# Patient Record
Sex: MALE | Race: WHITE | NOT HISPANIC OR LATINO | Employment: UNEMPLOYED | URBAN - NONMETROPOLITAN AREA
[De-identification: names, ages, dates, MRNs, and addresses within clinical notes are randomized per-mention and may not be internally consistent; named-entity substitution may affect disease eponyms.]

---

## 2017-03-20 ENCOUNTER — HOSPITAL ENCOUNTER (EMERGENCY)
Facility: HOSPITAL | Age: 32
Discharge: STILL ADMITTED OR ADMINISTRATIVE | End: 2017-03-21
Attending: EMERGENCY MEDICINE | Admitting: EMERGENCY MEDICINE

## 2017-03-20 VITALS
HEIGHT: 71 IN | WEIGHT: 148 LBS | TEMPERATURE: 98.1 F | DIASTOLIC BLOOD PRESSURE: 81 MMHG | HEART RATE: 100 BPM | RESPIRATION RATE: 18 BRPM | OXYGEN SATURATION: 99 % | BODY MASS INDEX: 20.72 KG/M2 | SYSTOLIC BLOOD PRESSURE: 126 MMHG

## 2017-03-20 DIAGNOSIS — F29 PSYCHOSIS, UNSPECIFIED PSYCHOSIS TYPE (HCC): Primary | ICD-10-CM

## 2017-03-20 DIAGNOSIS — R45.851 SUICIDAL IDEATIONS: ICD-10-CM

## 2017-03-20 RX ORDER — ALPRAZOLAM 0.25 MG/1
0.25 TABLET ORAL 2 TIMES DAILY PRN
Status: ON HOLD | COMMUNITY
End: 2017-03-21

## 2017-03-21 ENCOUNTER — HOSPITAL ENCOUNTER (INPATIENT)
Facility: HOSPITAL | Age: 32
LOS: 2 days | Discharge: HOME | End: 2017-03-23
Attending: PSYCHIATRY & NEUROLOGY | Admitting: PSYCHIATRY & NEUROLOGY

## 2017-03-21 PROBLEM — R45.851 DEPRESSION WITH SUICIDAL IDEATION: Status: ACTIVE | Noted: 2017-03-21

## 2017-03-21 PROBLEM — F25.0 SCHIZOAFFECTIVE DISORDER, BIPOLAR TYPE (HCC): Status: ACTIVE | Noted: 2017-03-21

## 2017-03-21 PROBLEM — F32.A DEPRESSION WITH SUICIDAL IDEATION: Status: ACTIVE | Noted: 2017-03-21

## 2017-03-21 LAB
ALBUMIN SERPL-MCNC: 4.2 G/DL (ref 3.5–5)
ALBUMIN/GLOB SERPL: 1.4 G/DL (ref 1.5–2.5)
ALP SERPL-CCNC: 70 U/L (ref 40–129)
ALT SERPL W P-5'-P-CCNC: 22 U/L (ref 10–44)
AMPHET+METHAMPHET UR QL: NEGATIVE
ANION GAP SERPL CALCULATED.3IONS-SCNC: 6.4 MMOL/L (ref 3.6–11.2)
AST SERPL-CCNC: 28 U/L (ref 10–34)
BARBITURATES UR QL SCN: NEGATIVE
BASOPHILS # BLD AUTO: 0.02 10*3/MM3 (ref 0–0.3)
BASOPHILS NFR BLD AUTO: 0.2 % (ref 0–2)
BENZODIAZ UR QL SCN: NEGATIVE
BILIRUB SERPL-MCNC: 0.4 MG/DL (ref 0.2–1.8)
BILIRUB UR QL STRIP: NEGATIVE
BUN BLD-MCNC: 11 MG/DL (ref 7–21)
BUN/CREAT SERPL: 14.5 (ref 7–25)
BUPRENORPHINE+NOR UR QL SCN: NEGATIVE
CALCIUM SPEC-SCNC: 9.1 MG/DL (ref 7.7–10)
CANNABINOIDS SERPL QL: POSITIVE
CHLORIDE SERPL-SCNC: 105 MMOL/L (ref 99–112)
CLARITY UR: CLEAR
CO2 SERPL-SCNC: 27.6 MMOL/L (ref 24.3–31.9)
COCAINE UR QL: NEGATIVE
COLOR UR: YELLOW
CREAT BLD-MCNC: 0.76 MG/DL (ref 0.43–1.29)
DEPRECATED RDW RBC AUTO: 41.9 FL (ref 37–54)
EOSINOPHIL # BLD AUTO: 0.06 10*3/MM3 (ref 0–0.7)
EOSINOPHIL NFR BLD AUTO: 0.6 % (ref 0–5)
ERYTHROCYTE [DISTWIDTH] IN BLOOD BY AUTOMATED COUNT: 12.8 % (ref 11.5–14.5)
ETHANOL BLD-MCNC: <10 MG/DL
ETHANOL UR QL: <0.01 %
GFR SERPL CREATININE-BSD FRML MDRD: 120 ML/MIN/1.73
GLOBULIN UR ELPH-MCNC: 3 GM/DL
GLUCOSE BLD-MCNC: 158 MG/DL (ref 70–110)
GLUCOSE UR STRIP-MCNC: NEGATIVE MG/DL
HCT VFR BLD AUTO: 44.8 % (ref 42–52)
HGB BLD-MCNC: 15 G/DL (ref 14–18)
HGB UR QL STRIP.AUTO: NEGATIVE
IMM GRANULOCYTES # BLD: 0.02 10*3/MM3 (ref 0–0.03)
IMM GRANULOCYTES NFR BLD: 0.2 % (ref 0–0.5)
KETONES UR QL STRIP: NEGATIVE
LEUKOCYTE ESTERASE UR QL STRIP.AUTO: NEGATIVE
LYMPHOCYTES # BLD AUTO: 2.02 10*3/MM3 (ref 1–3)
LYMPHOCYTES NFR BLD AUTO: 18.6 % (ref 21–51)
MCH RBC QN AUTO: 30.8 PG (ref 27–33)
MCHC RBC AUTO-ENTMCNC: 33.5 G/DL (ref 33–37)
MCV RBC AUTO: 92 FL (ref 80–94)
METHADONE UR QL SCN: NEGATIVE
MONOCYTES # BLD AUTO: 0.56 10*3/MM3 (ref 0.1–0.9)
MONOCYTES NFR BLD AUTO: 5.2 % (ref 0–10)
NEUTROPHILS # BLD AUTO: 8.19 10*3/MM3 (ref 1.4–6.5)
NEUTROPHILS NFR BLD AUTO: 75.2 % (ref 30–70)
NITRITE UR QL STRIP: NEGATIVE
OPIATES UR QL: NEGATIVE
OSMOLALITY SERPL CALC.SUM OF ELEC: 280.2 MOSM/KG (ref 273–305)
OXYCODONE UR QL SCN: NEGATIVE
PCP UR QL SCN: NEGATIVE
PH UR STRIP.AUTO: 6.5 [PH] (ref 5–8)
PLATELET # BLD AUTO: 223 10*3/MM3 (ref 130–400)
PMV BLD AUTO: 11.2 FL (ref 6–10)
POTASSIUM BLD-SCNC: 3.6 MMOL/L (ref 3.5–5.3)
PROPOXYPH UR QL: NEGATIVE
PROT SERPL-MCNC: 7.2 G/DL (ref 6–8)
PROT UR QL STRIP: NEGATIVE
RBC # BLD AUTO: 4.87 10*6/MM3 (ref 4.7–6.1)
SODIUM BLD-SCNC: 139 MMOL/L (ref 135–153)
SP GR UR STRIP: 1.01 (ref 1–1.03)
UROBILINOGEN UR QL STRIP: NORMAL
WBC NRBC COR # BLD: 10.87 10*3/MM3 (ref 4.5–12.5)

## 2017-03-21 PROCEDURE — 99223 1ST HOSP IP/OBS HIGH 75: CPT

## 2017-03-21 PROCEDURE — 25010000002 ZIPRASIDONE MESYLATE PER 10 MG: Performed by: PSYCHIATRY & NEUROLOGY

## 2017-03-21 RX ORDER — BENZONATATE 100 MG/1
100 CAPSULE ORAL 3 TIMES DAILY PRN
Status: DISCONTINUED | OUTPATIENT
Start: 2017-03-21 | End: 2017-03-23 | Stop reason: HOSPADM

## 2017-03-21 RX ORDER — ACETAMINOPHEN 325 MG/1
650 TABLET ORAL EVERY 4 HOURS PRN
Status: DISCONTINUED | OUTPATIENT
Start: 2017-03-21 | End: 2017-03-23 | Stop reason: HOSPADM

## 2017-03-21 RX ORDER — LORAZEPAM 2 MG/ML
2 INJECTION INTRAMUSCULAR EVERY 6 HOURS PRN
Status: DISCONTINUED | OUTPATIENT
Start: 2017-03-21 | End: 2017-03-23 | Stop reason: HOSPADM

## 2017-03-21 RX ORDER — ALUMINA, MAGNESIA, AND SIMETHICONE 2400; 2400; 240 MG/30ML; MG/30ML; MG/30ML
15 SUSPENSION ORAL EVERY 6 HOURS PRN
Status: DISCONTINUED | OUTPATIENT
Start: 2017-03-21 | End: 2017-03-23 | Stop reason: HOSPADM

## 2017-03-21 RX ORDER — ONDANSETRON 4 MG/1
4 TABLET, FILM COATED ORAL EVERY 6 HOURS PRN
Status: DISCONTINUED | OUTPATIENT
Start: 2017-03-21 | End: 2017-03-23 | Stop reason: HOSPADM

## 2017-03-21 RX ORDER — BENZTROPINE MESYLATE 1 MG/1
1 TABLET ORAL DAILY PRN
Status: DISCONTINUED | OUTPATIENT
Start: 2017-03-21 | End: 2017-03-23 | Stop reason: HOSPADM

## 2017-03-21 RX ORDER — BENZTROPINE MESYLATE 1 MG/ML
0.5 INJECTION INTRAMUSCULAR; INTRAVENOUS DAILY PRN
Status: DISCONTINUED | OUTPATIENT
Start: 2017-03-21 | End: 2017-03-23 | Stop reason: HOSPADM

## 2017-03-21 RX ORDER — WATER 1000 ML/1000ML
INJECTION, SOLUTION INTRAVENOUS
Status: COMPLETED
Start: 2017-03-21 | End: 2017-03-21

## 2017-03-21 RX ORDER — NICOTINE 21 MG/24HR
1 PATCH, TRANSDERMAL 24 HOURS TRANSDERMAL EVERY 24 HOURS
Status: DISCONTINUED | OUTPATIENT
Start: 2017-03-21 | End: 2017-03-23 | Stop reason: HOSPADM

## 2017-03-21 RX ORDER — PALIPERIDONE 6 MG/1
6 TABLET, EXTENDED RELEASE ORAL DAILY
Status: DISCONTINUED | OUTPATIENT
Start: 2017-03-21 | End: 2017-03-23 | Stop reason: HOSPADM

## 2017-03-21 RX ORDER — ECHINACEA PURPUREA EXTRACT 125 MG
2 TABLET ORAL AS NEEDED
Status: DISCONTINUED | OUTPATIENT
Start: 2017-03-21 | End: 2017-03-23 | Stop reason: HOSPADM

## 2017-03-21 RX ORDER — ZIPRASIDONE MESYLATE 20 MG/ML
10 INJECTION, POWDER, LYOPHILIZED, FOR SOLUTION INTRAMUSCULAR EVERY 6 HOURS PRN
Status: DISCONTINUED | OUTPATIENT
Start: 2017-03-21 | End: 2017-03-23 | Stop reason: HOSPADM

## 2017-03-21 RX ORDER — OLANZAPINE 5 MG/1
5 TABLET, ORALLY DISINTEGRATING ORAL EVERY 6 HOURS PRN
Status: DISCONTINUED | OUTPATIENT
Start: 2017-03-21 | End: 2017-03-23 | Stop reason: HOSPADM

## 2017-03-21 RX ORDER — TRAZODONE HYDROCHLORIDE 50 MG/1
50 TABLET ORAL NIGHTLY PRN
Status: DISCONTINUED | OUTPATIENT
Start: 2017-03-21 | End: 2017-03-23 | Stop reason: HOSPADM

## 2017-03-21 RX ORDER — HYDROXYZINE 50 MG/1
50 TABLET, FILM COATED ORAL EVERY 6 HOURS PRN
Status: DISCONTINUED | OUTPATIENT
Start: 2017-03-21 | End: 2017-03-23 | Stop reason: HOSPADM

## 2017-03-21 RX ADMIN — HYDROXYZINE HYDROCHLORIDE 50 MG: 50 TABLET ORAL at 15:22

## 2017-03-21 RX ADMIN — ZIPRASIDONE MESYLATE 10 MG: 20 INJECTION, POWDER, LYOPHILIZED, FOR SOLUTION INTRAMUSCULAR at 03:14

## 2017-03-21 RX ADMIN — PALIPERIDONE 6 MG: 6 TABLET, EXTENDED RELEASE ORAL at 13:25

## 2017-03-21 RX ADMIN — WATER 10 ML: 1 INJECTION INTRAMUSCULAR; INTRAVENOUS; SUBCUTANEOUS at 03:11

## 2017-03-21 NOTE — PLAN OF CARE
Problem: BH Patient Care Overview (Adult)  Goal: Plan of Care Review  Outcome: Ongoing (interventions implemented as appropriate)    03/21/17 1445   Coping/Psychosocial Response Interventions   Plan Of Care Reviewed With patient   Coping/Psychosocial   Patient Agreement with Plan of Care agrees   Patient Care Overview   Progress improving   Outcome Evaluation   Outcome Summary/Follow up Plan Patient calm at present, denies any thoughts to harm self, reports anxiety at 7 and depression at 10/10. Patient reports good sleep of 8 hours last night with no complaints. Patient does reports auditory and visual hallucinations and is currently a/o x2. Patient reports medications seem to be helping. Will continue to monitor.       Goal: Interdisciplinary Rounds/Family Conference  Outcome: Ongoing (interventions implemented as appropriate)  Goal: Individualization and Mutuality  Outcome: Ongoing (interventions implemented as appropriate)  Goal: Discharge Needs Assessment  Outcome: Ongoing (interventions implemented as appropriate)    Problem:  Overarching Goals  Goal: Adheres to Safety Considerations for Self and Others  Outcome: Ongoing (interventions implemented as appropriate)  Goal: Optimized Coping Skills in Response to Life Stressors  Outcome: Ongoing (interventions implemented as appropriate)  Goal: Develops/Participates in Therapeutic Dania to Support Successful Transition  Outcome: Ongoing (interventions implemented as appropriate)

## 2017-03-21 NOTE — NURSING NOTE
Patient to intake per ED staff.  Pockets emptied and through search complete.  Patient searched by intake nurse and witnessed (Kaylee, ) per ED Policy 100.  Belongings logged on Personal Belongings Inventory Sheet. Patient placed in hospital attire.  Room swept for any potential safety hazards, room cleared, and patient placed in treatment room.  Patient ready for evaluation.

## 2017-03-21 NOTE — ED NOTES
Walked pt back to intake area at this time and report given to intake RN. Security present     Meryl Su RN  03/20/17 2792
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
## 2017-03-21 NOTE — NURSING NOTE
Keila, Registration notified of bed assignment (21 B) with new admission ready to be completed at this time.  Verbalizes understanding.

## 2017-03-21 NOTE — NURSING NOTE
PATEL Traylor notified of new orders (Geodon 10 mg IM every 6 hours x 1 day for agitation and Private Room) per Dr. Lopez.  Verbalizes understanding with no questions at this time.

## 2017-03-21 NOTE — NURSING NOTE
Kymberly RN notified at this time of intake assessment, laboratory results, bed assignment (21 B), and new order to admit, routine orders, and SP3 precautions per Dr. Lopez.  Verbalizes understanding with no questions at this time.
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
## 2017-03-21 NOTE — NURSING NOTE
Dr. Lopez notified of intake assessment, laboratory results, and verbalizes understanding with new orders noted at this time (admit, routine orders, SP3 Precautions).  RBTO x2.

## 2017-03-21 NOTE — NURSING NOTE
PATEL Briceño Lead notified of new order per Dr. Lopez to admit, routine orders, SP3 Precautions, and insurance (Medicare A & B).  Verbalizes understanding and bed assignment (21 B).

## 2017-03-21 NOTE — NURSING NOTE
Dr. Lopez notified of inappropriate patient behavior, agitation, and verbalizes understanding with new order (Geodon 10 mg IM every 6 hours PRN x 1 day for agitation and Private Room) noted at this time. RBTO x2.

## 2017-03-21 NOTE — NURSING NOTE
Krysta Pharmacy Tech (ext. [phone]) notified of new order to admit per Dr. Lopez and bed assignment (21 B).  Verbalizes understanding.

## 2017-03-22 PROCEDURE — 99232 SBSQ HOSP IP/OBS MODERATE 35: CPT

## 2017-03-22 RX ADMIN — PALIPERIDONE 6 MG: 6 TABLET, EXTENDED RELEASE ORAL at 08:22

## 2017-03-22 RX ADMIN — OLANZAPINE 5 MG: 5 TABLET, ORALLY DISINTEGRATING ORAL at 08:22

## 2017-03-22 RX ADMIN — NICOTINE 1 PATCH: 21 PATCH TRANSDERMAL at 08:22

## 2017-03-22 RX ADMIN — TRAZODONE HYDROCHLORIDE 50 MG: 50 TABLET ORAL at 20:40

## 2017-03-22 NOTE — PLAN OF CARE
Problem: BH Patient Care Overview (Adult)  Goal: Plan of Care Review  Outcome: Ongoing (interventions implemented as appropriate)  NO PROBLEMS THIS SHIFT. INVEGA 6MG Q DAY, PHARMACY CONSULT.    03/22/17 1520   Coping/Psychosocial Response Interventions   Plan Of Care Reviewed With patient   Coping/Psychosocial   Patient Agreement with Plan of Care agrees   Patient Care Overview   Progress no change       Goal: Interdisciplinary Rounds/Family Conference  Outcome: Ongoing (interventions implemented as appropriate)  Goal: Individualization and Mutuality  Outcome: Ongoing (interventions implemented as appropriate)  Goal: Discharge Needs Assessment  Outcome: Ongoing (interventions implemented as appropriate)    Problem: BH Overarching Goals  Goal: Adheres to Safety Considerations for Self and Others  Outcome: Ongoing (interventions implemented as appropriate)  Goal: Optimized Coping Skills in Response to Life Stressors  Outcome: Ongoing (interventions implemented as appropriate)  Goal: Develops/Participates in Therapeutic [place] to Support Successful Transition  Outcome: Ongoing (interventions implemented as appropriate)

## 2017-03-22 NOTE — PROGRESS NOTES
D: The therapist was informed the patient had an $8.00 copay on his Invega injection, which was to be given tomorrow. The therapist spoke with the patient at his bedside, assessing his needs and discussing his ability to meet the copayment. The patient reported he did not have any money. He consented for the therapist to contact his father, Aric, and to discuss the possibility of his meeting the copayment on his behalf. However, the patient felt almost certain his father would not be able to meet the copayment.     A: The patient seemed calm and cooperative at this time. He seemed oriented, and his thought processes seemed organized.     The therapist attempted to contact the patient's father, Aric, at telephone number [phone].  He was not reachable at this time, and a message was left requesting callback explaining the purpose of the phone call.    P: The patient will possibly be discharged tomorrow. The therapist will assist the patient with meeting his copayment through the [facility] program, if his father is not able to assist. His father will also provide transportation tomorrow.

## 2017-03-22 NOTE — PLAN OF CARE
"Problem:  Patient Care Overview (Adult)  Goal: Plan of Care Review  Outcome: Ongoing (interventions implemented as appropriate)    03/22/17 0455   Coping/Psychosocial Response Interventions   Plan Of Care Reviewed With patient   Coping/Psychosocial   Patient Agreement with Plan of Care agrees   Patient Care Overview   Progress no change   Outcome Evaluation   Outcome Summary/Follow up Plan PT RATED ANXIETY & DEPRESSION BOTH 10, DENIED SI/HI, REPORTED A/V HALLUCINATIONS \"LOT OF STUFF\". NO OUT BURST THIS SHIFT, IN ROOM MOST OF SHIFT.         Problem:  Overarching Goals  Goal: Adheres to Safety Considerations for Self and Others  Outcome: Ongoing (interventions implemented as appropriate)  Goal: Optimized Coping Skills in Response to Life Stressors  Outcome: Ongoing (interventions implemented as appropriate)  Goal: Develops/Participates in Therapeutic [place] to Support Successful Transition  Outcome: Ongoing (interventions implemented as appropriate)      "

## 2017-03-23 VITALS
DIASTOLIC BLOOD PRESSURE: 84 MMHG | BODY MASS INDEX: 21.7 KG/M2 | TEMPERATURE: 97.8 F | SYSTOLIC BLOOD PRESSURE: 124 MMHG | OXYGEN SATURATION: 98 % | WEIGHT: 155 LBS | RESPIRATION RATE: 18 BRPM | HEIGHT: 71 IN | HEART RATE: 110 BPM

## 2017-03-23 PROCEDURE — 99238 HOSP IP/OBS DSCHRG MGMT 30/<: CPT

## 2017-03-23 RX ORDER — TRAZODONE HYDROCHLORIDE 50 MG/1
50 TABLET ORAL NIGHTLY PRN
Qty: 30 TABLET | Refills: 0 | Status: SHIPPED | OUTPATIENT
Start: 2017-03-23 | End: 2017-04-28 | Stop reason: SDUPTHER

## 2017-03-23 RX ADMIN — PALIPERIDONE 6 MG: 6 TABLET, EXTENDED RELEASE ORAL at 08:14

## 2017-03-23 RX ADMIN — NICOTINE 1 PATCH: 21 PATCH TRANSDERMAL at 08:14

## 2017-03-23 NOTE — PLAN OF CARE
Problem: BH Patient Care Overview (Adult)  Goal: Plan of Care Review  Outcome: Ongoing (interventions implemented as appropriate)    03/22/17 2019   Coping/Psychosocial Response Interventions   Plan Of Care Reviewed With patient   Coping/Psychosocial   Patient Agreement with Plan of Care agrees   Patient Care Overview   Progress no change   Outcome Evaluation   Outcome Summary/Follow up Plan Pt calm and cooperative. Pt denies any anxiety, depression, SI, HI, and hallucinations.          Problem:  Overarching Goals  Goal: Adheres to Safety Considerations for Self and Others  Outcome: Ongoing (interventions implemented as appropriate)  Goal: Optimized Coping Skills in Response to Life Stressors  Outcome: Ongoing (interventions implemented as appropriate)  Goal: Develops/Participates in Therapeutic [place] to Support Successful Transition  Outcome: Ongoing (interventions implemented as appropriate)
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
## 2017-03-23 NOTE — PROGRESS NOTES
Contacted the patient's father, Aric, who was reachable at [phone]. Informed Aric of the likelihood the patient would be discharged today. Informed Aric of the Invega injection ordered today and the $8 copayment. Aric agreed to pay the $8 for the Invega injection today. He agreed to provide transportation. He was also agreeable to the Invega injection scheduled a month from today. He was aware there may be another copayment at that time.

## 2017-03-23 NOTE — PLAN OF CARE
Problem:  Patient Care Overview (Adult)  Goal: Plan of Care Review  Outcome: Outcome(s) achieved Date Met:  03/23/17 03/23/17 1105   Coping/Psychosocial Response Interventions   Plan Of Care Reviewed With patient   Coping/Psychosocial   Patient Agreement with Plan of Care agrees   Patient Care Overview   Progress improving   Outcome Evaluation   Outcome Summary/Follow up Plan Ready for discharge.       Goal: Interdisciplinary Rounds/Family Conference  Outcome: Outcome(s) achieved Date Met:  03/23/17  Goal: Individualization and Mutuality  Outcome: Outcome(s) achieved Date Met:  03/23/17  Goal: Discharge Needs Assessment  Outcome: Outcome(s) achieved Date Met:  03/23/17    Problem:  Overarching Goals  Goal: Adheres to Safety Considerations for Self and Others  Outcome: Outcome(s) achieved Date Met:  03/23/17  Goal: Optimized Coping Skills in Response to Life Stressors  Outcome: Outcome(s) achieved Date Met:  03/23/17  Goal: Develops/Participates in Therapeutic [place] to Support Successful Transition  Outcome: Outcome(s) achieved Date Met:  03/23/17    Problem: Alteration in Thoughts and Perception  Goal: Treatment Goal: Gain control of psychotic behaviors/thinking, reduce/eliminate presenting symptoms and demonstrate improved reality functioning upon discharge  Outcome: Outcome(s) achieved Date Met:  03/23/17  Goal: Verbalize thoughts and feelings associated with:  Outcome: Outcome(s) achieved Date Met:  03/23/17    Problem: Anxiety (Adult)  Goal: Identify Related Risk Factors and Signs and Symptoms  Outcome: Outcome(s) achieved Date Met:  03/23/17    Problem: Depression  Goal: Treatment Goal: Demonstrate behavioral control of depressive symptoms, verbalize feelings of improved mood/affect, and adopt new coping skills prior to discharge  Outcome: Outcome(s) achieved Date Met:  03/23/17  Goal: Verbalize thoughts and feelings associated with:  Outcome: Outcome(s) achieved Date Met:  03/23/17    Problem: Risk  for Self Injury/Neglect  Goal: Verbalize thoughts and feelings associated with:  Outcome: Outcome(s) achieved Date Met:  03/23/17

## 2017-03-23 NOTE — PROGRESS NOTES
Therapist met with patient in day area to discuss disposition planning and progress with treatment.  Patient denied concerns.  He reported feeling less depressed.  Patient denied thoughts to harm self or others.  He denied hallucinations.  Patient appeared oriented x4, displayed linear thought process.  He appeared calm and cooperative.  Patient appeared stable to discharge today.  He was agreeable to continue Invega injections in the [facility].  Patient agreeable and has aftercare scheduled with [facility] on March 30th, 2017 at 9:15 AM.  Patient educated and agreed to seek nearest ER if his symptoms become acute again.

## 2017-03-23 NOTE — SIGNIFICANT NOTE
03/23/17 1033   [place] Suicide Severity Rating Scale (Discharge)   1. Wish to be Dead No   2. Suicidal Thoughts No   6. Suicide Behavior Question No

## 2017-05-05 ENCOUNTER — HOSPITAL ENCOUNTER (INPATIENT)
Facility: HOSPITAL | Age: 32
LOS: 4 days | Discharge: HOME | End: 2017-05-09
Attending: PSYCHIATRY & NEUROLOGY | Admitting: PSYCHIATRY & NEUROLOGY

## 2017-05-05 ENCOUNTER — HOSPITAL ENCOUNTER (EMERGENCY)
Facility: HOSPITAL | Age: 32
Discharge: STILL ADMITTED OR ADMINISTRATIVE | End: 2017-05-05
Attending: EMERGENCY MEDICINE | Admitting: EMERGENCY MEDICINE

## 2017-05-05 VITALS
BODY MASS INDEX: 22.73 KG/M2 | HEART RATE: 107 BPM | HEIGHT: 68 IN | WEIGHT: 150 LBS | TEMPERATURE: 98.4 F | DIASTOLIC BLOOD PRESSURE: 77 MMHG | OXYGEN SATURATION: 96 % | RESPIRATION RATE: 18 BRPM | SYSTOLIC BLOOD PRESSURE: 126 MMHG

## 2017-05-05 DIAGNOSIS — F25.0 SCHIZOAFFECTIVE DISORDER, BIPOLAR TYPE (HCC): Primary | ICD-10-CM

## 2017-05-05 PROBLEM — F32.9 MDD (MAJOR DEPRESSIVE DISORDER): Status: ACTIVE | Noted: 2017-05-05

## 2017-05-05 LAB
6-ACETYL MORPHINE: NEGATIVE
ALBUMIN SERPL-MCNC: 4.6 G/DL (ref 3.5–5)
ALBUMIN/GLOB SERPL: 1.6 G/DL (ref 1.5–2.5)
ALP SERPL-CCNC: 66 U/L (ref 40–129)
ALT SERPL W P-5'-P-CCNC: 12 U/L (ref 10–44)
AMPHET+METHAMPHET UR QL: POSITIVE
ANION GAP SERPL CALCULATED.3IONS-SCNC: 4.2 MMOL/L (ref 3.6–11.2)
AST SERPL-CCNC: 19 U/L (ref 10–34)
BARBITURATES UR QL SCN: NEGATIVE
BASOPHILS # BLD AUTO: 0.02 10*3/MM3 (ref 0–0.3)
BASOPHILS NFR BLD AUTO: 0.2 % (ref 0–2)
BENZODIAZ UR QL SCN: NEGATIVE
BILIRUB SERPL-MCNC: 0.6 MG/DL (ref 0.2–1.8)
BILIRUB UR QL STRIP: NEGATIVE
BUN BLD-MCNC: 9 MG/DL (ref 7–21)
BUN/CREAT SERPL: 12.2 (ref 7–25)
BUPRENORPHINE SERPL-MCNC: NEGATIVE NG/ML
CALCIUM SPEC-SCNC: 9.6 MG/DL (ref 7.7–10)
CANNABINOIDS SERPL QL: POSITIVE
CHLORIDE SERPL-SCNC: 106 MMOL/L (ref 99–112)
CLARITY UR: CLEAR
CO2 SERPL-SCNC: 31.8 MMOL/L (ref 24.3–31.9)
COCAINE UR QL: NEGATIVE
COLOR UR: ABNORMAL
CREAT BLD-MCNC: 0.74 MG/DL (ref 0.43–1.29)
DEPRECATED RDW RBC AUTO: 42.3 FL (ref 37–54)
EOSINOPHIL # BLD AUTO: 0.08 10*3/MM3 (ref 0–0.7)
EOSINOPHIL NFR BLD AUTO: 0.8 % (ref 0–5)
ERYTHROCYTE [DISTWIDTH] IN BLOOD BY AUTOMATED COUNT: 12.9 % (ref 11.5–14.5)
ETHANOL BLD-MCNC: <10 MG/DL
ETHANOL UR QL: <0.01 %
GFR SERPL CREATININE-BSD FRML MDRD: 124 ML/MIN/1.73
GLOBULIN UR ELPH-MCNC: 2.8 GM/DL
GLUCOSE BLD-MCNC: 84 MG/DL (ref 70–110)
GLUCOSE UR STRIP-MCNC: NEGATIVE MG/DL
HCT VFR BLD AUTO: 44.9 % (ref 42–52)
HGB BLD-MCNC: 15.2 G/DL (ref 14–18)
HGB UR QL STRIP.AUTO: NEGATIVE
IMM GRANULOCYTES # BLD: 0.03 10*3/MM3 (ref 0–0.03)
IMM GRANULOCYTES NFR BLD: 0.3 % (ref 0–0.5)
KETONES UR QL STRIP: ABNORMAL
LEUKOCYTE ESTERASE UR QL STRIP.AUTO: NEGATIVE
LYMPHOCYTES # BLD AUTO: 1.62 10*3/MM3 (ref 1–3)
LYMPHOCYTES NFR BLD AUTO: 16.9 % (ref 21–51)
MCH RBC QN AUTO: 30.6 PG (ref 27–33)
MCHC RBC AUTO-ENTMCNC: 33.9 G/DL (ref 33–37)
MCV RBC AUTO: 90.3 FL (ref 80–94)
MDMA UR QL SCN: NEGATIVE
METHADONE UR QL SCN: NEGATIVE
MONOCYTES # BLD AUTO: 0.71 10*3/MM3 (ref 0.1–0.9)
MONOCYTES NFR BLD AUTO: 7.4 % (ref 0–10)
NEUTROPHILS # BLD AUTO: 7.1 10*3/MM3 (ref 1.4–6.5)
NEUTROPHILS NFR BLD AUTO: 74.4 % (ref 30–70)
NITRITE UR QL STRIP: NEGATIVE
OPIATES UR QL: NEGATIVE
OSMOLALITY SERPL CALC.SUM OF ELEC: 281 MOSM/KG (ref 273–305)
OXYCODONE UR QL SCN: NEGATIVE
PCP UR QL SCN: NEGATIVE
PH UR STRIP.AUTO: 6 [PH] (ref 5–8)
PLATELET # BLD AUTO: 190 10*3/MM3 (ref 130–400)
PMV BLD AUTO: 11.5 FL (ref 6–10)
POTASSIUM BLD-SCNC: 3.9 MMOL/L (ref 3.5–5.3)
PROT SERPL-MCNC: 7.4 G/DL (ref 6–8)
PROT UR QL STRIP: NEGATIVE
RBC # BLD AUTO: 4.97 10*6/MM3 (ref 4.7–6.1)
SODIUM BLD-SCNC: 142 MMOL/L (ref 135–153)
SP GR UR STRIP: 1.02 (ref 1–1.03)
UROBILINOGEN UR QL STRIP: ABNORMAL
WBC NRBC COR # BLD: 9.56 10*3/MM3 (ref 4.5–12.5)

## 2017-05-05 RX ORDER — UREA 10 %
2 LOTION (ML) TOPICAL DAILY
Status: DISCONTINUED | OUTPATIENT
Start: 2017-05-05 | End: 2017-05-09 | Stop reason: HOSPADM

## 2017-05-05 RX ORDER — ALUMINA, MAGNESIA, AND SIMETHICONE 2400; 2400; 240 MG/30ML; MG/30ML; MG/30ML
15 SUSPENSION ORAL EVERY 6 HOURS PRN
Status: DISCONTINUED | OUTPATIENT
Start: 2017-05-05 | End: 2017-05-09 | Stop reason: HOSPADM

## 2017-05-05 RX ORDER — ONDANSETRON 4 MG/1
4 TABLET, FILM COATED ORAL EVERY 6 HOURS PRN
Status: DISCONTINUED | OUTPATIENT
Start: 2017-05-05 | End: 2017-05-09 | Stop reason: HOSPADM

## 2017-05-05 RX ORDER — LORAZEPAM 2 MG/ML
2 INJECTION INTRAMUSCULAR EVERY 6 HOURS PRN
Status: DISCONTINUED | OUTPATIENT
Start: 2017-05-05 | End: 2017-05-09 | Stop reason: HOSPADM

## 2017-05-05 RX ORDER — FAMOTIDINE 20 MG/1
20 TABLET, FILM COATED ORAL 2 TIMES DAILY PRN
Status: DISCONTINUED | OUTPATIENT
Start: 2017-05-05 | End: 2017-05-09 | Stop reason: HOSPADM

## 2017-05-05 RX ORDER — BENZTROPINE MESYLATE 1 MG/ML
0.5 INJECTION INTRAMUSCULAR; INTRAVENOUS DAILY PRN
Status: DISCONTINUED | OUTPATIENT
Start: 2017-05-05 | End: 2017-05-09 | Stop reason: HOSPADM

## 2017-05-05 RX ORDER — MULTIVITAMIN
1 TABLET ORAL DAILY
Status: DISCONTINUED | OUTPATIENT
Start: 2017-05-05 | End: 2017-05-09 | Stop reason: HOSPADM

## 2017-05-05 RX ORDER — ECHINACEA PURPUREA EXTRACT 125 MG
2 TABLET ORAL AS NEEDED
Status: DISCONTINUED | OUTPATIENT
Start: 2017-05-05 | End: 2017-05-09 | Stop reason: HOSPADM

## 2017-05-05 RX ORDER — NICOTINE 21 MG/24HR
1 PATCH, TRANSDERMAL 24 HOURS TRANSDERMAL DAILY
Status: DISCONTINUED | OUTPATIENT
Start: 2017-05-05 | End: 2017-05-09 | Stop reason: HOSPADM

## 2017-05-05 RX ORDER — BENZTROPINE MESYLATE 1 MG/1
1 TABLET ORAL DAILY PRN
Status: DISCONTINUED | OUTPATIENT
Start: 2017-05-05 | End: 2017-05-09 | Stop reason: HOSPADM

## 2017-05-05 RX ORDER — HYDROXYZINE 50 MG/1
50 TABLET, FILM COATED ORAL EVERY 4 HOURS PRN
Status: DISCONTINUED | OUTPATIENT
Start: 2017-05-05 | End: 2017-05-09 | Stop reason: HOSPADM

## 2017-05-05 RX ORDER — DIPHENHYDRAMINE HYDROCHLORIDE 50 MG/ML
50 INJECTION INTRAMUSCULAR; INTRAVENOUS EVERY 6 HOURS PRN
Status: DISCONTINUED | OUTPATIENT
Start: 2017-05-05 | End: 2017-05-09 | Stop reason: HOSPADM

## 2017-05-05 RX ORDER — HALOPERIDOL 5 MG/ML
5 INJECTION INTRAMUSCULAR EVERY 6 HOURS PRN
Status: DISCONTINUED | OUTPATIENT
Start: 2017-05-05 | End: 2017-05-09 | Stop reason: HOSPADM

## 2017-05-05 RX ORDER — ACETAMINOPHEN 325 MG/1
650 TABLET ORAL EVERY 4 HOURS PRN
Status: DISCONTINUED | OUTPATIENT
Start: 2017-05-05 | End: 2017-05-09 | Stop reason: HOSPADM

## 2017-05-05 RX ORDER — TRAZODONE HYDROCHLORIDE 50 MG/1
50 TABLET ORAL NIGHTLY PRN
Status: DISCONTINUED | OUTPATIENT
Start: 2017-05-05 | End: 2017-05-09 | Stop reason: HOSPADM

## 2017-05-05 RX ORDER — LOPERAMIDE HYDROCHLORIDE 2 MG/1
2 CAPSULE ORAL 4 TIMES DAILY PRN
Status: DISCONTINUED | OUTPATIENT
Start: 2017-05-05 | End: 2017-05-09 | Stop reason: HOSPADM

## 2017-05-05 RX ORDER — BENZONATATE 100 MG/1
100 CAPSULE ORAL 3 TIMES DAILY PRN
Status: DISCONTINUED | OUTPATIENT
Start: 2017-05-05 | End: 2017-05-09 | Stop reason: HOSPADM

## 2017-05-05 RX ORDER — THIAMINE HCL 50 MG
100 TABLET ORAL DAILY
Status: DISCONTINUED | OUTPATIENT
Start: 2017-05-05 | End: 2017-05-09 | Stop reason: HOSPADM

## 2017-05-05 RX ADMIN — Medication 1 TABLET: at 17:05

## 2017-05-05 RX ADMIN — THIAMINE HCL (VITAMIN B1) 50 MG TABLET 100 MG: at 17:05

## 2017-05-05 RX ADMIN — HYDROXYZINE HYDROCHLORIDE 50 MG: 50 TABLET ORAL at 17:05

## 2017-05-05 RX ADMIN — NICOTINE 1 PATCH: 21 PATCH TRANSDERMAL at 17:04

## 2017-05-05 RX ADMIN — Medication 2 TABLET: at 17:05

## 2017-05-06 PROCEDURE — 99221 1ST HOSP IP/OBS SF/LOW 40: CPT | Performed by: PSYCHIATRY & NEUROLOGY

## 2017-05-06 PROCEDURE — 25010000002 DIPHENHYDRAMINE PER 50 MG: Performed by: PSYCHIATRY & NEUROLOGY

## 2017-05-06 PROCEDURE — 25010000002 LORAZEPAM PER 2 MG: Performed by: PSYCHIATRY & NEUROLOGY

## 2017-05-06 PROCEDURE — 25010000002 HALOPERIDOL LACTATE PER 5 MG: Performed by: PSYCHIATRY & NEUROLOGY

## 2017-05-06 RX ADMIN — THIAMINE HCL (VITAMIN B1) 50 MG TABLET 100 MG: at 08:27

## 2017-05-06 RX ADMIN — Medication 1 TABLET: at 08:27

## 2017-05-06 RX ADMIN — DIPHENHYDRAMINE HYDROCHLORIDE 50 MG: 50 INJECTION INTRAMUSCULAR; INTRAVENOUS at 12:43

## 2017-05-06 RX ADMIN — NICOTINE 1 PATCH: 21 PATCH TRANSDERMAL at 08:28

## 2017-05-06 RX ADMIN — LORAZEPAM 2 MG: 2 INJECTION INTRAMUSCULAR; INTRAVENOUS at 12:43

## 2017-05-06 RX ADMIN — Medication 2 TABLET: at 08:27

## 2017-05-06 RX ADMIN — HALOPERIDOL LACTATE 5 MG: 5 INJECTION, SOLUTION INTRAMUSCULAR at 12:43

## 2017-05-07 PROCEDURE — 25010000002 HALOPERIDOL LACTATE PER 5 MG: Performed by: PSYCHIATRY & NEUROLOGY

## 2017-05-07 PROCEDURE — 99231 SBSQ HOSP IP/OBS SF/LOW 25: CPT | Performed by: PSYCHIATRY & NEUROLOGY

## 2017-05-07 PROCEDURE — 25010000002 DIPHENHYDRAMINE PER 50 MG: Performed by: PSYCHIATRY & NEUROLOGY

## 2017-05-07 PROCEDURE — 25010000002 LORAZEPAM PER 2 MG: Performed by: PSYCHIATRY & NEUROLOGY

## 2017-05-07 RX ADMIN — HALOPERIDOL LACTATE 5 MG: 5 INJECTION, SOLUTION INTRAMUSCULAR at 13:07

## 2017-05-07 RX ADMIN — Medication 2 TABLET: at 08:33

## 2017-05-07 RX ADMIN — LORAZEPAM 2 MG: 2 INJECTION INTRAMUSCULAR; INTRAVENOUS at 13:06

## 2017-05-07 RX ADMIN — NICOTINE 1 PATCH: 21 PATCH TRANSDERMAL at 08:34

## 2017-05-07 RX ADMIN — THIAMINE HCL (VITAMIN B1) 50 MG TABLET 100 MG: at 08:34

## 2017-05-07 RX ADMIN — DIPHENHYDRAMINE HYDROCHLORIDE 50 MG: 50 INJECTION INTRAMUSCULAR; INTRAVENOUS at 13:06

## 2017-05-07 RX ADMIN — Medication 1 TABLET: at 08:33

## 2017-05-08 PROBLEM — F15.10 METHAMPHETAMINE ABUSE (HCC): Status: ACTIVE | Noted: 2017-05-08

## 2017-05-08 PROBLEM — F32.9 MDD (MAJOR DEPRESSIVE DISORDER): Status: RESOLVED | Noted: 2017-05-05 | Resolved: 2017-05-08

## 2017-05-08 PROCEDURE — 25010000002 LORAZEPAM PER 2 MG: Performed by: PSYCHIATRY & NEUROLOGY

## 2017-05-08 PROCEDURE — 25010000002 HALOPERIDOL LACTATE PER 5 MG: Performed by: PSYCHIATRY & NEUROLOGY

## 2017-05-08 PROCEDURE — 99232 SBSQ HOSP IP/OBS MODERATE 35: CPT

## 2017-05-08 PROCEDURE — 25010000002 DIPHENHYDRAMINE PER 50 MG: Performed by: PSYCHIATRY & NEUROLOGY

## 2017-05-08 RX ORDER — OLANZAPINE 5 MG/1
5 TABLET, ORALLY DISINTEGRATING ORAL EVERY 6 HOURS PRN
Status: DISCONTINUED | OUTPATIENT
Start: 2017-05-08 | End: 2017-05-09 | Stop reason: HOSPADM

## 2017-05-08 RX ORDER — CITALOPRAM 20 MG/1
20 TABLET ORAL DAILY
Status: DISCONTINUED | OUTPATIENT
Start: 2017-05-08 | End: 2017-05-09 | Stop reason: HOSPADM

## 2017-05-08 RX ADMIN — DIPHENHYDRAMINE HYDROCHLORIDE 50 MG: 50 INJECTION INTRAMUSCULAR; INTRAVENOUS at 23:36

## 2017-05-08 RX ADMIN — Medication 2 TABLET: at 08:32

## 2017-05-08 RX ADMIN — CITALOPRAM HYDROBROMIDE 20 MG: 20 TABLET ORAL at 15:02

## 2017-05-08 RX ADMIN — LORAZEPAM 2 MG: 2 INJECTION INTRAMUSCULAR; INTRAVENOUS at 23:37

## 2017-05-08 RX ADMIN — NICOTINE 1 PATCH: 21 PATCH TRANSDERMAL at 08:32

## 2017-05-08 RX ADMIN — THIAMINE HCL (VITAMIN B1) 50 MG TABLET 100 MG: at 08:32

## 2017-05-08 RX ADMIN — Medication 1 TABLET: at 08:32

## 2017-05-08 RX ADMIN — HALOPERIDOL LACTATE 5 MG: 5 INJECTION, SOLUTION INTRAMUSCULAR at 23:36

## 2017-05-09 VITALS
WEIGHT: 153.8 LBS | BODY MASS INDEX: 23.31 KG/M2 | OXYGEN SATURATION: 97 % | SYSTOLIC BLOOD PRESSURE: 131 MMHG | HEIGHT: 68 IN | DIASTOLIC BLOOD PRESSURE: 87 MMHG | TEMPERATURE: 97 F | RESPIRATION RATE: 18 BRPM | HEART RATE: 100 BPM

## 2017-05-09 PROCEDURE — 99238 HOSP IP/OBS DSCHRG MGMT 30/<: CPT

## 2017-05-09 RX ORDER — CITALOPRAM 20 MG/1
20 TABLET ORAL DAILY
Qty: 30 TABLET | Refills: 0 | Status: ON HOLD | OUTPATIENT
Start: 2017-05-09 | End: 2017-07-07

## 2017-05-09 RX ORDER — HYDROXYZINE 50 MG/1
50 TABLET, FILM COATED ORAL EVERY 4 HOURS PRN
Qty: 60 TABLET | Refills: 0 | Status: ON HOLD | OUTPATIENT
Start: 2017-05-09 | End: 2017-07-07

## 2017-05-09 RX ORDER — TRAZODONE HYDROCHLORIDE 50 MG/1
50 TABLET ORAL NIGHTLY PRN
Qty: 30 TABLET | Refills: 0 | Status: ON HOLD | OUTPATIENT
Start: 2017-05-09 | End: 2017-07-07

## 2017-05-09 RX ADMIN — THIAMINE HCL (VITAMIN B1) 50 MG TABLET 100 MG: at 08:21

## 2017-05-09 RX ADMIN — NICOTINE 1 PATCH: 21 PATCH TRANSDERMAL at 08:25

## 2017-05-09 RX ADMIN — Medication 2 TABLET: at 08:21

## 2017-05-09 RX ADMIN — CITALOPRAM HYDROBROMIDE 20 MG: 20 TABLET ORAL at 08:21

## 2017-05-09 RX ADMIN — Medication 1 TABLET: at 08:21

## 2017-07-06 ENCOUNTER — HOSPITAL ENCOUNTER (INPATIENT)
Facility: HOSPITAL | Age: 32
LOS: 5 days | Discharge: HOME | End: 2017-07-11
Attending: PSYCHIATRY & NEUROLOGY | Admitting: PSYCHIATRY & NEUROLOGY

## 2017-07-06 ENCOUNTER — HOSPITAL ENCOUNTER (EMERGENCY)
Facility: HOSPITAL | Age: 32
Discharge: HOME | End: 2017-07-06
Attending: EMERGENCY MEDICINE | Admitting: EMERGENCY MEDICINE

## 2017-07-06 VITALS
WEIGHT: 150 LBS | TEMPERATURE: 97.6 F | DIASTOLIC BLOOD PRESSURE: 76 MMHG | BODY MASS INDEX: 22.73 KG/M2 | OXYGEN SATURATION: 100 % | RESPIRATION RATE: 18 BRPM | SYSTOLIC BLOOD PRESSURE: 114 MMHG | HEIGHT: 68 IN | HEART RATE: 90 BPM

## 2017-07-06 DIAGNOSIS — F20.9 ACUTE EXACERBATION OF CHRONIC SCHIZOPHRENIA (HCC): Primary | ICD-10-CM

## 2017-07-06 DIAGNOSIS — F19.10 SUBSTANCE ABUSE (HCC): ICD-10-CM

## 2017-07-06 DIAGNOSIS — R45.851 SUICIDAL IDEATIONS: ICD-10-CM

## 2017-07-06 LAB
6-ACETYL MORPHINE: NEGATIVE
ALBUMIN SERPL-MCNC: 4.5 G/DL (ref 3.5–5)
ALBUMIN/GLOB SERPL: 1.5 G/DL (ref 1.5–2.5)
ALP SERPL-CCNC: 65 U/L (ref 40–129)
ALT SERPL W P-5'-P-CCNC: 23 U/L (ref 10–44)
AMPHET+METHAMPHET UR QL: POSITIVE
ANION GAP SERPL CALCULATED.3IONS-SCNC: 5.2 MMOL/L (ref 3.6–11.2)
AST SERPL-CCNC: 32 U/L (ref 10–34)
BARBITURATES UR QL SCN: NEGATIVE
BASOPHILS # BLD AUTO: 0.02 10*3/MM3 (ref 0–0.3)
BASOPHILS NFR BLD AUTO: 0.3 % (ref 0–2)
BENZODIAZ UR QL SCN: NEGATIVE
BILIRUB SERPL-MCNC: 0.3 MG/DL (ref 0.2–1.8)
BILIRUB UR QL STRIP: ABNORMAL
BUN BLD-MCNC: 8 MG/DL (ref 7–21)
BUN/CREAT SERPL: 10.3 (ref 7–25)
BUPRENORPHINE SERPL-MCNC: NEGATIVE NG/ML
CALCIUM SPEC-SCNC: 9.8 MG/DL (ref 7.7–10)
CANNABINOIDS SERPL QL: POSITIVE
CHLORIDE SERPL-SCNC: 110 MMOL/L (ref 99–112)
CLARITY UR: CLEAR
CO2 SERPL-SCNC: 27.8 MMOL/L (ref 24.3–31.9)
COCAINE UR QL: NEGATIVE
COLOR UR: ABNORMAL
CREAT BLD-MCNC: 0.78 MG/DL (ref 0.43–1.29)
DEPRECATED RDW RBC AUTO: 40.8 FL (ref 37–54)
EOSINOPHIL # BLD AUTO: 0.06 10*3/MM3 (ref 0–0.7)
EOSINOPHIL NFR BLD AUTO: 0.8 % (ref 0–5)
ERYTHROCYTE [DISTWIDTH] IN BLOOD BY AUTOMATED COUNT: 12.9 % (ref 11.5–14.5)
ETHANOL BLD-MCNC: <10 MG/DL
ETHANOL UR QL: <0.01 %
GFR SERPL CREATININE-BSD FRML MDRD: 117 ML/MIN/1.73
GLOBULIN UR ELPH-MCNC: 3 GM/DL
GLUCOSE BLD-MCNC: 100 MG/DL (ref 70–110)
GLUCOSE UR STRIP-MCNC: NEGATIVE MG/DL
HCT VFR BLD AUTO: 41.8 % (ref 42–52)
HGB BLD-MCNC: 14.3 G/DL (ref 14–18)
HGB UR QL STRIP.AUTO: NEGATIVE
IMM GRANULOCYTES # BLD: 0 10*3/MM3 (ref 0–0.03)
IMM GRANULOCYTES NFR BLD: 0 % (ref 0–0.5)
KETONES UR QL STRIP: ABNORMAL
LEUKOCYTE ESTERASE UR QL STRIP.AUTO: NEGATIVE
LYMPHOCYTES # BLD AUTO: 2.95 10*3/MM3 (ref 1–3)
LYMPHOCYTES NFR BLD AUTO: 39.7 % (ref 21–51)
MCH RBC QN AUTO: 30.1 PG (ref 27–33)
MCHC RBC AUTO-ENTMCNC: 34.2 G/DL (ref 33–37)
MCV RBC AUTO: 88 FL (ref 80–94)
MDMA UR QL SCN: POSITIVE
METHADONE UR QL SCN: NEGATIVE
MONOCYTES # BLD AUTO: 0.71 10*3/MM3 (ref 0.1–0.9)
MONOCYTES NFR BLD AUTO: 9.6 % (ref 0–10)
NEUTROPHILS # BLD AUTO: 3.69 10*3/MM3 (ref 1.4–6.5)
NEUTROPHILS NFR BLD AUTO: 49.6 % (ref 30–70)
NITRITE UR QL STRIP: NEGATIVE
OPIATES UR QL: NEGATIVE
OSMOLALITY SERPL CALC.SUM OF ELEC: 283.4 MOSM/KG (ref 273–305)
OXYCODONE UR QL SCN: NEGATIVE
PCP UR QL SCN: NEGATIVE
PH UR STRIP.AUTO: 5.5 [PH] (ref 5–8)
PLATELET # BLD AUTO: 181 10*3/MM3 (ref 130–400)
PMV BLD AUTO: 11.4 FL (ref 6–10)
POTASSIUM BLD-SCNC: 3.5 MMOL/L (ref 3.5–5.3)
PROT SERPL-MCNC: 7.5 G/DL (ref 6–8)
PROT UR QL STRIP: NEGATIVE
RBC # BLD AUTO: 4.75 10*6/MM3 (ref 4.7–6.1)
SODIUM BLD-SCNC: 143 MMOL/L (ref 135–153)
SP GR UR STRIP: >1.03 (ref 1–1.03)
UROBILINOGEN UR QL STRIP: ABNORMAL
WBC NRBC COR # BLD: 7.43 10*3/MM3 (ref 4.5–12.5)

## 2017-07-06 PROCEDURE — HZ2ZZZZ DETOXIFICATION SERVICES FOR SUBSTANCE ABUSE TREATMENT: ICD-10-PCS | Performed by: PSYCHIATRY & NEUROLOGY

## 2017-07-06 RX ORDER — WATER 1000 ML/1000ML
INJECTION, SOLUTION INTRAVENOUS
Status: DISCONTINUED
Start: 2017-07-06 | End: 2017-07-06 | Stop reason: HOSPADM

## 2017-07-06 RX ORDER — ZIPRASIDONE MESYLATE 20 MG/ML
20 INJECTION, POWDER, LYOPHILIZED, FOR SOLUTION INTRAMUSCULAR ONCE
Status: COMPLETED | OUTPATIENT
Start: 2017-07-06 | End: 2017-07-06

## 2017-07-06 RX ORDER — ZIPRASIDONE MESYLATE 20 MG/ML
INJECTION, POWDER, LYOPHILIZED, FOR SOLUTION INTRAMUSCULAR
Status: COMPLETED
Start: 2017-07-06 | End: 2017-07-06

## 2017-07-06 RX ADMIN — ZIPRASIDONE MESYLATE 20 MG: 20 INJECTION, POWDER, LYOPHILIZED, FOR SOLUTION INTRAMUSCULAR at 18:53
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
## 2017-07-06 NOTE — DISCHARGE INSTRUCTIONS

## 2017-07-07 PROBLEM — F32.A DEPRESSION WITH SUICIDAL IDEATION: Status: ACTIVE | Noted: 2017-07-07

## 2017-07-07 PROBLEM — R45.851 DEPRESSION WITH SUICIDAL IDEATION: Status: ACTIVE | Noted: 2017-07-07

## 2017-07-07 PROBLEM — F15.151: Status: ACTIVE | Noted: 2017-07-07

## 2017-07-07 PROCEDURE — 99223 1ST HOSP IP/OBS HIGH 75: CPT | Performed by: PSYCHIATRY & NEUROLOGY

## 2017-07-07 RX ORDER — ECHINACEA PURPUREA EXTRACT 125 MG
2 TABLET ORAL AS NEEDED
Status: DISCONTINUED | OUTPATIENT
Start: 2017-07-07 | End: 2017-07-11 | Stop reason: HOSPADM

## 2017-07-07 RX ORDER — TRAZODONE HYDROCHLORIDE 50 MG/1
50 TABLET ORAL NIGHTLY PRN
Status: DISCONTINUED | OUTPATIENT
Start: 2017-07-07 | End: 2017-07-11 | Stop reason: HOSPADM

## 2017-07-07 RX ORDER — NICOTINE 21 MG/24HR
1 PATCH, TRANSDERMAL 24 HOURS TRANSDERMAL EVERY 24 HOURS
Status: DISCONTINUED | OUTPATIENT
Start: 2017-07-07 | End: 2017-07-11 | Stop reason: HOSPADM

## 2017-07-07 RX ORDER — IBUPROFEN 400 MG/1
600 TABLET ORAL EVERY 6 HOURS PRN
Status: DISCONTINUED | OUTPATIENT
Start: 2017-07-07 | End: 2017-07-11 | Stop reason: HOSPADM

## 2017-07-07 RX ORDER — OLANZAPINE 5 MG/1
5 TABLET, ORALLY DISINTEGRATING ORAL 2 TIMES DAILY
Status: DISCONTINUED | OUTPATIENT
Start: 2017-07-07 | End: 2017-07-11 | Stop reason: HOSPADM

## 2017-07-07 RX ORDER — HYDROXYZINE 50 MG/1
50 TABLET, FILM COATED ORAL EVERY 6 HOURS PRN
Status: DISCONTINUED | OUTPATIENT
Start: 2017-07-07 | End: 2017-07-11 | Stop reason: HOSPADM

## 2017-07-07 RX ORDER — BENZONATATE 100 MG/1
100 CAPSULE ORAL 3 TIMES DAILY PRN
Status: DISCONTINUED | OUTPATIENT
Start: 2017-07-07 | End: 2017-07-11 | Stop reason: HOSPADM

## 2017-07-07 RX ORDER — ALUMINA, MAGNESIA, AND SIMETHICONE 2400; 2400; 240 MG/30ML; MG/30ML; MG/30ML
15 SUSPENSION ORAL EVERY 6 HOURS PRN
Status: DISCONTINUED | OUTPATIENT
Start: 2017-07-07 | End: 2017-07-11 | Stop reason: HOSPADM

## 2017-07-07 RX ORDER — OLANZAPINE 5 MG/1
5 TABLET, ORALLY DISINTEGRATING ORAL EVERY 6 HOURS PRN
Status: DISCONTINUED | OUTPATIENT
Start: 2017-07-07 | End: 2017-07-11 | Stop reason: HOSPADM

## 2017-07-07 RX ORDER — LOPERAMIDE HYDROCHLORIDE 2 MG/1
2 CAPSULE ORAL 4 TIMES DAILY PRN
Status: DISCONTINUED | OUTPATIENT
Start: 2017-07-07 | End: 2017-07-11 | Stop reason: HOSPADM

## 2017-07-07 RX ORDER — ONDANSETRON 4 MG/1
4 TABLET, FILM COATED ORAL EVERY 6 HOURS PRN
Status: DISCONTINUED | OUTPATIENT
Start: 2017-07-07 | End: 2017-07-11 | Stop reason: HOSPADM

## 2017-07-07 RX ORDER — FAMOTIDINE 20 MG/1
20 TABLET, FILM COATED ORAL 2 TIMES DAILY PRN
Status: DISCONTINUED | OUTPATIENT
Start: 2017-07-07 | End: 2017-07-11 | Stop reason: HOSPADM

## 2017-07-07 RX ADMIN — NICOTINE 1 PATCH: 21 PATCH TRANSDERMAL at 09:28

## 2017-07-07 RX ADMIN — OLANZAPINE 5 MG: 5 TABLET, ORALLY DISINTEGRATING ORAL at 17:50

## 2017-07-07 NOTE — PLAN OF CARE
Problem:  Patient Care Overview (Adult)  Goal: Plan of Care Review  Outcome: Ongoing (interventions implemented as appropriate)    07/07/17 3495   Coping/Psychosocial Response Interventions   Plan Of Care Reviewed With patient   Coping/Psychosocial   Patient Agreement with Plan of Care agrees   Patient Care Overview   Progress no change   Outcome Evaluation   Outcome Summary/Follow up Plan Pt has isolated in room. Pt denies anxiety, depression, SI/HI/ELISHA. Pt has slept most of day. Continue to monitor.
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
## 2017-07-07 NOTE — PLAN OF CARE
Problem: BH Patient Care Overview (Adult)  Goal: Plan of Care Review  Outcome: Ongoing (interventions implemented as appropriate)    07/07/17 0110   Coping/Psychosocial Response Interventions   Plan Of Care Reviewed With patient   Coping/Psychosocial   Patient Agreement with Plan of Care agrees   Patient Care Overview   Progress no change   Outcome Evaluation   Outcome Summary/Follow up Plan New Admit

## 2017-07-07 NOTE — DISCHARGE INSTR - APPOINTMENTS
[place] [id number]  [facility] [address]  [phone]    July 17th, @ 1:45pm

## 2017-07-08 PROCEDURE — 99231 SBSQ HOSP IP/OBS SF/LOW 25: CPT | Performed by: PSYCHIATRY & NEUROLOGY

## 2017-07-08 RX ADMIN — OLANZAPINE 5 MG: 5 TABLET, ORALLY DISINTEGRATING ORAL at 09:10

## 2017-07-08 RX ADMIN — NICOTINE 1 PATCH: 21 PATCH TRANSDERMAL at 09:10

## 2017-07-08 RX ADMIN — OLANZAPINE 5 MG: 5 TABLET, ORALLY DISINTEGRATING ORAL at 17:02

## 2017-07-08 NOTE — PLAN OF CARE
Problem: BH Patient Care Overview (Adult)  Goal: Plan of Care Review  Outcome: Ongoing (interventions implemented as appropriate)    07/07/17 1725 07/08/17 0206   Coping/Psychosocial Response Interventions   Plan Of Care Reviewed With --  patient   Coping/Psychosocial   Patient Agreement with Plan of Care --  agrees   Patient Care Overview   Progress --  no change   Outcome Evaluation   Outcome Summary/Follow up Plan Pt has isolated in room. Pt denies anxiety, depression, SI/HI/ELISHA. Pt has slept most of day. Continue to monitor. --

## 2017-07-08 NOTE — PLAN OF CARE
Problem: BH Patient Care Overview (Adult)  Goal: Plan of Care Review  Outcome: Ongoing (interventions implemented as appropriate)    07/08/17 7419   Coping/Psychosocial Response Interventions   Plan Of Care Reviewed With patient   Coping/Psychosocial   Patient Agreement with Plan of Care agrees   Patient Care Overview   Progress no change   Outcome Evaluation   Outcome Summary/Follow up Plan Pt continues to isolate in room. Pt denies SI/HI/ELISHA, anxiety or depression.

## 2017-07-09 RX ADMIN — NICOTINE 1 PATCH: 21 PATCH TRANSDERMAL at 08:36

## 2017-07-09 RX ADMIN — IBUPROFEN 600 MG: 400 TABLET ORAL at 15:19

## 2017-07-09 RX ADMIN — OLANZAPINE 5 MG: 5 TABLET, ORALLY DISINTEGRATING ORAL at 16:45

## 2017-07-09 RX ADMIN — OLANZAPINE 5 MG: 5 TABLET, ORALLY DISINTEGRATING ORAL at 08:36

## 2017-07-09 NOTE — PLAN OF CARE
Problem:  Patient Care Overview (Adult)  Goal: Plan of Care Review  Outcome: Ongoing (interventions implemented as appropriate)    07/09/17 9075   Coping/Psychosocial Response Interventions   Plan Of Care Reviewed With patient   Coping/Psychosocial   Patient Agreement with Plan of Care agrees   Patient Care Overview   Progress no change   Outcome Evaluation   Outcome Summary/Follow up Plan Pt has been out of his room this afternoon. Pt denies anxiety, depression, SI/HI/ELISHA. Continue to monitor.

## 2017-07-09 NOTE — PROGRESS NOTES
"    PROGRESS NOTE    Behavioral Health Treatment Plan and Problem List: I have reviewed and approved the Behavioral Health Treatment Plan and Problem list.    ID:Cruz Mims is a 32 y.o. male    Admission Date: 2017  Hospital Day: 2 days    CC: Agitation, aggressive behavior, and substance abuse    Subjective: He stated that he doesn't have any issues. He reports that his medications have been helping. He reported he's upset about his mom who is . He denied any physical symptoms. Denied SI/HI/AVH today. Per staff remains isolative in his room.     Interval Review of Systems:   CONSTITUTIONAL:  No fever, chills, weakness or fatigue.  CARDIOVASCULAR:  No chest pain. No palpitations or edema.  RESPIRATORY:  No shortness of breath, cough or sputum.  GASTROINTESTINAL:  No nausea, vomiting or diarrhea. No abdominal pain or blood.   NEUROLOGICAL:  No headache, dizziness, ataxia, numbness or tingling in the extremities.   MUSCULOSKELETAL:  No muscle, back pain, joint pain or stiffness.  PSYCHIATRIC:  See above    Temp:  [97.5 °F (36.4 °C)-97.8 °F (36.6 °C)] 97.8 °F (36.6 °C)  Heart Rate:  [58-78] 58  Resp:  [16-18] 16  BP: ()/(59-73) 104/65    MENTAL STATUS EXAM:  Appearance:fair hygeine.   Cooperation:Cooperative  Orientation: Ox4  Gait and station stable.   Psychomotor: No psychomotor agitation/retardation, No EPS, No motor tics  Speech-mumbled, normal rate and volume  Mood \"good\"   Affect-congruent/appropriate.  Thought Content-goal directed, no delusional material present  Thought process-linear, organized.  Suicidality: No SI  Homicidality: No HI  Perception: No AH/VH  Insight-fair   Judgement-fair    Lab Results (last 24 hours)     ** No results found for the last 24 hours. **          Allergies: Amoxicillin and Penicillins      Current Facility-Administered Medications:   •  aluminum-magnesium hydroxide-simethicone (MAALOX MAX) 400-400-40 MG/5ML suspension 15 mL, 15 mL, Oral, Q6H PRN, Vadim STAUFFER" MD [place]  •  benzonatate (TESSALON) capsule 100 mg, 100 mg, Oral, TID PRN, Vadim Lozada MD  •  famotidine (PEPCID) tablet 20 mg, 20 mg, Oral, BID PRN, Vadim Lozada MD  •  hydrOXYzine (ATARAX) tablet 50 mg, 50 mg, Oral, Q6H PRN, Vadim Lozada MD  •  ibuprofen (ADVIL,MOTRIN) tablet 600 mg, 600 mg, Oral, Q6H PRN, Vadim Lozada MD  •  loperamide (IMODIUM) capsule 2 mg, 2 mg, Oral, 4x Daily PRN, Vadim Lozada MD  •  magnesium hydroxide (MILK OF MAGNESIA) suspension 2400 mg/10mL 10 mL, 10 mL, Oral, Daily PRN, Vadim Lozada MD  •  nicotine (NICODERM CQ) 21 MG/24HR patch 1 patch, 1 patch, Transdermal, Q24H, Vadim Lozada MD, 1 patch at 07/08/17 0910  •  OLANZapine zydis (zyPREXA) disintegrating tablet 5 mg, 5 mg, Oral, BID, Carlton Lozada MD, 5 mg at 07/08/17 1702  •  OLANZapine zydis (zyPREXA) disintegrating tablet 5 mg, 5 mg, Oral, Q6H PRN, Carlton Lozada MD  •  ondansetron (ZOFRAN) tablet 4 mg, 4 mg, Oral, Q6H PRN, Vadim Lozada MD  •  sodium chloride (OCEAN) nasal spray 2 spray, 2 spray, Each Nare, PRN, Vadim Lozada MD  •  traZODone (DESYREL) tablet 50 mg, 50 mg, Oral, Nightly PRN, Vadim Lozada MD  •  aluminum-magnesium hydroxide-simethicone  •  benzonatate  •  famotidine  •  hydrOXYzine  •  ibuprofen  •  loperamide  •  magnesium hydroxide  •  OLANZapine zydis  •  ondansetron  •  sodium chloride  •  traZODone    Safety Precautions: SP LEVEL III    Assessment/Diagnosis: Active Problems:    Shared psychotic disorder    Methamphetamine abuse    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    Attestation:   Physician Attestation: I attest that the above note accurately reflects work and decisions made by me.      Clinician: Guerita Pruett MD  10:21 PM 07/08/17

## 2017-07-09 NOTE — PLAN OF CARE
Problem: BH Patient Care Overview (Adult)  Goal: Plan of Care Review  Outcome: Ongoing (interventions implemented as appropriate)    07/09/17 0211   Coping/Psychosocial Response Interventions   Plan Of Care Reviewed With patient   Coping/Psychosocial   Patient Agreement with Plan of Care agrees   Patient Care Overview   Progress no change   Outcome Evaluation   Outcome Summary/Follow up Plan continues to isolate in his room, rates anxiety and depression a 2, denies si/hi/sandra.

## 2017-07-10 PROBLEM — R41.83 MENTAL RETARDATION, BORDERLINE (I.Q. 70-85): Status: ACTIVE | Noted: 2017-07-10

## 2017-07-10 PROCEDURE — 99232 SBSQ HOSP IP/OBS MODERATE 35: CPT | Performed by: PSYCHIATRY & NEUROLOGY

## 2017-07-10 RX ADMIN — OLANZAPINE 5 MG: 5 TABLET, ORALLY DISINTEGRATING ORAL at 16:55

## 2017-07-10 RX ADMIN — OLANZAPINE 5 MG: 5 TABLET, ORALLY DISINTEGRATING ORAL at 08:45

## 2017-07-10 RX ADMIN — NICOTINE 1 PATCH: 21 PATCH TRANSDERMAL at 08:45

## 2017-07-10 NOTE — PLAN OF CARE
Problem:  Patient Care Overview (Adult)  Goal: Individualization and Mutuality  Outcome: Ongoing (interventions implemented as appropriate)    07/07/17 1114   Behavioral Health Screens   Patient Personal Strengths motivated for recovery;motivated for treatment;family/social support   Patient Vulnerabilities Ineffective coping skills; suffers from schizophrenia; limited insight and judgment   Individualization   Patient Specific Goals Patient will identify 1-2 healthy coping skills prior to discharge   DATA: Met with patient who was resting in his bed.  Patient reported that he had stopped taking the medication he needed to take and had started using drugs.  Patient reported that he understands that he needs to take his medication and that he does not need to engage in substance abuse.  He reported that he is hopeful to return home tomorrow.  Patient reported that he is feeling better today.  Attempted contact with patients father and was informed his father is not home but would be home later today.  Will attempt again to contact patients father regarding possible discharge tomorrow.    Received a message from nursing staff that patients father called back.  Attempted to call patients father back at 15:04 and left a message for a return call.     ASSESSMENT:  Patient denies suicidal ideation and denies homicidal ideation.  Patient reports he is ready to discharge home.  Will continue efforts to review discharge with patients father.     PLAN:  Patient will continue stabilization and is planned for discharge tomorrow.  Patient will [facility] up with [facility].  Therapist will continue efforts to contact patients father.

## 2017-07-10 NOTE — PROGRESS NOTES
"      PROGRESS NOTE  Clinician: SHORTY Lozada MD  Admission Date: 7/6/2017  7:55 AM 07/10/17    Behavioral Health Treatment Plan and Problem List: I have reviewed and approved the Behavioral Health Treatment Plan and Problem list.    Allergies  Allergies   Allergen Reactions   • Amoxicillin    • Penicillins        Hospital Day: 4 days      Assessment completed within view of staff    History    CC:\"I'm doing great, ready to go home today\"    Followed for: depression/ psychosis/ Methamphetamine Abuse/ borderline low intellect.     Interval HPI: Patient seen and evaluated by me.  Chart reviewed.  \"Medicine is helping, don't want to hit the walls or hurt myself no more\", \"Don't have an anger problem now, I was doing a lot of drugs, crank and joints\".   Not motivated for focused CD treatment.   No paranoia, no hallucination, \"not depressed\".   Will contact the patient's family today and coordinate discharging the patient in the morning.   His future plans: \"get my 's permit, get a girl friend\"   On RAIT IQ probably in the 70 to 80 range.   Will need follow up with [facility].      Interval Review of Systems:   General ROS: negative for - fever or malaise  Endocrine ROS: negative for - palpitations  Respiratory ROS: no cough, shortness of breath, or wheezing  Cardiovascular ROS: no chest pain or dyspnea on exertion  Gastrointestinal ROS: no abdominal pain,no black or bloody stools    /69 (BP Location: Left arm, Patient Position: Lying)  Pulse 58  Temp 99.6 °F (37.6 °C) (Tympanic)   Resp 16  Ht 68\" (172.7 cm)  Wt 139 lb (63 kg)  SpO2 98%  BMI 21.13 kg/m2    Mental Status Exam    Mood/Affect: appropriate  Thought Processes:  associations intact  Thought Content:  Normal  Hallucination(s): none  Hopelessness: no  Optimistic:minimally  Suicidal Thoughts:  none  Suicidal Plan/Intent: none  Homicidal Thoughts:  absent      Medical Decision Making:   All recent completed LAB results reviewed and " discussed with the patient.        Radiology:     Imaging Results (last 24 hours)     ** No results found for the last 24 hours. **            EKG:     ECG/EMG Results (most recent)     None           Medications:     nicotine 1 patch Transdermal Q24H   OLANZapine zydis 5 mg Oral BID       •  aluminum-magnesium hydroxide-simethicone  •  benzonatate  •  famotidine  •  hydrOXYzine  •  ibuprofen  •  loperamide  •  magnesium hydroxide  •  OLANZapine zydis  •  ondansetron  •  sodium chloride  •  traZODone   All medications reviewed.    Special Precautions: Continue current level of Special Precautions.    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    Attestation:   Physician Attestation: I attest that the above note accurately reflects work and decisions made by me.

## 2017-07-10 NOTE — PLAN OF CARE
Problem: BH Patient Care Overview (Adult)  Goal: Plan of Care Review  Outcome: Ongoing (interventions implemented as appropriate)    07/10/17 0205   Coping/Psychosocial Response Interventions   Plan Of Care Reviewed With patient   Coping/Psychosocial   Patient Agreement with Plan of Care agrees   Patient Care Overview   Progress no change   Outcome Evaluation   Outcome Summary/Follow up Plan Rates anxiety and depression a 0, deneis ai/hi/sandra.

## 2017-07-10 NOTE — PLAN OF CARE
Problem:  Patient Care Overview (Adult)  Goal: Plan of Care Review  Outcome: Ongoing (interventions implemented as appropriate)    07/10/17 0596   Coping/Psychosocial Response Interventions   Plan Of Care Reviewed With patient   Coping/Psychosocial   Patient Agreement with Plan of Care agrees   Patient Care Overview   Progress improving   Outcome Evaluation   Outcome Summary/Follow up Plan CLIENT DENIES ANXIETY AND DEPRESSION DURING ASSESSMENT. CLIENT IS CALM AND COOPERATIVE DURING SHIFT BUT STAYS IN BED OFTEN. CLIENT IS HOPEFUL ABOUT GOING HOME TOMORROW.         Problem:  Overarching Goals  Goal: Adheres to Safety Considerations for Self and Others  Outcome: Ongoing (interventions implemented as appropriate)  Goal: Optimized Coping Skills in Response to Life Stressors  Outcome: Ongoing (interventions implemented as appropriate)  Goal: Develops/Participates in Therapeutic [place] to Support Successful Transition  Outcome: Ongoing (interventions implemented as appropriate)

## 2017-07-11 VITALS
BODY MASS INDEX: 21.07 KG/M2 | HEIGHT: 68 IN | RESPIRATION RATE: 18 BRPM | TEMPERATURE: 97.9 F | OXYGEN SATURATION: 95 % | SYSTOLIC BLOOD PRESSURE: 90 MMHG | WEIGHT: 139 LBS | HEART RATE: 69 BPM | DIASTOLIC BLOOD PRESSURE: 57 MMHG

## 2017-07-11 PROCEDURE — 99238 HOSP IP/OBS DSCHRG MGMT 30/<: CPT | Performed by: PSYCHIATRY & NEUROLOGY

## 2017-07-11 RX ORDER — OLANZAPINE 5 MG/1
5 TABLET ORAL NIGHTLY
Qty: 30 TABLET | Refills: 0 | Status: ON HOLD | OUTPATIENT
Start: 2017-07-11 | End: 2018-02-22

## 2017-07-11 RX ADMIN — NICOTINE 1 PATCH: 21 PATCH TRANSDERMAL at 08:01

## 2017-07-11 RX ADMIN — OLANZAPINE 5 MG: 5 TABLET, ORALLY DISINTEGRATING ORAL at 08:01

## 2017-07-11 NOTE — PLAN OF CARE
Problem: BH Patient Care Overview (Adult)  Goal: Plan of Care Review    07/10/17 1742 07/10/17 2000   Coping/Psychosocial Response Interventions   Plan Of Care Reviewed With --  patient   Coping/Psychosocial   Patient Agreement with Plan of Care --  agrees   Patient Care Overview   Progress improving --    Outcome Evaluation   Outcome Summary/Follow up Plan CLIENT DENIES ANXIETY AND DEPRESSION DURING ASSESSMENT. CLIENT IS CALM AND COOPERATIVE DURING SHIFT BUT STAYS IN BED OFTEN. CLIENT IS HOPEFUL ABOUT GOING HOME TOMORROW. --

## 2017-07-11 NOTE — PROGRESS NOTES
Spoke with patients mother briefly and she reported that patients father was out.  She was agreeable to patient returning home and reported that she will pray he stays away from substances.    Patient father then returned a call and stated that patient had been on an [facility] injection but his car had broken down so he as unable to get patient to the pharmacy.  Patient father reported that he can transport him now, encouraged him to contact the pharmacy to determine the status of the prescription and he was agreeable.  Informed him that patient would need to attend his follow up appointment with [facility] in order to continue his medication and patients father reported that he has been unable to get patient to go to follow up appointments.

## 2017-07-11 NOTE — DISCHARGE SUMMARY
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 70-85)        Presenting Problem/History of Present Illness  Depression with suicidal ideation [F32.9, R45.851]     Hospital Course  Patient is a 32 y.o. male presented with bizarre aggressive behavior with suicidal intent.  Patient placed on special precautions and signed a master level therapist to assist in a collaborative treatment effort.  Patient seen on a daily basis for [facility].  Patient was started on Zyprexa 5 mg twice a day and his psychosis, confusion, depression improved steadily throughout his stay.  Date of discharge patient was clear of any psychosis, thoughts of harming self or others and stated a plan that included \"taking my medicine, listening to my parents, take one day at a time, no drugs, and follow-up with Comp care\".  Therapist attempted on several occasions to contact patient's father unsuccessfully, will continue to do so until the patient actually leaves the hospital.  Patient should be safe for outpatient treatment format.  See below for medication.    Procedures Performed none         Consults:   Consults     No orders found from 6/7/2017 to 7/7/2017.          Pertinent Test Results:   Lab Results (last 7 days)     ** No results found for the last 168 hours. **            Condition on Discharge:  improved    Vital Signs  Temp:  [96 °F (35.6 °C)-98.2 °F (36.8 °C)] 97.9 °F (36.6 °C)  Heart Rate:  [69-95] 69  Resp:  [18] 18  BP: ()/(57-79) 90/57      Discharge Disposition  Home or Self Care    Discharge Medications   Cruz Mims   Home Medication Instructions ALENA:[id number]    Printed on:07/11/17 0958   Medication Information                      OLANZapine (ZYPREXA) 5 MG tablet  Take 1 tablet by " mouth Every Night.                 Discharge Diet:  regular    Activity at Discharge:  no restrictions    Follow-up Appointments: [facility] clinic.      Test Results Pending at Discharge none       Carlton Lozada MD  07/11/17  9:58 AM

## 2018-02-22 ENCOUNTER — HOSPITAL ENCOUNTER (INPATIENT)
Facility: HOSPITAL | Age: 33
LOS: 4 days | Discharge: HOME | End: 2018-02-26
Attending: PSYCHIATRY & NEUROLOGY | Admitting: PSYCHIATRY & NEUROLOGY

## 2018-02-22 ENCOUNTER — HOSPITAL ENCOUNTER (EMERGENCY)
Facility: HOSPITAL | Age: 33
Discharge: PSYCHIATRIC OR SUBSTANCE USE FACILITY | End: 2018-02-22
Attending: EMERGENCY MEDICINE | Admitting: EMERGENCY MEDICINE

## 2018-02-22 VITALS
DIASTOLIC BLOOD PRESSURE: 82 MMHG | HEIGHT: 68 IN | TEMPERATURE: 98.4 F | RESPIRATION RATE: 18 BRPM | HEART RATE: 94 BPM | SYSTOLIC BLOOD PRESSURE: 132 MMHG | WEIGHT: 150 LBS | BODY MASS INDEX: 22.73 KG/M2 | OXYGEN SATURATION: 100 %

## 2018-02-22 DIAGNOSIS — R45.851 DEPRESSION WITH SUICIDAL IDEATION: Primary | ICD-10-CM

## 2018-02-22 DIAGNOSIS — F32.A DEPRESSION WITH SUICIDAL IDEATION: Primary | ICD-10-CM

## 2018-02-22 LAB
6-ACETYL MORPHINE: NEGATIVE
ALBUMIN SERPL-MCNC: 4.3 G/DL (ref 3.5–5)
ALBUMIN/GLOB SERPL: 1.6 G/DL (ref 1.5–2.5)
ALP SERPL-CCNC: 64 U/L (ref 40–129)
ALT SERPL W P-5'-P-CCNC: 19 U/L (ref 10–44)
AMPHET+METHAMPHET UR QL: NEGATIVE
ANION GAP SERPL CALCULATED.3IONS-SCNC: 6.1 MMOL/L (ref 3.6–11.2)
AST SERPL-CCNC: 23 U/L (ref 10–34)
BARBITURATES UR QL SCN: NEGATIVE
BASOPHILS # BLD AUTO: 0.02 10*3/MM3 (ref 0–0.3)
BASOPHILS NFR BLD AUTO: 0.2 % (ref 0–2)
BENZODIAZ UR QL SCN: NEGATIVE
BILIRUB SERPL-MCNC: 0.3 MG/DL (ref 0.2–1.8)
BILIRUB UR QL STRIP: NEGATIVE
BUN BLD-MCNC: 14 MG/DL (ref 7–21)
BUN/CREAT SERPL: 17.3 (ref 7–25)
BUPRENORPHINE SERPL-MCNC: NEGATIVE NG/ML
CALCIUM SPEC-SCNC: 9.3 MG/DL (ref 7.7–10)
CANNABINOIDS SERPL QL: POSITIVE
CHLORIDE SERPL-SCNC: 108 MMOL/L (ref 99–112)
CLARITY UR: ABNORMAL
CO2 SERPL-SCNC: 25.9 MMOL/L (ref 24.3–31.9)
COCAINE UR QL: NEGATIVE
COLOR UR: YELLOW
CREAT BLD-MCNC: 0.81 MG/DL (ref 0.43–1.29)
DEPRECATED RDW RBC AUTO: 44.9 FL (ref 37–54)
EOSINOPHIL # BLD AUTO: 0.18 10*3/MM3 (ref 0–0.7)
EOSINOPHIL NFR BLD AUTO: 2 % (ref 0–5)
ERYTHROCYTE [DISTWIDTH] IN BLOOD BY AUTOMATED COUNT: 13.7 % (ref 11.5–14.5)
ETHANOL BLD-MCNC: <10 MG/DL
ETHANOL UR QL: <0.01 %
GFR SERPL CREATININE-BSD FRML MDRD: 111 ML/MIN/1.73
GLOBULIN UR ELPH-MCNC: 2.7 GM/DL
GLUCOSE BLD-MCNC: 114 MG/DL (ref 70–110)
GLUCOSE UR STRIP-MCNC: NEGATIVE MG/DL
HCT VFR BLD AUTO: 42.7 % (ref 42–52)
HGB BLD-MCNC: 14.1 G/DL (ref 14–18)
HGB UR QL STRIP.AUTO: NEGATIVE
IMM GRANULOCYTES # BLD: 0.02 10*3/MM3 (ref 0–0.03)
IMM GRANULOCYTES NFR BLD: 0.2 % (ref 0–0.5)
KETONES UR QL STRIP: NEGATIVE
LEUKOCYTE ESTERASE UR QL STRIP.AUTO: NEGATIVE
LYMPHOCYTES # BLD AUTO: 2.6 10*3/MM3 (ref 1–3)
LYMPHOCYTES NFR BLD AUTO: 28.3 % (ref 21–51)
MCH RBC QN AUTO: 30.7 PG (ref 27–33)
MCHC RBC AUTO-ENTMCNC: 33 G/DL (ref 33–37)
MCV RBC AUTO: 93 FL (ref 80–94)
METHADONE UR QL SCN: NEGATIVE
MONOCYTES # BLD AUTO: 0.69 10*3/MM3 (ref 0.1–0.9)
MONOCYTES NFR BLD AUTO: 7.5 % (ref 0–10)
NEUTROPHILS # BLD AUTO: 5.69 10*3/MM3 (ref 1.4–6.5)
NEUTROPHILS NFR BLD AUTO: 61.8 % (ref 30–70)
NITRITE UR QL STRIP: NEGATIVE
OPIATES UR QL: NEGATIVE
OSMOLALITY SERPL CALC.SUM OF ELEC: 280.7 MOSM/KG (ref 273–305)
OXYCODONE UR QL SCN: NEGATIVE
PCP UR QL SCN: NEGATIVE
PH UR STRIP.AUTO: 7 [PH] (ref 5–8)
PLATELET # BLD AUTO: 205 10*3/MM3 (ref 130–400)
PMV BLD AUTO: 11.7 FL (ref 6–10)
POTASSIUM BLD-SCNC: 4.1 MMOL/L (ref 3.5–5.3)
PROT SERPL-MCNC: 7 G/DL (ref 6–8)
PROT UR QL STRIP: NEGATIVE
RBC # BLD AUTO: 4.59 10*6/MM3 (ref 4.7–6.1)
SODIUM BLD-SCNC: 140 MMOL/L (ref 135–153)
SP GR UR STRIP: 1.02 (ref 1–1.03)
UROBILINOGEN UR QL STRIP: ABNORMAL
WBC NRBC COR # BLD: 9.2 10*3/MM3 (ref 4.5–12.5)

## 2018-02-22 PROCEDURE — 80307 DRUG TEST PRSMV CHEM ANLYZR: CPT | Performed by: EMERGENCY MEDICINE

## 2018-02-22 PROCEDURE — 81003 URINALYSIS AUTO W/O SCOPE: CPT | Performed by: EMERGENCY MEDICINE

## 2018-02-22 PROCEDURE — 80053 COMPREHEN METABOLIC PANEL: CPT | Performed by: EMERGENCY MEDICINE

## 2018-02-22 PROCEDURE — 85025 COMPLETE CBC W/AUTO DIFF WBC: CPT | Performed by: EMERGENCY MEDICINE

## 2018-02-22 PROCEDURE — 99285 EMERGENCY DEPT VISIT HI MDM: CPT

## 2018-02-22 RX ORDER — TRAZODONE HYDROCHLORIDE 100 MG/1
100 TABLET ORAL NIGHTLY
COMMUNITY
End: 2018-02-26 | Stop reason: HOSPADM

## 2018-02-22 RX ORDER — TRAZODONE HYDROCHLORIDE 50 MG/1
100 TABLET ORAL NIGHTLY
Status: CANCELLED | OUTPATIENT
Start: 2018-02-23

## 2018-02-23 PROBLEM — R45.851 DEPRESSION WITH SUICIDAL IDEATION: Status: ACTIVE | Noted: 2018-02-23

## 2018-02-23 PROBLEM — F15.14: Status: ACTIVE | Noted: 2017-07-07

## 2018-02-23 PROBLEM — F32.A DEPRESSION WITH SUICIDAL IDEATION: Status: ACTIVE | Noted: 2018-02-23

## 2018-02-23 PROCEDURE — 99223 1ST HOSP IP/OBS HIGH 75: CPT | Performed by: PSYCHIATRY & NEUROLOGY

## 2018-02-23 PROCEDURE — 93010 ELECTROCARDIOGRAM REPORT: CPT | Performed by: INTERNAL MEDICINE

## 2018-02-23 PROCEDURE — 93005 ELECTROCARDIOGRAM TRACING: CPT | Performed by: PSYCHIATRY & NEUROLOGY

## 2018-02-23 RX ORDER — ALUMINA, MAGNESIA, AND SIMETHICONE 2400; 2400; 240 MG/30ML; MG/30ML; MG/30ML
15 SUSPENSION ORAL EVERY 6 HOURS PRN
Status: DISCONTINUED | OUTPATIENT
Start: 2018-02-23 | End: 2018-02-26 | Stop reason: HOSPADM

## 2018-02-23 RX ORDER — LOPERAMIDE HYDROCHLORIDE 2 MG/1
2 CAPSULE ORAL 4 TIMES DAILY PRN
Status: DISCONTINUED | OUTPATIENT
Start: 2018-02-23 | End: 2018-02-26 | Stop reason: HOSPADM

## 2018-02-23 RX ORDER — HYDROXYZINE 50 MG/1
50 TABLET, FILM COATED ORAL EVERY 6 HOURS PRN
Status: DISCONTINUED | OUTPATIENT
Start: 2018-02-23 | End: 2018-02-26 | Stop reason: HOSPADM

## 2018-02-23 RX ORDER — ONDANSETRON 4 MG/1
4 TABLET, FILM COATED ORAL EVERY 6 HOURS PRN
Status: DISCONTINUED | OUTPATIENT
Start: 2018-02-23 | End: 2018-02-26 | Stop reason: HOSPADM

## 2018-02-23 RX ORDER — TRAZODONE HYDROCHLORIDE 50 MG/1
50 TABLET ORAL NIGHTLY PRN
Status: DISCONTINUED | OUTPATIENT
Start: 2018-02-23 | End: 2018-02-26 | Stop reason: HOSPADM

## 2018-02-23 RX ORDER — IBUPROFEN 600 MG/1
600 TABLET ORAL EVERY 6 HOURS PRN
Status: DISCONTINUED | OUTPATIENT
Start: 2018-02-23 | End: 2018-02-26 | Stop reason: HOSPADM

## 2018-02-23 RX ORDER — FAMOTIDINE 20 MG/1
20 TABLET, FILM COATED ORAL 2 TIMES DAILY PRN
Status: DISCONTINUED | OUTPATIENT
Start: 2018-02-23 | End: 2018-02-26 | Stop reason: HOSPADM

## 2018-02-23 RX ORDER — BENZONATATE 100 MG/1
100 CAPSULE ORAL 3 TIMES DAILY PRN
Status: DISCONTINUED | OUTPATIENT
Start: 2018-02-23 | End: 2018-02-26 | Stop reason: HOSPADM

## 2018-02-23 RX ORDER — OLANZAPINE 5 MG/1
5 TABLET ORAL 2 TIMES DAILY
Status: DISCONTINUED | OUTPATIENT
Start: 2018-02-23 | End: 2018-02-26 | Stop reason: HOSPADM

## 2018-02-23 RX ORDER — ECHINACEA PURPUREA EXTRACT 125 MG
2 TABLET ORAL AS NEEDED
Status: DISCONTINUED | OUTPATIENT
Start: 2018-02-23 | End: 2018-02-26 | Stop reason: HOSPADM

## 2018-02-23 RX ADMIN — HYDROXYZINE HYDROCHLORIDE 50 MG: 50 TABLET ORAL at 08:23

## 2018-02-23 RX ADMIN — OLANZAPINE 5 MG: 5 TABLET, FILM COATED ORAL at 12:31

## 2018-02-23 RX ADMIN — TRAZODONE HYDROCHLORIDE 50 MG: 50 TABLET ORAL at 21:33

## 2018-02-23 RX ADMIN — OLANZAPINE 5 MG: 5 TABLET, FILM COATED ORAL at 21:33

## 2018-02-23 RX ADMIN — HYDROXYZINE HYDROCHLORIDE 50 MG: 50 TABLET ORAL at 21:33

## 2018-02-24 LAB
ALBUMIN SERPL-MCNC: 3.9 G/DL (ref 3.5–5)
ALBUMIN/GLOB SERPL: 1.6 G/DL (ref 1.5–2.5)
ALP SERPL-CCNC: 54 U/L (ref 40–129)
ALT SERPL W P-5'-P-CCNC: 16 U/L (ref 10–44)
ANION GAP SERPL CALCULATED.3IONS-SCNC: 4.1 MMOL/L (ref 3.6–11.2)
AST SERPL-CCNC: 16 U/L (ref 10–34)
BILIRUB CONJ SERPL-MCNC: 0.1 MG/DL (ref 0–0.2)
BILIRUB SERPL-MCNC: 0.2 MG/DL (ref 0.2–1.8)
BUN BLD-MCNC: 19 MG/DL (ref 7–21)
BUN/CREAT SERPL: 29.7 (ref 7–25)
CALCIUM SPEC-SCNC: 8.9 MG/DL (ref 7.7–10)
CHLORIDE SERPL-SCNC: 111 MMOL/L (ref 99–112)
CHOLEST SERPL-MCNC: 113 MG/DL (ref 0–200)
CO2 SERPL-SCNC: 24.9 MMOL/L (ref 24.3–31.9)
CREAT BLD-MCNC: 0.64 MG/DL (ref 0.43–1.29)
GFR SERPL CREATININE-BSD FRML MDRD: 146 ML/MIN/1.73
GLOBULIN UR ELPH-MCNC: 2.5 GM/DL
GLUCOSE BLD-MCNC: 99 MG/DL (ref 70–110)
HBA1C MFR BLD: 5.4 % (ref 4.5–5.7)
HDLC SERPL-MCNC: 45 MG/DL (ref 60–100)
LDLC SERPL CALC-MCNC: 51 MG/DL (ref 0–100)
LDLC/HDLC SERPL: 1.13 {RATIO}
OSMOLALITY SERPL CALC.SUM OF ELEC: 281.7 MOSM/KG (ref 273–305)
POTASSIUM BLD-SCNC: 4.4 MMOL/L (ref 3.5–5.3)
PROT SERPL-MCNC: 6.4 G/DL (ref 6–8)
SODIUM BLD-SCNC: 140 MMOL/L (ref 135–153)
TRIGL SERPL-MCNC: 85 MG/DL (ref 0–150)
VLDLC SERPL-MCNC: 17 MG/DL

## 2018-02-24 PROCEDURE — 80061 LIPID PANEL: CPT | Performed by: PSYCHIATRY & NEUROLOGY

## 2018-02-24 PROCEDURE — 99232 SBSQ HOSP IP/OBS MODERATE 35: CPT | Performed by: PSYCHIATRY & NEUROLOGY

## 2018-02-24 PROCEDURE — 83036 HEMOGLOBIN GLYCOSYLATED A1C: CPT | Performed by: PSYCHIATRY & NEUROLOGY

## 2018-02-24 PROCEDURE — 82248 BILIRUBIN DIRECT: CPT | Performed by: PSYCHIATRY & NEUROLOGY

## 2018-02-24 PROCEDURE — 86702 HIV-2 ANTIBODY: CPT | Performed by: PSYCHIATRY & NEUROLOGY

## 2018-02-24 PROCEDURE — 80053 COMPREHEN METABOLIC PANEL: CPT | Performed by: PSYCHIATRY & NEUROLOGY

## 2018-02-24 RX ADMIN — OLANZAPINE 5 MG: 5 TABLET, FILM COATED ORAL at 21:26

## 2018-02-24 RX ADMIN — OLANZAPINE 5 MG: 5 TABLET, FILM COATED ORAL at 08:30

## 2018-02-25 PROCEDURE — 99232 SBSQ HOSP IP/OBS MODERATE 35: CPT | Performed by: PSYCHIATRY & NEUROLOGY

## 2018-02-25 RX ADMIN — HYDROXYZINE HYDROCHLORIDE 50 MG: 50 TABLET ORAL at 14:00

## 2018-02-25 RX ADMIN — OLANZAPINE 5 MG: 5 TABLET, FILM COATED ORAL at 08:06

## 2018-02-25 RX ADMIN — IBUPROFEN 600 MG: 600 TABLET ORAL at 14:00

## 2018-02-25 RX ADMIN — OLANZAPINE 5 MG: 5 TABLET, FILM COATED ORAL at 20:30

## 2018-02-26 VITALS
TEMPERATURE: 97.6 F | SYSTOLIC BLOOD PRESSURE: 129 MMHG | HEART RATE: 107 BPM | DIASTOLIC BLOOD PRESSURE: 77 MMHG | HEIGHT: 68 IN | RESPIRATION RATE: 18 BRPM | BODY MASS INDEX: 22.81 KG/M2 | OXYGEN SATURATION: 97 %

## 2018-02-26 PROBLEM — R45.851 DEPRESSION WITH SUICIDAL IDEATION: Status: RESOLVED | Noted: 2018-02-23 | Resolved: 2018-02-26

## 2018-02-26 PROBLEM — F32.A DEPRESSION WITH SUICIDAL IDEATION: Status: RESOLVED | Noted: 2018-02-23 | Resolved: 2018-02-26

## 2018-02-26 LAB — HIV 2 AB SERPL QL IA: NEGATIVE

## 2018-02-26 PROCEDURE — 99239 HOSP IP/OBS DSCHRG MGMT >30: CPT | Performed by: PSYCHIATRY & NEUROLOGY

## 2018-02-26 RX ORDER — OLANZAPINE 5 MG/1
5 TABLET ORAL 2 TIMES DAILY
Qty: 30 TABLET | Refills: 0 | Status: ON HOLD | OUTPATIENT
Start: 2018-02-26 | End: 2018-07-10

## 2018-02-26 RX ADMIN — OLANZAPINE 5 MG: 5 TABLET, FILM COATED ORAL at 08:01

## 2018-07-06 ENCOUNTER — HOSPITAL ENCOUNTER (EMERGENCY)
Facility: HOSPITAL | Age: 33
Discharge: PSYCHIATRIC OR SUBSTANCE USE FACILITY | End: 2018-07-07
Attending: EMERGENCY MEDICINE | Admitting: EMERGENCY MEDICINE

## 2018-07-06 DIAGNOSIS — F25.9 SCHIZOAFFECTIVE DISORDER, UNSPECIFIED TYPE (HCC): Primary | ICD-10-CM

## 2018-07-06 PROCEDURE — 99284 EMERGENCY DEPT VISIT MOD MDM: CPT

## 2018-07-07 ENCOUNTER — HOSPITAL ENCOUNTER (INPATIENT)
Facility: HOSPITAL | Age: 33
LOS: 3 days | Discharge: HOME | End: 2018-07-10
Attending: PSYCHIATRY & NEUROLOGY | Admitting: PSYCHIATRY & NEUROLOGY

## 2018-07-07 VITALS
TEMPERATURE: 98.5 F | BODY MASS INDEX: 22.73 KG/M2 | OXYGEN SATURATION: 97 % | HEIGHT: 68 IN | DIASTOLIC BLOOD PRESSURE: 81 MMHG | RESPIRATION RATE: 18 BRPM | HEART RATE: 82 BPM | SYSTOLIC BLOOD PRESSURE: 123 MMHG | WEIGHT: 150 LBS

## 2018-07-07 PROBLEM — F29 PSYCHOSIS (HCC): Status: ACTIVE | Noted: 2018-07-07

## 2018-07-07 LAB
6-ACETYL MORPHINE: NEGATIVE
ALBUMIN SERPL-MCNC: 4.1 G/DL (ref 3.5–5)
ALBUMIN/GLOB SERPL: 1.5 G/DL (ref 1.5–2.5)
ALP SERPL-CCNC: 69 U/L (ref 40–129)
ALT SERPL W P-5'-P-CCNC: 57 U/L (ref 10–44)
AMPHET+METHAMPHET UR QL: NEGATIVE
ANION GAP SERPL CALCULATED.3IONS-SCNC: 5.6 MMOL/L (ref 3.6–11.2)
AST SERPL-CCNC: 36 U/L (ref 10–34)
BARBITURATES UR QL SCN: NEGATIVE
BASOPHILS # BLD AUTO: 0.03 10*3/MM3 (ref 0–0.3)
BASOPHILS NFR BLD AUTO: 0.4 % (ref 0–2)
BENZODIAZ UR QL SCN: NEGATIVE
BILIRUB SERPL-MCNC: 0.2 MG/DL (ref 0.2–1.8)
BILIRUB UR QL STRIP: NEGATIVE
BUN BLD-MCNC: 16 MG/DL (ref 7–21)
BUN/CREAT SERPL: 20.5 (ref 7–25)
BUPRENORPHINE SERPL-MCNC: NEGATIVE NG/ML
CALCIUM SPEC-SCNC: 8.8 MG/DL (ref 7.7–10)
CANNABINOIDS SERPL QL: POSITIVE
CHLORIDE SERPL-SCNC: 108 MMOL/L (ref 99–112)
CLARITY UR: CLEAR
CO2 SERPL-SCNC: 25.4 MMOL/L (ref 24.3–31.9)
COCAINE UR QL: NEGATIVE
COLOR UR: YELLOW
CREAT BLD-MCNC: 0.78 MG/DL (ref 0.43–1.29)
DEPRECATED RDW RBC AUTO: 43.5 FL (ref 37–54)
EOSINOPHIL # BLD AUTO: 0.12 10*3/MM3 (ref 0–0.7)
EOSINOPHIL NFR BLD AUTO: 1.5 % (ref 0–5)
ERYTHROCYTE [DISTWIDTH] IN BLOOD BY AUTOMATED COUNT: 13.1 % (ref 11.5–14.5)
ETHANOL BLD-MCNC: <10 MG/DL
ETHANOL UR QL: <0.01 %
GFR SERPL CREATININE-BSD FRML MDRD: 116 ML/MIN/1.73
GLOBULIN UR ELPH-MCNC: 2.8 GM/DL
GLUCOSE BLD-MCNC: 128 MG/DL (ref 70–110)
GLUCOSE UR STRIP-MCNC: NEGATIVE MG/DL
HCT VFR BLD AUTO: 41.3 % (ref 42–52)
HGB BLD-MCNC: 14 G/DL (ref 14–18)
HGB UR QL STRIP.AUTO: NEGATIVE
IMM GRANULOCYTES # BLD: 0.01 10*3/MM3 (ref 0–0.03)
IMM GRANULOCYTES NFR BLD: 0.1 % (ref 0–0.5)
KETONES UR QL STRIP: ABNORMAL
LEUKOCYTE ESTERASE UR QL STRIP.AUTO: NEGATIVE
LYMPHOCYTES # BLD AUTO: 2.45 10*3/MM3 (ref 1–3)
LYMPHOCYTES NFR BLD AUTO: 31.3 % (ref 21–51)
MCH RBC QN AUTO: 31.2 PG (ref 27–33)
MCHC RBC AUTO-ENTMCNC: 33.9 G/DL (ref 33–37)
MCV RBC AUTO: 92 FL (ref 80–94)
METHADONE UR QL SCN: NEGATIVE
MONOCYTES # BLD AUTO: 0.32 10*3/MM3 (ref 0.1–0.9)
MONOCYTES NFR BLD AUTO: 4.1 % (ref 0–10)
NEUTROPHILS # BLD AUTO: 4.91 10*3/MM3 (ref 1.4–6.5)
NEUTROPHILS NFR BLD AUTO: 62.6 % (ref 30–70)
NITRITE UR QL STRIP: NEGATIVE
OPIATES UR QL: NEGATIVE
OSMOLALITY SERPL CALC.SUM OF ELEC: 280.4 MOSM/KG (ref 273–305)
OXYCODONE UR QL SCN: NEGATIVE
PCP UR QL SCN: NEGATIVE
PH UR STRIP.AUTO: 5.5 [PH] (ref 5–8)
PLATELET # BLD AUTO: 240 10*3/MM3 (ref 130–400)
PMV BLD AUTO: 11.1 FL (ref 6–10)
POTASSIUM BLD-SCNC: 4 MMOL/L (ref 3.5–5.3)
PROT SERPL-MCNC: 6.9 G/DL (ref 6–8)
PROT UR QL STRIP: NEGATIVE
RBC # BLD AUTO: 4.49 10*6/MM3 (ref 4.7–6.1)
SODIUM BLD-SCNC: 139 MMOL/L (ref 135–153)
SP GR UR STRIP: >1.03 (ref 1–1.03)
UROBILINOGEN UR QL STRIP: ABNORMAL
WBC NRBC COR # BLD: 7.84 10*3/MM3 (ref 4.5–12.5)

## 2018-07-07 PROCEDURE — 80307 DRUG TEST PRSMV CHEM ANLYZR: CPT | Performed by: NURSE PRACTITIONER

## 2018-07-07 PROCEDURE — 85025 COMPLETE CBC W/AUTO DIFF WBC: CPT | Performed by: NURSE PRACTITIONER

## 2018-07-07 PROCEDURE — 99223 1ST HOSP IP/OBS HIGH 75: CPT | Performed by: PSYCHIATRY & NEUROLOGY

## 2018-07-07 PROCEDURE — 80053 COMPREHEN METABOLIC PANEL: CPT | Performed by: NURSE PRACTITIONER

## 2018-07-07 PROCEDURE — 81003 URINALYSIS AUTO W/O SCOPE: CPT | Performed by: NURSE PRACTITIONER

## 2018-07-07 PROCEDURE — 25010000002 ZIPRASIDONE MESYLATE PER 10 MG

## 2018-07-07 PROCEDURE — 93005 ELECTROCARDIOGRAM TRACING: CPT | Performed by: PSYCHIATRY & NEUROLOGY

## 2018-07-07 RX ORDER — ECHINACEA PURPUREA EXTRACT 125 MG
2 TABLET ORAL AS NEEDED
Status: DISCONTINUED | OUTPATIENT
Start: 2018-07-07 | End: 2018-07-10 | Stop reason: HOSPADM

## 2018-07-07 RX ORDER — CITALOPRAM 20 MG/1
10 TABLET ORAL DAILY
Status: DISCONTINUED | OUTPATIENT
Start: 2018-07-07 | End: 2018-07-09

## 2018-07-07 RX ORDER — ALUMINA, MAGNESIA, AND SIMETHICONE 2400; 2400; 240 MG/30ML; MG/30ML; MG/30ML
15 SUSPENSION ORAL EVERY 6 HOURS PRN
Status: DISCONTINUED | OUTPATIENT
Start: 2018-07-07 | End: 2018-07-10 | Stop reason: HOSPADM

## 2018-07-07 RX ORDER — ZIPRASIDONE MESYLATE 20 MG/ML
10 INJECTION, POWDER, LYOPHILIZED, FOR SOLUTION INTRAMUSCULAR ONCE
Status: DISCONTINUED | OUTPATIENT
Start: 2018-07-07 | End: 2018-07-07 | Stop reason: HOSPADM

## 2018-07-07 RX ORDER — IBUPROFEN 600 MG/1
600 TABLET ORAL EVERY 6 HOURS PRN
Status: DISCONTINUED | OUTPATIENT
Start: 2018-07-07 | End: 2018-07-10 | Stop reason: HOSPADM

## 2018-07-07 RX ORDER — LOPERAMIDE HYDROCHLORIDE 2 MG/1
2 CAPSULE ORAL 4 TIMES DAILY PRN
Status: DISCONTINUED | OUTPATIENT
Start: 2018-07-07 | End: 2018-07-10 | Stop reason: HOSPADM

## 2018-07-07 RX ORDER — TRAZODONE HYDROCHLORIDE 50 MG/1
50 TABLET ORAL NIGHTLY PRN
Status: DISCONTINUED | OUTPATIENT
Start: 2018-07-07 | End: 2018-07-07

## 2018-07-07 RX ORDER — OLANZAPINE 5 MG/1
5 TABLET ORAL 2 TIMES DAILY
Status: CANCELLED | OUTPATIENT
Start: 2018-07-07

## 2018-07-07 RX ORDER — ONDANSETRON 4 MG/1
4 TABLET, FILM COATED ORAL EVERY 6 HOURS PRN
Status: DISCONTINUED | OUTPATIENT
Start: 2018-07-07 | End: 2018-07-10 | Stop reason: HOSPADM

## 2018-07-07 RX ORDER — OLANZAPINE 5 MG/1
5 TABLET ORAL 2 TIMES DAILY
Status: DISCONTINUED | OUTPATIENT
Start: 2018-07-07 | End: 2018-07-07

## 2018-07-07 RX ORDER — WATER 1000 ML/1000ML
INJECTION, SOLUTION INTRAVENOUS
Status: COMPLETED
Start: 2018-07-07 | End: 2018-07-07

## 2018-07-07 RX ORDER — HYDROXYZINE 50 MG/1
50 TABLET, FILM COATED ORAL EVERY 6 HOURS PRN
Status: DISCONTINUED | OUTPATIENT
Start: 2018-07-07 | End: 2018-07-10 | Stop reason: HOSPADM

## 2018-07-07 RX ORDER — BENZONATATE 100 MG/1
100 CAPSULE ORAL 3 TIMES DAILY PRN
Status: DISCONTINUED | OUTPATIENT
Start: 2018-07-07 | End: 2018-07-10 | Stop reason: HOSPADM

## 2018-07-07 RX ORDER — OLANZAPINE 5 MG/1
5 TABLET ORAL 2 TIMES DAILY
Status: DISCONTINUED | OUTPATIENT
Start: 2018-07-07 | End: 2018-07-10 | Stop reason: HOSPADM

## 2018-07-07 RX ORDER — ZIPRASIDONE MESYLATE 20 MG/ML
INJECTION, POWDER, LYOPHILIZED, FOR SOLUTION INTRAMUSCULAR
Status: COMPLETED
Start: 2018-07-07 | End: 2018-07-07

## 2018-07-07 RX ORDER — BENZTROPINE MESYLATE 1 MG/ML
0.5 INJECTION INTRAMUSCULAR; INTRAVENOUS DAILY PRN
Status: DISCONTINUED | OUTPATIENT
Start: 2018-07-07 | End: 2018-07-10 | Stop reason: HOSPADM

## 2018-07-07 RX ORDER — NICOTINE 21 MG/24HR
1 PATCH, TRANSDERMAL 24 HOURS TRANSDERMAL EVERY 24 HOURS
Status: DISCONTINUED | OUTPATIENT
Start: 2018-07-07 | End: 2018-07-10 | Stop reason: HOSPADM

## 2018-07-07 RX ORDER — FAMOTIDINE 20 MG/1
20 TABLET, FILM COATED ORAL 2 TIMES DAILY PRN
Status: DISCONTINUED | OUTPATIENT
Start: 2018-07-07 | End: 2018-07-10 | Stop reason: HOSPADM

## 2018-07-07 RX ORDER — BENZTROPINE MESYLATE 1 MG/1
1 TABLET ORAL DAILY PRN
Status: DISCONTINUED | OUTPATIENT
Start: 2018-07-07 | End: 2018-07-10 | Stop reason: HOSPADM

## 2018-07-07 RX ORDER — TRAZODONE HYDROCHLORIDE 50 MG/1
50 TABLET ORAL NIGHTLY
Status: DISCONTINUED | OUTPATIENT
Start: 2018-07-07 | End: 2018-07-10 | Stop reason: HOSPADM

## 2018-07-07 RX ADMIN — CITALOPRAM HYDROBROMIDE 10 MG: 20 TABLET ORAL at 15:39

## 2018-07-07 RX ADMIN — IBUPROFEN 600 MG: 600 TABLET ORAL at 09:12

## 2018-07-07 RX ADMIN — OLANZAPINE 5 MG: 5 TABLET, FILM COATED ORAL at 20:55

## 2018-07-07 RX ADMIN — TRAZODONE HYDROCHLORIDE 50 MG: 50 TABLET ORAL at 20:55

## 2018-07-07 RX ADMIN — WATER 0.6 ML: 1 INJECTION INTRAMUSCULAR; INTRAVENOUS; SUBCUTANEOUS at 01:19

## 2018-07-07 RX ADMIN — OLANZAPINE 5 MG: 5 TABLET, FILM COATED ORAL at 11:17

## 2018-07-07 RX ADMIN — ZIPRASIDONE MESYLATE 10 MG: 20 INJECTION, POWDER, LYOPHILIZED, FOR SOLUTION INTRAMUSCULAR at 01:18

## 2018-07-07 RX ADMIN — HYDROXYZINE HYDROCHLORIDE 50 MG: 50 TABLET ORAL at 09:12

## 2018-07-08 PROCEDURE — 99232 SBSQ HOSP IP/OBS MODERATE 35: CPT | Performed by: PSYCHIATRY & NEUROLOGY

## 2018-07-08 RX ADMIN — TRAZODONE HYDROCHLORIDE 50 MG: 50 TABLET ORAL at 20:45

## 2018-07-08 RX ADMIN — OLANZAPINE 5 MG: 5 TABLET, FILM COATED ORAL at 20:45

## 2018-07-08 RX ADMIN — CITALOPRAM HYDROBROMIDE 10 MG: 20 TABLET ORAL at 09:41

## 2018-07-08 RX ADMIN — OLANZAPINE 5 MG: 5 TABLET, FILM COATED ORAL at 09:41

## 2018-07-09 PROBLEM — F12.10 TETRAHYDROCANNABINOL (THC) USE DISORDER, MILD, ABUSE: Status: ACTIVE | Noted: 2018-07-09

## 2018-07-09 PROCEDURE — 99232 SBSQ HOSP IP/OBS MODERATE 35: CPT | Performed by: PSYCHIATRY & NEUROLOGY

## 2018-07-09 RX ADMIN — OLANZAPINE 5 MG: 5 TABLET, FILM COATED ORAL at 20:49

## 2018-07-09 RX ADMIN — TRAZODONE HYDROCHLORIDE 50 MG: 50 TABLET ORAL at 20:49

## 2018-07-09 RX ADMIN — OLANZAPINE 5 MG: 5 TABLET, FILM COATED ORAL at 09:43

## 2018-07-09 RX ADMIN — CITALOPRAM HYDROBROMIDE 10 MG: 20 TABLET ORAL at 09:43

## 2018-07-10 VITALS
RESPIRATION RATE: 18 BRPM | SYSTOLIC BLOOD PRESSURE: 116 MMHG | OXYGEN SATURATION: 97 % | WEIGHT: 155.8 LBS | DIASTOLIC BLOOD PRESSURE: 78 MMHG | HEIGHT: 68 IN | BODY MASS INDEX: 23.61 KG/M2 | TEMPERATURE: 97.5 F | HEART RATE: 114 BPM

## 2018-07-10 PROCEDURE — 99239 HOSP IP/OBS DSCHRG MGMT >30: CPT | Performed by: PSYCHIATRY & NEUROLOGY

## 2018-07-10 RX ORDER — OLANZAPINE 5 MG/1
5 TABLET ORAL 2 TIMES DAILY
Qty: 30 TABLET | Refills: 0 | Status: SHIPPED | OUTPATIENT
Start: 2018-07-10 | End: 2018-07-10

## 2018-07-10 RX ORDER — OLANZAPINE 5 MG/1
5 TABLET ORAL NIGHTLY
Qty: 30 TABLET | Refills: 0 | Status: ON HOLD | OUTPATIENT
Start: 2018-07-10 | End: 2019-08-24

## 2018-07-10 RX ADMIN — NICOTINE 1 PATCH: 21 PATCH TRANSDERMAL at 08:14

## 2018-07-10 RX ADMIN — OLANZAPINE 5 MG: 5 TABLET, FILM COATED ORAL at 08:14

## 2019-08-24 ENCOUNTER — HOSPITAL ENCOUNTER (EMERGENCY)
Facility: HOSPITAL | Age: 34
Discharge: PSYCHIATRIC OR SUBSTANCE USE FACILITY | End: 2019-08-24
Attending: EMERGENCY MEDICINE | Admitting: EMERGENCY MEDICINE

## 2019-08-24 ENCOUNTER — HOSPITAL ENCOUNTER (INPATIENT)
Facility: HOSPITAL | Age: 34
LOS: 4 days | Discharge: HOME | End: 2019-08-28
Attending: PSYCHIATRY & NEUROLOGY | Admitting: PSYCHIATRY & NEUROLOGY

## 2019-08-24 VITALS
TEMPERATURE: 97.6 F | BODY MASS INDEX: 22.73 KG/M2 | HEART RATE: 88 BPM | SYSTOLIC BLOOD PRESSURE: 118 MMHG | OXYGEN SATURATION: 94 % | HEIGHT: 68 IN | WEIGHT: 150 LBS | RESPIRATION RATE: 18 BRPM | DIASTOLIC BLOOD PRESSURE: 76 MMHG

## 2019-08-24 DIAGNOSIS — F20.0 PARANOID SCHIZOPHRENIA (HCC): Primary | ICD-10-CM

## 2019-08-24 PROBLEM — F29 PSYCHOSIS (HCC): Status: ACTIVE | Noted: 2019-08-24

## 2019-08-24 LAB
6-ACETYL MORPHINE: NEGATIVE
ALBUMIN SERPL-MCNC: 4.34 G/DL (ref 3.5–5.2)
ALBUMIN/GLOB SERPL: 1.4 G/DL
ALP SERPL-CCNC: 76 U/L (ref 39–117)
ALT SERPL W P-5'-P-CCNC: 62 U/L (ref 1–41)
AMPHET+METHAMPHET UR QL: NEGATIVE
ANION GAP SERPL CALCULATED.3IONS-SCNC: 10.3 MMOL/L (ref 5–15)
AST SERPL-CCNC: 44 U/L (ref 1–40)
BARBITURATES UR QL SCN: NEGATIVE
BASOPHILS # BLD AUTO: 0.03 10*3/MM3 (ref 0–0.2)
BASOPHILS NFR BLD AUTO: 0.3 % (ref 0–1.5)
BENZODIAZ UR QL SCN: NEGATIVE
BILIRUB SERPL-MCNC: 0.2 MG/DL (ref 0.2–1.2)
BILIRUB UR QL STRIP: NEGATIVE
BUN BLD-MCNC: 14 MG/DL (ref 6–20)
BUN/CREAT SERPL: 16.1 (ref 7–25)
BUPRENORPHINE SERPL-MCNC: NEGATIVE NG/ML
CALCIUM SPEC-SCNC: 8.9 MG/DL (ref 8.6–10.5)
CANNABINOIDS SERPL QL: POSITIVE
CHLORIDE SERPL-SCNC: 106 MMOL/L (ref 98–107)
CLARITY UR: CLEAR
CO2 SERPL-SCNC: 23.7 MMOL/L (ref 22–29)
COCAINE UR QL: NEGATIVE
COLOR UR: YELLOW
CREAT BLD-MCNC: 0.87 MG/DL (ref 0.76–1.27)
DEPRECATED RDW RBC AUTO: 45.6 FL (ref 37–54)
EOSINOPHIL # BLD AUTO: 0.13 10*3/MM3 (ref 0–0.4)
EOSINOPHIL NFR BLD AUTO: 1.2 % (ref 0.3–6.2)
ERYTHROCYTE [DISTWIDTH] IN BLOOD BY AUTOMATED COUNT: 13.5 % (ref 12.3–15.4)
ETHANOL BLD-MCNC: <10 MG/DL (ref 0–10)
ETHANOL UR QL: <0.01 %
GFR SERPL CREATININE-BSD FRML MDRD: 102 ML/MIN/1.73
GLOBULIN UR ELPH-MCNC: 3.1 GM/DL
GLUCOSE BLD-MCNC: 96 MG/DL (ref 65–99)
GLUCOSE UR STRIP-MCNC: NEGATIVE MG/DL
HCT VFR BLD AUTO: 45.8 % (ref 37.5–51)
HGB BLD-MCNC: 14.9 G/DL (ref 13–17.7)
HGB UR QL STRIP.AUTO: NEGATIVE
HIV1+2 AB SER QL: NORMAL
IMM GRANULOCYTES # BLD AUTO: 0.01 10*3/MM3 (ref 0–0.05)
IMM GRANULOCYTES NFR BLD AUTO: 0.1 % (ref 0–0.5)
KETONES UR QL STRIP: NEGATIVE
LEUKOCYTE ESTERASE UR QL STRIP.AUTO: NEGATIVE
LYMPHOCYTES # BLD AUTO: 2.69 10*3/MM3 (ref 0.7–3.1)
LYMPHOCYTES NFR BLD AUTO: 25.1 % (ref 19.6–45.3)
MAGNESIUM SERPL-MCNC: 2.1 MG/DL (ref 1.6–2.6)
MCH RBC QN AUTO: 31 PG (ref 26.6–33)
MCHC RBC AUTO-ENTMCNC: 32.5 G/DL (ref 31.5–35.7)
MCV RBC AUTO: 95.2 FL (ref 79–97)
METHADONE UR QL SCN: NEGATIVE
MONOCYTES # BLD AUTO: 0.77 10*3/MM3 (ref 0.1–0.9)
MONOCYTES NFR BLD AUTO: 7.2 % (ref 5–12)
NEUTROPHILS # BLD AUTO: 7.09 10*3/MM3 (ref 1.7–7)
NEUTROPHILS NFR BLD AUTO: 66.1 % (ref 42.7–76)
NITRITE UR QL STRIP: NEGATIVE
OPIATES UR QL: NEGATIVE
OXYCODONE UR QL SCN: NEGATIVE
PCP UR QL SCN: NEGATIVE
PH UR STRIP.AUTO: <=5 [PH] (ref 5–8)
PLATELET # BLD AUTO: 232 10*3/MM3 (ref 140–450)
PMV BLD AUTO: 11.5 FL (ref 6–12)
POTASSIUM BLD-SCNC: 4.1 MMOL/L (ref 3.5–5.2)
PROT SERPL-MCNC: 7.4 G/DL (ref 6–8.5)
PROT UR QL STRIP: NEGATIVE
RBC # BLD AUTO: 4.81 10*6/MM3 (ref 4.14–5.8)
SODIUM BLD-SCNC: 140 MMOL/L (ref 136–145)
SP GR UR STRIP: 1.02 (ref 1–1.03)
UROBILINOGEN UR QL STRIP: NORMAL
WBC NRBC COR # BLD: 10.72 10*3/MM3 (ref 3.4–10.8)

## 2019-08-24 PROCEDURE — 93005 ELECTROCARDIOGRAM TRACING: CPT | Performed by: PSYCHIATRY & NEUROLOGY

## 2019-08-24 PROCEDURE — 80307 DRUG TEST PRSMV CHEM ANLYZR: CPT | Performed by: EMERGENCY MEDICINE

## 2019-08-24 PROCEDURE — 85025 COMPLETE CBC W/AUTO DIFF WBC: CPT | Performed by: EMERGENCY MEDICINE

## 2019-08-24 PROCEDURE — 80053 COMPREHEN METABOLIC PANEL: CPT | Performed by: EMERGENCY MEDICINE

## 2019-08-24 PROCEDURE — 83735 ASSAY OF MAGNESIUM: CPT | Performed by: EMERGENCY MEDICINE

## 2019-08-24 PROCEDURE — G0432 EIA HIV-1/HIV-2 SCREEN: HCPCS | Performed by: PSYCHIATRY & NEUROLOGY

## 2019-08-24 PROCEDURE — 99285 EMERGENCY DEPT VISIT HI MDM: CPT

## 2019-08-24 PROCEDURE — 93010 ELECTROCARDIOGRAM REPORT: CPT | Performed by: INTERNAL MEDICINE

## 2019-08-24 PROCEDURE — 81003 URINALYSIS AUTO W/O SCOPE: CPT | Performed by: EMERGENCY MEDICINE

## 2019-08-24 RX ORDER — TRAZODONE HYDROCHLORIDE 50 MG/1
50 TABLET ORAL NIGHTLY PRN
Status: DISCONTINUED | OUTPATIENT
Start: 2019-08-24 | End: 2019-08-28 | Stop reason: HOSPADM

## 2019-08-24 RX ORDER — IBUPROFEN 400 MG/1
400 TABLET ORAL EVERY 6 HOURS PRN
Status: DISCONTINUED | OUTPATIENT
Start: 2019-08-24 | End: 2019-08-28 | Stop reason: HOSPADM

## 2019-08-24 RX ORDER — FAMOTIDINE 20 MG/1
20 TABLET, FILM COATED ORAL 2 TIMES DAILY PRN
Status: DISCONTINUED | OUTPATIENT
Start: 2019-08-24 | End: 2019-08-28 | Stop reason: HOSPADM

## 2019-08-24 RX ORDER — HYDROXYZINE 50 MG/1
50 TABLET, FILM COATED ORAL EVERY 6 HOURS PRN
Status: DISCONTINUED | OUTPATIENT
Start: 2019-08-24 | End: 2019-08-28 | Stop reason: HOSPADM

## 2019-08-24 RX ORDER — ONDANSETRON 4 MG/1
4 TABLET, FILM COATED ORAL EVERY 6 HOURS PRN
Status: DISCONTINUED | OUTPATIENT
Start: 2019-08-24 | End: 2019-08-28 | Stop reason: HOSPADM

## 2019-08-24 RX ORDER — LOPERAMIDE HYDROCHLORIDE 2 MG/1
2 CAPSULE ORAL
Status: DISCONTINUED | OUTPATIENT
Start: 2019-08-24 | End: 2019-08-28 | Stop reason: HOSPADM

## 2019-08-24 RX ORDER — ECHINACEA PURPUREA EXTRACT 125 MG
2 TABLET ORAL AS NEEDED
Status: DISCONTINUED | OUTPATIENT
Start: 2019-08-24 | End: 2019-08-28 | Stop reason: HOSPADM

## 2019-08-24 RX ORDER — BENZTROPINE MESYLATE 1 MG/ML
1 INJECTION INTRAMUSCULAR; INTRAVENOUS ONCE AS NEEDED
Status: DISCONTINUED | OUTPATIENT
Start: 2019-08-24 | End: 2019-08-28 | Stop reason: HOSPADM

## 2019-08-24 RX ORDER — ALUMINA, MAGNESIA, AND SIMETHICONE 2400; 2400; 240 MG/30ML; MG/30ML; MG/30ML
15 SUSPENSION ORAL EVERY 6 HOURS PRN
Status: DISCONTINUED | OUTPATIENT
Start: 2019-08-24 | End: 2019-08-28 | Stop reason: HOSPADM

## 2019-08-24 RX ORDER — BENZTROPINE MESYLATE 1 MG/1
2 TABLET ORAL ONCE AS NEEDED
Status: DISCONTINUED | OUTPATIENT
Start: 2019-08-24 | End: 2019-08-28 | Stop reason: HOSPADM

## 2019-08-24 RX ORDER — BENZONATATE 100 MG/1
100 CAPSULE ORAL 3 TIMES DAILY PRN
Status: DISCONTINUED | OUTPATIENT
Start: 2019-08-24 | End: 2019-08-28 | Stop reason: HOSPADM

## 2019-08-25 PROCEDURE — 99223 1ST HOSP IP/OBS HIGH 75: CPT | Performed by: PSYCHIATRY & NEUROLOGY

## 2019-08-25 RX ORDER — OLANZAPINE 5 MG/1
5 TABLET ORAL NIGHTLY
Status: DISCONTINUED | OUTPATIENT
Start: 2019-08-25 | End: 2019-08-27

## 2019-08-25 RX ORDER — CLONIDINE HYDROCHLORIDE 0.1 MG/1
0.1 TABLET ORAL ONCE
Status: COMPLETED | OUTPATIENT
Start: 2019-08-25 | End: 2019-08-25

## 2019-08-25 RX ADMIN — OLANZAPINE 5 MG: 5 TABLET, FILM COATED ORAL at 20:06

## 2019-08-25 RX ADMIN — TRAZODONE HYDROCHLORIDE 50 MG: 50 TABLET ORAL at 20:08

## 2019-08-25 RX ADMIN — CLONIDINE HYDROCHLORIDE 0.1 MG: 0.1 TABLET ORAL at 08:58

## 2019-08-26 PROCEDURE — 99232 SBSQ HOSP IP/OBS MODERATE 35: CPT | Performed by: PSYCHIATRY & NEUROLOGY

## 2019-08-26 RX ORDER — OLANZAPINE 5 MG/1
5 TABLET ORAL 3 TIMES DAILY PRN
Status: CANCELLED | OUTPATIENT
Start: 2019-08-26

## 2019-08-26 RX ORDER — ARIPIPRAZOLE 10 MG/1
5 TABLET ORAL DAILY
Status: DISCONTINUED | OUTPATIENT
Start: 2019-08-26 | End: 2019-08-28 | Stop reason: HOSPADM

## 2019-08-26 RX ORDER — CLONIDINE HYDROCHLORIDE 0.1 MG/1
0.1 TABLET ORAL ONCE
Status: COMPLETED | OUTPATIENT
Start: 2019-08-26 | End: 2019-08-26

## 2019-08-26 RX ADMIN — ARIPIPRAZOLE 5 MG: 10 TABLET ORAL at 10:15

## 2019-08-26 RX ADMIN — OLANZAPINE 5 MG: 5 TABLET, FILM COATED ORAL at 20:03

## 2019-08-26 RX ADMIN — CLONIDINE HYDROCHLORIDE 0.1 MG: 0.1 TABLET ORAL at 13:55

## 2019-08-26 RX ADMIN — TRAZODONE HYDROCHLORIDE 50 MG: 50 TABLET ORAL at 20:03

## 2019-08-27 LAB
HAV IGM SERPL QL IA: ABNORMAL
HBV CORE IGM SERPL QL IA: ABNORMAL
HBV SURFACE AG SERPL QL IA: ABNORMAL
HCV AB SER DONR QL: REACTIVE
T4 FREE SERPL-MCNC: 0.76 NG/DL (ref 0.93–1.7)
TSH SERPL DL<=0.05 MIU/L-ACNC: 0.75 MIU/ML (ref 0.27–4.2)

## 2019-08-27 PROCEDURE — 84439 ASSAY OF FREE THYROXINE: CPT | Performed by: PSYCHIATRY & NEUROLOGY

## 2019-08-27 PROCEDURE — 80074 ACUTE HEPATITIS PANEL: CPT | Performed by: PSYCHIATRY & NEUROLOGY

## 2019-08-27 PROCEDURE — 99232 SBSQ HOSP IP/OBS MODERATE 35: CPT | Performed by: PSYCHIATRY & NEUROLOGY

## 2019-08-27 PROCEDURE — 93010 ELECTROCARDIOGRAM REPORT: CPT | Performed by: INTERNAL MEDICINE

## 2019-08-27 PROCEDURE — 93005 ELECTROCARDIOGRAM TRACING: CPT | Performed by: PSYCHIATRY & NEUROLOGY

## 2019-08-27 PROCEDURE — 84443 ASSAY THYROID STIM HORMONE: CPT | Performed by: PSYCHIATRY & NEUROLOGY

## 2019-08-27 RX ORDER — ATENOLOL 25 MG/1
25 TABLET ORAL EVERY 12 HOURS SCHEDULED
Status: DISCONTINUED | OUTPATIENT
Start: 2019-08-27 | End: 2019-08-28 | Stop reason: HOSPADM

## 2019-08-27 RX ORDER — OLANZAPINE 5 MG/1
5 TABLET, ORALLY DISINTEGRATING ORAL 3 TIMES DAILY PRN
Status: DISCONTINUED | OUTPATIENT
Start: 2019-08-27 | End: 2019-08-28 | Stop reason: HOSPADM

## 2019-08-27 RX ADMIN — ATENOLOL 25 MG: 25 TABLET ORAL at 09:52

## 2019-08-27 RX ADMIN — ATENOLOL 25 MG: 25 TABLET ORAL at 21:02

## 2019-08-27 RX ADMIN — TRAZODONE HYDROCHLORIDE 50 MG: 50 TABLET ORAL at 21:02

## 2019-08-27 RX ADMIN — ARIPIPRAZOLE 5 MG: 10 TABLET ORAL at 08:18

## 2019-08-28 VITALS
RESPIRATION RATE: 18 BRPM | SYSTOLIC BLOOD PRESSURE: 125 MMHG | TEMPERATURE: 97.7 F | HEART RATE: 82 BPM | BODY MASS INDEX: 27.58 KG/M2 | WEIGHT: 182 LBS | DIASTOLIC BLOOD PRESSURE: 81 MMHG | OXYGEN SATURATION: 97 % | HEIGHT: 68 IN

## 2019-08-28 PROBLEM — F15.159: Status: ACTIVE | Noted: 2019-08-28

## 2019-08-28 PROCEDURE — 99239 HOSP IP/OBS DSCHRG MGMT >30: CPT | Performed by: PSYCHIATRY & NEUROLOGY

## 2019-08-28 RX ORDER — ARIPIPRAZOLE 5 MG/1
5 TABLET ORAL DAILY
Qty: 30 TABLET | Refills: 0 | Status: SHIPPED | OUTPATIENT
Start: 2019-08-28 | End: 2019-09-05 | Stop reason: HOSPADM

## 2019-08-28 RX ORDER — ATENOLOL 25 MG/1
25 TABLET ORAL EVERY 12 HOURS SCHEDULED
Qty: 60 TABLET | Refills: 0 | Status: ON HOLD | OUTPATIENT
Start: 2019-08-28 | End: 2020-02-10

## 2019-08-28 RX ADMIN — ATENOLOL 25 MG: 25 TABLET ORAL at 08:14

## 2019-08-28 RX ADMIN — ARIPIPRAZOLE 5 MG: 10 TABLET ORAL at 08:14

## 2019-09-02 ENCOUNTER — HOSPITAL ENCOUNTER (EMERGENCY)
Facility: HOSPITAL | Age: 34
Discharge: PSYCHIATRIC OR SUBSTANCE USE FACILITY | End: 2019-09-02
Attending: FAMILY MEDICINE | Admitting: FAMILY MEDICINE

## 2019-09-02 ENCOUNTER — HOSPITAL ENCOUNTER (INPATIENT)
Facility: HOSPITAL | Age: 34
LOS: 3 days | Discharge: HOME | End: 2019-09-05
Attending: PSYCHIATRY & NEUROLOGY | Admitting: PSYCHIATRY & NEUROLOGY

## 2019-09-02 VITALS
OXYGEN SATURATION: 95 % | TEMPERATURE: 97 F | BODY MASS INDEX: 22.73 KG/M2 | DIASTOLIC BLOOD PRESSURE: 72 MMHG | HEART RATE: 90 BPM | WEIGHT: 150 LBS | RESPIRATION RATE: 18 BRPM | SYSTOLIC BLOOD PRESSURE: 119 MMHG | HEIGHT: 68 IN

## 2019-09-02 DIAGNOSIS — F29 PSYCHOSIS, UNSPECIFIED PSYCHOSIS TYPE (HCC): Primary | ICD-10-CM

## 2019-09-02 PROBLEM — F32.9 MDD (MAJOR DEPRESSIVE DISORDER): Status: ACTIVE | Noted: 2019-09-02

## 2019-09-02 LAB
6-ACETYL MORPHINE: NEGATIVE
ALBUMIN SERPL-MCNC: 4.36 G/DL (ref 3.5–5.2)
ALBUMIN/GLOB SERPL: 1.4 G/DL
ALP SERPL-CCNC: 79 U/L (ref 39–117)
ALT SERPL W P-5'-P-CCNC: 71 U/L (ref 1–41)
AMPHET+METHAMPHET UR QL: POSITIVE
ANION GAP SERPL CALCULATED.3IONS-SCNC: 10.6 MMOL/L (ref 5–15)
AST SERPL-CCNC: 36 U/L (ref 1–40)
BARBITURATES UR QL SCN: NEGATIVE
BASOPHILS # BLD AUTO: 0.02 10*3/MM3 (ref 0–0.2)
BASOPHILS NFR BLD AUTO: 0.2 % (ref 0–1.5)
BENZODIAZ UR QL SCN: NEGATIVE
BILIRUB SERPL-MCNC: 0.2 MG/DL (ref 0.2–1.2)
BILIRUB UR QL STRIP: NEGATIVE
BUN BLD-MCNC: 15 MG/DL (ref 6–20)
BUN/CREAT SERPL: 19.7 (ref 7–25)
BUPRENORPHINE SERPL-MCNC: NEGATIVE NG/ML
CALCIUM SPEC-SCNC: 9.3 MG/DL (ref 8.6–10.5)
CANNABINOIDS SERPL QL: NEGATIVE
CHLORIDE SERPL-SCNC: 102 MMOL/L (ref 98–107)
CLARITY UR: CLEAR
CO2 SERPL-SCNC: 25.4 MMOL/L (ref 22–29)
COCAINE UR QL: NEGATIVE
COLOR UR: YELLOW
CREAT BLD-MCNC: 0.76 MG/DL (ref 0.76–1.27)
DEPRECATED RDW RBC AUTO: 44.8 FL (ref 37–54)
EOSINOPHIL # BLD AUTO: 0.07 10*3/MM3 (ref 0–0.4)
EOSINOPHIL NFR BLD AUTO: 0.5 % (ref 0.3–6.2)
ERYTHROCYTE [DISTWIDTH] IN BLOOD BY AUTOMATED COUNT: 13.3 % (ref 12.3–15.4)
ETHANOL BLD-MCNC: <10 MG/DL (ref 0–10)
ETHANOL UR QL: <0.01 %
GFR SERPL CREATININE-BSD FRML MDRD: 119 ML/MIN/1.73
GLOBULIN UR ELPH-MCNC: 3.1 GM/DL
GLUCOSE BLD-MCNC: 140 MG/DL (ref 65–99)
GLUCOSE UR STRIP-MCNC: NEGATIVE MG/DL
HCT VFR BLD AUTO: 45.7 % (ref 37.5–51)
HGB BLD-MCNC: 14.9 G/DL (ref 13–17.7)
HGB UR QL STRIP.AUTO: NEGATIVE
IMM GRANULOCYTES # BLD AUTO: 0.03 10*3/MM3 (ref 0–0.05)
IMM GRANULOCYTES NFR BLD AUTO: 0.2 % (ref 0–0.5)
KETONES UR QL STRIP: NEGATIVE
LEUKOCYTE ESTERASE UR QL STRIP.AUTO: NEGATIVE
LYMPHOCYTES # BLD AUTO: 3.02 10*3/MM3 (ref 0.7–3.1)
LYMPHOCYTES NFR BLD AUTO: 23.7 % (ref 19.6–45.3)
MCH RBC QN AUTO: 31.1 PG (ref 26.6–33)
MCHC RBC AUTO-ENTMCNC: 32.6 G/DL (ref 31.5–35.7)
MCV RBC AUTO: 95.4 FL (ref 79–97)
METHADONE UR QL SCN: NEGATIVE
MONOCYTES # BLD AUTO: 0.61 10*3/MM3 (ref 0.1–0.9)
MONOCYTES NFR BLD AUTO: 4.8 % (ref 5–12)
NEUTROPHILS # BLD AUTO: 8.98 10*3/MM3 (ref 1.7–7)
NEUTROPHILS NFR BLD AUTO: 70.6 % (ref 42.7–76)
NITRITE UR QL STRIP: NEGATIVE
OPIATES UR QL: NEGATIVE
OXYCODONE UR QL SCN: NEGATIVE
PCP UR QL SCN: NEGATIVE
PH UR STRIP.AUTO: 5.5 [PH] (ref 5–8)
PLATELET # BLD AUTO: 224 10*3/MM3 (ref 140–450)
PMV BLD AUTO: 11.1 FL (ref 6–12)
POTASSIUM BLD-SCNC: 3.9 MMOL/L (ref 3.5–5.2)
PROT SERPL-MCNC: 7.5 G/DL (ref 6–8.5)
PROT UR QL STRIP: NEGATIVE
RBC # BLD AUTO: 4.79 10*6/MM3 (ref 4.14–5.8)
SODIUM BLD-SCNC: 138 MMOL/L (ref 136–145)
SP GR UR STRIP: 1.03 (ref 1–1.03)
UROBILINOGEN UR QL STRIP: NORMAL
WBC NRBC COR # BLD: 12.73 10*3/MM3 (ref 3.4–10.8)

## 2019-09-02 PROCEDURE — 80307 DRUG TEST PRSMV CHEM ANLYZR: CPT | Performed by: PHYSICIAN ASSISTANT

## 2019-09-02 PROCEDURE — 93005 ELECTROCARDIOGRAM TRACING: CPT | Performed by: PSYCHIATRY & NEUROLOGY

## 2019-09-02 PROCEDURE — 99284 EMERGENCY DEPT VISIT MOD MDM: CPT

## 2019-09-02 PROCEDURE — 93010 ELECTROCARDIOGRAM REPORT: CPT | Performed by: INTERNAL MEDICINE

## 2019-09-02 PROCEDURE — 99223 1ST HOSP IP/OBS HIGH 75: CPT | Performed by: PSYCHIATRY & NEUROLOGY

## 2019-09-02 PROCEDURE — 85025 COMPLETE CBC W/AUTO DIFF WBC: CPT | Performed by: PHYSICIAN ASSISTANT

## 2019-09-02 PROCEDURE — 80053 COMPREHEN METABOLIC PANEL: CPT | Performed by: PHYSICIAN ASSISTANT

## 2019-09-02 PROCEDURE — 81003 URINALYSIS AUTO W/O SCOPE: CPT | Performed by: PHYSICIAN ASSISTANT

## 2019-09-02 RX ORDER — TRAZODONE HYDROCHLORIDE 50 MG/1
50 TABLET ORAL NIGHTLY PRN
Status: DISCONTINUED | OUTPATIENT
Start: 2019-09-02 | End: 2019-09-05 | Stop reason: HOSPADM

## 2019-09-02 RX ORDER — LOPERAMIDE HYDROCHLORIDE 2 MG/1
2 CAPSULE ORAL
Status: DISCONTINUED | OUTPATIENT
Start: 2019-09-02 | End: 2019-09-05 | Stop reason: HOSPADM

## 2019-09-02 RX ORDER — IBUPROFEN 400 MG/1
400 TABLET ORAL EVERY 6 HOURS PRN
Status: DISCONTINUED | OUTPATIENT
Start: 2019-09-02 | End: 2019-09-05 | Stop reason: HOSPADM

## 2019-09-02 RX ORDER — NICOTINE 21 MG/24HR
1 PATCH, TRANSDERMAL 24 HOURS TRANSDERMAL
Status: DISCONTINUED | OUTPATIENT
Start: 2019-09-02 | End: 2019-09-05 | Stop reason: HOSPADM

## 2019-09-02 RX ORDER — ONDANSETRON 4 MG/1
4 TABLET, FILM COATED ORAL EVERY 6 HOURS PRN
Status: DISCONTINUED | OUTPATIENT
Start: 2019-09-02 | End: 2019-09-05 | Stop reason: HOSPADM

## 2019-09-02 RX ORDER — ECHINACEA PURPUREA EXTRACT 125 MG
2 TABLET ORAL AS NEEDED
Status: DISCONTINUED | OUTPATIENT
Start: 2019-09-02 | End: 2019-09-05 | Stop reason: HOSPADM

## 2019-09-02 RX ORDER — ACETAMINOPHEN 325 MG/1
650 TABLET ORAL EVERY 6 HOURS PRN
Status: DISCONTINUED | OUTPATIENT
Start: 2019-09-02 | End: 2019-09-05 | Stop reason: HOSPADM

## 2019-09-02 RX ORDER — ARIPIPRAZOLE 10 MG/1
5 TABLET ORAL DAILY
Status: DISCONTINUED | OUTPATIENT
Start: 2019-09-02 | End: 2019-09-03

## 2019-09-02 RX ORDER — FAMOTIDINE 20 MG/1
20 TABLET, FILM COATED ORAL 2 TIMES DAILY PRN
Status: DISCONTINUED | OUTPATIENT
Start: 2019-09-02 | End: 2019-09-05 | Stop reason: HOSPADM

## 2019-09-02 RX ORDER — ATENOLOL 25 MG/1
25 TABLET ORAL EVERY 12 HOURS SCHEDULED
Status: DISCONTINUED | OUTPATIENT
Start: 2019-09-02 | End: 2019-09-05 | Stop reason: HOSPADM

## 2019-09-02 RX ORDER — BENZTROPINE MESYLATE 1 MG/ML
1 INJECTION INTRAMUSCULAR; INTRAVENOUS ONCE AS NEEDED
Status: DISCONTINUED | OUTPATIENT
Start: 2019-09-02 | End: 2019-09-05 | Stop reason: HOSPADM

## 2019-09-02 RX ORDER — ALUMINA, MAGNESIA, AND SIMETHICONE 2400; 2400; 240 MG/30ML; MG/30ML; MG/30ML
15 SUSPENSION ORAL EVERY 6 HOURS PRN
Status: DISCONTINUED | OUTPATIENT
Start: 2019-09-02 | End: 2019-09-05 | Stop reason: HOSPADM

## 2019-09-02 RX ORDER — BENZTROPINE MESYLATE 1 MG/1
2 TABLET ORAL ONCE AS NEEDED
Status: DISCONTINUED | OUTPATIENT
Start: 2019-09-02 | End: 2019-09-05 | Stop reason: HOSPADM

## 2019-09-02 RX ORDER — HYDROXYZINE 50 MG/1
50 TABLET, FILM COATED ORAL EVERY 6 HOURS PRN
Status: DISCONTINUED | OUTPATIENT
Start: 2019-09-02 | End: 2019-09-05 | Stop reason: HOSPADM

## 2019-09-02 RX ORDER — BENZONATATE 100 MG/1
100 CAPSULE ORAL 3 TIMES DAILY PRN
Status: DISCONTINUED | OUTPATIENT
Start: 2019-09-02 | End: 2019-09-05 | Stop reason: HOSPADM

## 2019-09-02 RX ADMIN — ATENOLOL 25 MG: 25 TABLET ORAL at 08:20

## 2019-09-02 RX ADMIN — ARIPIPRAZOLE 5 MG: 10 TABLET ORAL at 08:20

## 2019-09-02 RX ADMIN — ATENOLOL 25 MG: 25 TABLET ORAL at 20:36
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
## 2019-09-02 NOTE — NURSING NOTE
Verbal consent obtained for evaluation, treatment including any prescribed or Prn medication and admission if needed given by Aric Mims- [phone]
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
## 2019-09-02 NOTE — PLAN OF CARE
Problem: Patient Care Overview  Goal: Plan of Care Review  Outcome: Ongoing (interventions implemented as appropriate)   09/02/19 0434   Coping/Psychosocial   Plan of Care Reviewed With patient   Coping/Psychosocial   Patient Agreement with Plan of Care agrees   OTHER   Outcome Summary new patient

## 2019-09-02 NOTE — PLAN OF CARE
Problem: Patient Care Overview  Goal: Plan of Care Review  Outcome: Ongoing (interventions implemented as appropriate)   09/02/19 3597   Coping/Psychosocial   Plan of Care Reviewed With patient   Coping/Psychosocial   Patient Agreement with Plan of Care agrees   OTHER   Outcome Summary Patient denies SI, HI, or AVH. Patient appetite and sleep are good with no complaints. Patient has history of substance abuse. Patient is interacting appropriately and staff can redirect as needed. Will continue to monitor.   Plan of Care Review   Progress no change       Problem: Overarching Goals (Adult)  Goal: Adheres to Safety Considerations for Self and Others  Outcome: Ongoing (interventions implemented as appropriate)    Goal: Optimized Coping Skills in Response to Life Stressors  Outcome: Ongoing (interventions implemented as appropriate)    Goal: Develops/Participates in Therapeutic [place] to Support Successful Transition  Outcome: Ongoing (interventions implemented as appropriate)
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
## 2019-09-02 NOTE — PLAN OF CARE
"Problem: Patient Care Overview  Goal: Plan of Care Review  Outcome: Ongoing (interventions implemented as appropriate)   09/02/19 1311   Coping/Psychosocial   Plan of Care Reviewed With patient   Coping/Psychosocial   Patient Agreement with Plan of Care agrees   Coping/Psychosocial   Consent Given to Review Plan with [facility]     1220  Therapist call from Patient's father Aric this date. Aric reports Patient's disruptive behaviors within the home. Aric reports that Patient was noncompliant with medication due to \"snorting\" tablets. Aric also states that Patient broke into medications within the home that are prescribed to Aric's girlfriend and \"snorted\" Klonopin and Gapabentin. Aric states that Patient has made statements of obtaining a new payee but reports Patient only spends money on drugs and will also sell or trade clothing for \"dope\". Aric voiced understanding that Patient's medication may not be effective with street drugs but discussed difficulty of supervising Patient. Aric states that he contacts local police due to inappropriate behavior but is informed of a 202A. Aric states he is guardian of Patient but is running out of resources to help Patient due to his unwillingness to be compliant and sober. Aric reports need for [facility] placement in efforts to keep Patient safe and \"off the streets\". Therapist acknowledged but informed Aric that [facility] can not restrain Patient to reside there; Aric voiced understanding and requested referrals to be sent on his behalf in hopes to obtain [facility] placement. Aric inquired about LOS; Therapist addressed.     The following referrals have been sent on Patient's behalf:    [facility]   Phone [phone]  Fax [phone]    [place]   Phone [phone]  Fax [phone]  -No male beds    [facility]   Phone [phone]  Fax [phone]    [facility]  Phone [phone]  Fax [phone]      "

## 2019-09-02 NOTE — PLAN OF CARE
Problem: Overarching Goals (Adult)  Goal: Develops/Participates in [facility] [place] to Support Successful Transition    Intervention: Foster [facility] [place]   09/02/19 1104   Interventions   Trust Relationship/Rapport care explained;choices provided;emotional support provided;questions encouraged;questions answered;empathic listening provided;reassurance provided;thoughts/feelings acknowledged
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
## 2019-09-03 RX ORDER — ARIPIPRAZOLE 10 MG/1
10 TABLET ORAL DAILY
Status: DISCONTINUED | OUTPATIENT
Start: 2019-09-04 | End: 2019-09-05 | Stop reason: HOSPADM

## 2019-09-03 RX ADMIN — ARIPIPRAZOLE 5 MG: 10 TABLET ORAL at 08:09

## 2019-09-03 RX ADMIN — HYDROXYZINE HYDROCHLORIDE 50 MG: 50 TABLET ORAL at 11:46

## 2019-09-03 RX ADMIN — ATENOLOL 25 MG: 25 TABLET ORAL at 20:37

## 2019-09-03 RX ADMIN — ATENOLOL 25 MG: 25 TABLET ORAL at 08:07

## 2019-09-03 NOTE — PLAN OF CARE
Problem: Patient Care Overview  Goal: Plan of Care Review  Outcome: Ongoing (interventions implemented as appropriate)   09/02/19 1453 09/03/19 0141   Coping/Psychosocial   Plan of Care Reviewed With --  patient   Coping/Psychosocial   Patient Agreement with Plan of Care --  agrees   OTHER   Outcome Summary Patient denies SI, HI, or AVH. Patient appetite and sleep are good with no complaints. Patient has history of substance abuse. Patient is interacting appropriately and staff can redirect as needed. Will continue to monitor. --        Problem: Overarching Goals (Adult)  Goal: Optimized Coping Skills in Response to Life Stressors  Outcome: Ongoing (interventions implemented as appropriate)

## 2019-09-03 NOTE — PROGRESS NOTES
[address]  [facility]   Phone [phone]  Fax [phone]  -No new admits at this time per [facility]   Phone [phone]  Fax [phone]  -No male beds     [facility]   Phone [phone]  Fax [phone]  -Referral has not been reviewed at this time     [facility]  Phone [phone]  Fax [phone]  -Referral has not been reviewed at this time     Therapist sent additional referrals to the following [facility] today:    [facility]  Phone [phone]  Fax [phone]

## 2019-09-03 NOTE — PLAN OF CARE
Problem: Patient Care Overview  Goal: Plan of Care Review  Outcome: Ongoing (interventions implemented as appropriate)   09/03/19 1116   Coping/Psychosocial   Plan of Care Reviewed With guardian;father     1100  Therapist spoke to Patient's father who is also guardian Aric today. Aric was provided update regarding treatment and referral status. Therapist discussed concerns of Patient being denied [facility] and discussed [facility] as community resource; Aric acknowledged but discussed issues of keeping Patient at home and encouraged [facility] referrals to continue. Aric states that he will be leaving for [place] today and will return tomorrow. Aric reports that he does not want Patient to return home today due to lack of supervision. Aric will contact Therapist tomorrow to notify of his return.

## 2019-09-03 NOTE — PROGRESS NOTES
LOS: 1 day   Patient Care Team:  Venkata Paris MD as PCP - General (Family Medicine)    Chief Complaint: Patient says he is ready to go home.  He says he brought himself in the hospital because he was getting angry,, a day or 2 ago, he says he does not know what was making him angry but he admits that he was smoking pot and also did some meth.  But does not give any other details.  He denies SI HI hallucinations or paranoia.  He says he has slept well and is eating well.  He denies he is craving for any drugs  He remains difficult to reason with however in spite of me telling him that his therapist has talked with his father, who is his guardian and is going to be out of town until tomorrow. he says that Abilify helps him better than Zyprexa and he would like to continue.  He also declines Zyprexa because it had caused his brother to become a diabetic and he does not want to be a diabetic, he also says he will follow-up at  [facility] in [facility] and has an appointment on 9/5/2019.  He returns a few minutes later and says he would like to go to a home.  Interval History:           Vital Signs    Temp:  [97 °F (36.1 °C)-98.3 °F (36.8 °C)] 97 °F (36.1 °C)  Heart Rate:  [] 91  Resp:  [16-18] 18  BP: (110-136)/(57-83) 136/83    Lab Results:   Lab Results (last 24 hours)     ** No results found for the last 24 hours. **           Labs:     Lab Results (last 24 hours)     ** No results found for the last 24 hours. **                        Exam:  Mental Status Exam:     Hygiene:   fair  Cooperation:  Cooperative  Eye Contact:  Good  Psychomotor Behavior:  Aggitated  Affect:  Appropriate  Speech:  Normal  Thought Progress:  Goal directed  Thought Content:  Mood congruent  Suicidal:  None  Homicidal:  None  Hallucinations:  None  Delusion:  None  Memory:  Intact  Orientation:  Person, Place and Time  Reliability:  fair  Insight:  Fair  Judgement:  Impaired  Impulse Control:  Fair and Impaired  Results  "Review:    Lab Results (last 24 hours)     ** No results found for the last 24 hours. **          Medication Review:  Hospital Medications (active)       Dose Frequency Start End    acetaminophen (TYLENOL) tablet 650 mg 650 mg Every 6 Hours PRN 9/2/2019     Sig - Route: Take 2 tablets by mouth Every 6 (Six) Hours As Needed for Mild Pain  or Moderate Pain  (Severe Pain (7-10)). - Oral    Cosign for Ordering: Accepted by Carlton Lozada MD on 9/2/2019  8:08 AM    aluminum-magnesium hydroxide-simethicone (MAALOX MAX) 400-400-40 MG/5ML suspension 15 mL 15 mL Every 6 Hours PRN 9/2/2019     Sig - Route: Take 15 mL by mouth Every 6 (Six) Hours As Needed for Indigestion or Heartburn. - Oral    Cosign for Ordering: Accepted by Carlton Lozada MD on 9/2/2019  8:08 AM    ARIPiprazole (ABILIFY) tablet 5 mg 5 mg Daily 9/2/2019     Sig - Route: Take 0.5 tablets by mouth Daily. - Oral    atenolol (TENORMIN) tablet 25 mg 25 mg Every 12 Hours Scheduled 9/2/2019     Sig - Route: Take 1 tablet by mouth Every 12 (Twelve) Hours. - Oral    benzonatate (TESSALON) capsule 100 mg 100 mg 3 Times Daily PRN 9/2/2019     Sig - Route: Take 1 capsule by mouth 3 (Three) Times a Day As Needed for Cough. - Oral    Cosign for Ordering: Accepted by Carlton Lozada MD on 9/2/2019  8:08 AM    benztropine (COGENTIN) injection 1 mg 1 mg Once As Needed 9/2/2019     Sig - Route: Inject 1 mL into the appropriate muscle as directed by prescriber 1 (One) Time As Needed (Dystonia - If Unable to Take PO - Contact Provider if Additional Doses Needed). - Intramuscular    Cosign for Ordering: Accepted by Carlton Lozada MD on 9/2/2019  8:08 AM    Linked Group 1:  \"Or\" Linked Group Details        benztropine (COGENTIN) tablet 2 mg 2 mg Once As Needed 9/2/2019     Sig - Route: Take 2 tablets by mouth 1 (One) Time As Needed (Dystonia - Contact Provider if Additional Doses Needed). - Oral    Cosign for Ordering: Accepted by " "Carlton Lozada MD on 9/2/2019  8:08 AM    Linked Group 1:  \"Or\" Linked Group Details        famotidine (PEPCID) tablet 20 mg 20 mg 2 Times Daily PRN 9/2/2019     Sig - Route: Take 1 tablet by mouth 2 (Two) Times a Day As Needed for Heartburn. - Oral    Cosign for Ordering: Accepted by Carlton Lozada MD on 9/2/2019  8:08 AM    hydrOXYzine (ATARAX) tablet 50 mg 50 mg Every 6 Hours PRN 9/2/2019     Sig - Route: Take 1 tablet by mouth Every 6 (Six) Hours As Needed for Anxiety. - Oral    Cosign for Ordering: Accepted by Carlton Lozada MD on 9/2/2019  8:08 AM    ibuprofen (ADVIL,MOTRIN) tablet 400 mg 400 mg Every 6 Hours PRN 9/2/2019     Sig - Route: Take 1 tablet by mouth Every 6 (Six) Hours As Needed for Mild Pain  or Moderate Pain  (Severe Pain (7-10)). - Oral    Cosign for Ordering: Accepted by Carlton Lozada MD on 9/2/2019  8:08 AM    loperamide (IMODIUM) capsule 2 mg 2 mg Every 2 Hours PRN 9/2/2019     Sig - Route: Take 1 capsule by mouth Every 2 (Two) Hours As Needed for Diarrhea (After Each Loose Stool - Do Not Exceed 16mg in 24 Hours). - Oral    Cosign for Ordering: Accepted by Carlton Lozada MD on 9/2/2019  8:08 AM    magnesium hydroxide (MILK OF MAGNESIA) suspension 2400 mg/10mL 10 mL 10 mL Daily PRN 9/2/2019     Sig - Route: Take 10 mL by mouth Daily As Needed for Constipation. - Oral    Cosign for Ordering: Accepted by Carlton Lozada MD on 9/2/2019  8:08 AM    nicotine (NICODERM CQ) 21 MG/24HR patch 1 patch 1 patch Every 24 Hours Scheduled 9/2/2019     Sig - Route: Place 1 patch on the skin as directed by provider Daily. - Transdermal    Cosign for Ordering: Accepted by Carlton Lozada MD on 9/2/2019  8:08 AM    ondansetron (ZOFRAN) tablet 4 mg 4 mg Every 6 Hours PRN 9/2/2019     Sig - Route: Take 1 tablet by mouth Every 6 (Six) Hours As Needed for Nausea or Vomiting. - Oral    Cosign for Ordering: Accepted by Carlton Lozada " MD Dusty on 9/2/2019  8:08 AM    sodium chloride nasal spray 2 spray 2 spray As Needed 9/2/2019     Sig - Route: 2 sprays by Each Nare route As Needed for Congestion. - Each Nare    Cosign for Ordering: Accepted by Carlton Lozada MD on 9/2/2019  8:08 AM    traZODone (DESYREL) tablet 50 mg 50 mg Nightly PRN 9/2/2019     Sig - Route: Take 1 tablet by mouth At Night As Needed for Sleep. - Oral    Cosign for Ordering: Accepted by Carlton Lozada MD on 9/2/2019  8:08 AM           Special Precautions Level: III        Assessment/Plan     Assessment: Stimulant abuse with stimulant-induced psychosis  Increase Abilify to 10 mg daily.    Methamphetamine abuse  Borderline IQ and  Cannabis use disorder            I have reviewed and approved the behavioral health treatment plans and problem list. Yes        Lauren Lopez MD  09/03/19  11:44 AM
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
## 2019-09-03 NOTE — PLAN OF CARE
"Problem: Overarching Goals (Adult)  Goal: Optimized Coping Skills in Response to Life Stressors    Intervention: Promote Effective Coping Strategies   09/03/19 0879   Coping/Psychosocial Interventions   Supportive Measures active listening utilized;counseling provided;decision-making supported;goal setting facilitated;positive reinforcement provided;problem solving facilitated;self-responsibility promoted;verbalization of feelings encouraged     0809  DATA:    Patient asked to met with Therapist at this time advocating readiness to return home. Patient reports improvements toward mood is due to medication regime. Patient also reports that he would \"do better\" at home if he continues to take medication as prescribed. Patient reports the belief that his father would be accepting of him returning home. Therapist addressed. Therapist informed Patient of his father's concerns of Patient returning home. Patient inquired about a \"group home\" Therapist discussed [facility] however Patient denied the need discussing desires to receive \"comp care outpatient\". Therapist discussed Patient's noncompliance with medication and aftercare; Patient acknowledged and voiced understanding of concerns. Patient was asked if ACT has ever been involved; Patient denied. Therapist discussed this could be an effective resource for Patient and will discuss with Guardian.     CLINICAL MANUVERING/INTERVENTIONS:  Therapist utilized a person-centered approach to build rapport with patient.  Therapist implemented motivational interviewing techniques to assist Patient with exploring personal growth and change. Therapist acknowledged Patient's voiced readiness to return home and to comply with medication and aftercare appointments and praised Patient for insight and desire for change.Therapist applied cognitive behavioral strategies to facilitate identification of maladaptive patterns of thinking and behavior and discussed negative impact of addiction and " "Patient's pattern of \"snorting dope\". Therapist promoted safe nonjudgmental environment by with unconditional positive regard, active listening skills,and empathy. Therapist assisted Patient with identifying and implementing healthier coping strategies.     ASSESSMENT:   Patient seemed fixated on discharge today. Patient seemed more insightful and understanding of Father's concerns and previous behaviors within the home. Patient seemed on task and alert today. Patient asked multiple times when provider will be present today in hopes to be discharged.     PLAN:   Patient will receive 24/7 nursing monitoring and daily psychiatrist evaluation by a multidisciplinary team.    Patient will continue stabilization at this time.     Therapist completed referrals to the following:      [facility]   Phone [phone]  Fax [phone]     [facility]   Phone [phone]  Fax [phone]  -No male beds     [facility]   Phone [phone]  Fax [phone]     [facility]  Phone [phone]  Fax [phone]    Therapist to contact [facility] to check status of referral. Therapist to discuss possible ACT Team referral with Guardian if Patient is denied [facility].         "
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
## 2019-09-04 RX ADMIN — ATENOLOL 25 MG: 25 TABLET ORAL at 20:25

## 2019-09-04 RX ADMIN — HYDROXYZINE HYDROCHLORIDE 50 MG: 50 TABLET ORAL at 08:01

## 2019-09-04 RX ADMIN — ARIPIPRAZOLE 10 MG: 10 TABLET ORAL at 08:01

## 2019-09-04 RX ADMIN — ATENOLOL 25 MG: 25 TABLET ORAL at 08:01

## 2019-09-04 NOTE — PLAN OF CARE
Problem: Patient Care Overview  Goal: Plan of Care Review  Outcome: Ongoing (interventions implemented as appropriate)   09/04/19 2312   Coping/Psychosocial   Plan of Care Reviewed With patient   Coping/Psychosocial   Patient Agreement with Plan of Care agrees   OTHER   Outcome Summary Patient isolated self in room this shift and avoided social interactions. Denies any anxiety, depression, SI/HI or hallucinations.    Plan of Care Review   Progress no change       Problem: Overarching Goals (Adult)  Goal: Adheres to Safety Considerations for Self and Others  Outcome: Ongoing (interventions implemented as appropriate)    Goal: Optimized Coping Skills in Response to Life Stressors  Outcome: Ongoing (interventions implemented as appropriate)    Goal: Develops/Participates in Therapeutic [place] to Support Successful Transition  Outcome: Ongoing (interventions implemented as appropriate)

## 2019-09-04 NOTE — PROGRESS NOTES
LOS: 2 days   Patient Care Team:  Venkata Paris MD as PCP - General (Family Medicine)    Chief Complaint: Patient has no complaints, he says he is ready to go home      Interval History: He denies depression or anxiety, rates both at 0.  He has slept well, says he has slept all night long.  He denies he is craving for any drugs and denies any withdrawal symptoms.  He denies SI HI hallucinations or paranoia he is well oriented.  He declines referral to long-term drug rehab, but is agreeable for follow-up at [facility] in [facility]  He is anxious for discharge, and also says he has an appointment at [facility] in [facility] tomorrow.        Vital Signs    Temp:  [97.6 °F (36.4 °C)-97.9 °F (36.6 °C)] 97.9 °F (36.6 °C)  Heart Rate:  [] 95  Resp:  [18-20] 20  BP: (116-127)/(68-84) 127/84    Lab Results:   Lab Results (last 24 hours)     ** No results found for the last 24 hours. **           Labs:     Lab Results (last 24 hours)     ** No results found for the last 24 hours. **                        Exam:  Mental Status Exam:     Hygiene:   fair  Cooperation:  Cooperative  Eye Contact:  Good  Psychomotor Behavior:  Appropriate  Affect:  Appropriate  Speech:  Normal  Thought Progress:  Goal directed  Thought Content:  Mood congruent  Suicidal:  None  Homicidal:  None  Hallucinations:  None  Delusion:  None  Memory:  Intact  Orientation:  Person, Place and Time  Reliability:  fair  Insight:  Fair  Judgement:  Fair  Impulse Control:  Fair    Results Review:    Lab Results (last 24 hours)     ** No results found for the last 24 hours. **          Medication Review:  Hospital Medications (active)       Dose Frequency Start End    acetaminophen (TYLENOL) tablet 650 mg 650 mg Every 6 Hours PRN 9/2/2019     Sig - Route: Take 2 tablets by mouth Every 6 (Six) Hours As Needed for Mild Pain  or Moderate Pain  (Severe Pain (7-10)). - Oral    Cosign for Ordering: Accepted by Carlton Lozada MD on 9/2/2019   "8:08 AM    aluminum-magnesium hydroxide-simethicone (MAALOX MAX) 400-400-40 MG/5ML suspension 15 mL 15 mL Every 6 Hours PRN 9/2/2019     Sig - Route: Take 15 mL by mouth Every 6 (Six) Hours As Needed for Indigestion or Heartburn. - Oral    Cosign for Ordering: Accepted by Carlton Lozada MD on 9/2/2019  8:08 AM    ARIPiprazole (ABILIFY) tablet 10 mg 10 mg Daily 9/4/2019     Sig - Route: Take 1 tablet by mouth Daily. - Oral    atenolol (TENORMIN) tablet 25 mg 25 mg Every 12 Hours Scheduled 9/2/2019     Sig - Route: Take 1 tablet by mouth Every 12 (Twelve) Hours. - Oral    benzonatate (TESSALON) capsule 100 mg 100 mg 3 Times Daily PRN 9/2/2019     Sig - Route: Take 1 capsule by mouth 3 (Three) Times a Day As Needed for Cough. - Oral    Cosign for Ordering: Accepted by Carlton Lozada MD on 9/2/2019  8:08 AM    benztropine (COGENTIN) injection 1 mg 1 mg Once As Needed 9/2/2019     Sig - Route: Inject 1 mL into the appropriate muscle as directed by prescriber 1 (One) Time As Needed (Dystonia - If Unable to Take PO - Contact Provider if Additional Doses Needed). - Intramuscular    Cosign for Ordering: Accepted by Carlton Lozada MD on 9/2/2019  8:08 AM    Linked Group 1:  \"Or\" Linked Group Details        benztropine (COGENTIN) tablet 2 mg 2 mg Once As Needed 9/2/2019     Sig - Route: Take 2 tablets by mouth 1 (One) Time As Needed (Dystonia - Contact Provider if Additional Doses Needed). - Oral    Cosign for Ordering: Accepted by Carlton Lozada MD on 9/2/2019  8:08 AM    Linked Group 1:  \"Or\" Linked Group Details        famotidine (PEPCID) tablet 20 mg 20 mg 2 Times Daily PRN 9/2/2019     Sig - Route: Take 1 tablet by mouth 2 (Two) Times a Day As Needed for Heartburn. - Oral    Cosign for Ordering: Accepted by Carlton Lozada MD on 9/2/2019  8:08 AM    hydrOXYzine (ATARAX) tablet 50 mg 50 mg Every 6 Hours PRN 9/2/2019     Sig - Route: Take 1 tablet by mouth Every 6 (Six) " Hours As Needed for Anxiety. - Oral    Cosign for Ordering: Accepted by Carlton Lozada MD on 9/2/2019  8:08 AM    ibuprofen (ADVIL,MOTRIN) tablet 400 mg 400 mg Every 6 Hours PRN 9/2/2019     Sig - Route: Take 1 tablet by mouth Every 6 (Six) Hours As Needed for Mild Pain  or Moderate Pain  (Severe Pain (7-10)). - Oral    Cosign for Ordering: Accepted by Carlton Lozada MD on 9/2/2019  8:08 AM    loperamide (IMODIUM) capsule 2 mg 2 mg Every 2 Hours PRN 9/2/2019     Sig - Route: Take 1 capsule by mouth Every 2 (Two) Hours As Needed for Diarrhea (After Each Loose Stool - Do Not Exceed 16mg in 24 Hours). - Oral    Cosign for Ordering: Accepted by Carlton Lozada MD on 9/2/2019  8:08 AM    magnesium hydroxide (MILK OF MAGNESIA) suspension 2400 mg/10mL 10 mL 10 mL Daily PRN 9/2/2019     Sig - Route: Take 10 mL by mouth Daily As Needed for Constipation. - Oral    Cosign for Ordering: Accepted by Carlton Lozada MD on 9/2/2019  8:08 AM    nicotine (NICODERM CQ) 21 MG/24HR patch 1 patch 1 patch Every 24 Hours Scheduled 9/2/2019     Sig - Route: Place 1 patch on the skin as directed by provider Daily. - Transdermal    Cosign for Ordering: Accepted by Carlton Lozada MD on 9/2/2019  8:08 AM    ondansetron (ZOFRAN) tablet 4 mg 4 mg Every 6 Hours PRN 9/2/2019     Sig - Route: Take 1 tablet by mouth Every 6 (Six) Hours As Needed for Nausea or Vomiting. - Oral    Cosign for Ordering: Accepted by Carlton Lozada MD on 9/2/2019  8:08 AM    sodium chloride nasal spray 2 spray 2 spray As Needed 9/2/2019     Sig - Route: 2 sprays by Each Nare route As Needed for Congestion. - Each Nare    Cosign for Ordering: Accepted by Carlton Lozada MD on 9/2/2019  8:08 AM    traZODone (DESYREL) tablet 50 mg 50 mg Nightly PRN 9/2/2019     Sig - Route: Take 1 tablet by mouth At Night As Needed for Sleep. - Oral    Cosign for Ordering: Accepted by Carlton Lozada MD on  9/2/2019  8:08 AM                 Assessment/Plan     Assessment: Stimulant abuse with stimulant-induced psychosis Abilify 10 mg daily  Cannabis use disorder  Borderline IQ      Treatment Plan: Refer to therapist to confirm discharge home with father, therapists has been unable to reach father, who was out of town yesterday and was supposed to return today.        I have reviewed and approved the behavioral health treatment plans and problem list. Yes        Lauren Lopez MD  09/04/19  12:16 PM

## 2019-09-05 VITALS
BODY MASS INDEX: 29.22 KG/M2 | DIASTOLIC BLOOD PRESSURE: 76 MMHG | SYSTOLIC BLOOD PRESSURE: 110 MMHG | HEART RATE: 95 BPM | TEMPERATURE: 97.9 F | HEIGHT: 68 IN | WEIGHT: 192.8 LBS | OXYGEN SATURATION: 95 % | RESPIRATION RATE: 16 BRPM

## 2019-09-05 RX ORDER — TRAZODONE HYDROCHLORIDE 50 MG/1
50 TABLET ORAL NIGHTLY PRN
Qty: 15 TABLET | Refills: 0 | Status: SHIPPED | OUTPATIENT
Start: 2019-09-05 | End: 2019-09-20

## 2019-09-05 RX ORDER — ARIPIPRAZOLE 10 MG/1
10 TABLET ORAL DAILY
Qty: 15 TABLET | Refills: 0 | Status: SHIPPED | OUTPATIENT
Start: 2019-09-06 | End: 2019-09-21

## 2019-09-05 RX ADMIN — NICOTINE 1 PATCH: 21 PATCH TRANSDERMAL at 08:10

## 2019-09-05 RX ADMIN — ARIPIPRAZOLE 10 MG: 10 TABLET ORAL at 08:09

## 2019-09-05 RX ADMIN — ATENOLOL 25 MG: 25 TABLET ORAL at 08:10

## 2019-09-05 NOTE — DISCHARGE SUMMARY
Date of Discharge:  9/5/2019    Presenting Problem/History of Present Illness  MDD (major depressive disorder) [F32.9]     Hospital Course  Patient was hospitalized on 9/2/2019 after he presented to the emergency room with father who complained that patient has been behaving bizarre such as coming to the house screaming and taking a broom handle swinging at the demons.  He was seen by Dr. Lozada on 9/2/2019 and started on Abilify 5 mg daily.  I assumed patient care on 9/3/2019 when I noted that patient was denying SI HI hallucinations or paranoia he was well oriented, and was insisting on discharge.  He he remained agitated because he could not be discharged as his father was out of town.  I increased his Abilify to 10 mg daily.  Patient was noted to be improved on 9/4/2019.  He was discharged on 9/5/2019 when his father returned from out of town trip and agreed for his discharge home.  [facility] on 9/12/2019, patient will also need to follow-up with substance abuse counselor and/ or CD IOP if available.  Procedures Performed         Consults:   Consults     No orders found from 8/4/2019 to 9/3/2019.                  Discharge Disposition      Discharge Medications     Your medication list      ASK your doctor about these medications      Instructions Last Dose Given Next Dose Due   ARIPiprazole 5 MG tablet  Commonly known as:  ABILIFY      Take 1 tablet by mouth Daily.       atenolol 25 MG tablet  Commonly known as:  TENORMIN      Take 1 tablet by mouth Every 12 (Twelve) Hours.              Lab Results (last 24 hours)     ** No results found for the last 24 hours. **        Follow-up Appointments  No future appointments.      Discharge Diagnosis: Stimulant abuse with stimulant-induced psychosis  Cannabis use disorder  Borderline IQ      Lauren Lopez MD  09/05/19  11:07 AM

## 2019-09-05 NOTE — PLAN OF CARE
Problem: Patient Care Overview  Goal: Plan of Care Review  Outcome: Ongoing (interventions implemented as appropriate)   09/05/19 0859   Coping/Psychosocial   Plan of Care Reviewed With guardian;father   Coping/Psychosocial   Patient Agreement with Plan of Care agrees     0850  Therapist returned call from Patient's father who is also guardian Aric Mims at [phone]; Therapist was made aware of yesterday's travels and the inability to return home yesterday due to traffic delays. Aric reports he is now home and is agreeable for Patient to be discharged today and return home to his care. Aric requested follow-up to be rescheduled with [facility] this date. Aric also inquired about Abilify injection and the location where he could obtain this. Aric reports uncertainty if [facility] administers; Therapist to follow-up and to contact [facility] and [facility] in [place], [place]. Therapist to follow-up with concerns regarding injection. Aric reports he will be present today around [id number] today to provide transportation for Patient.     Therapist contacted [facility] and inquired about Abilify injections; Therapist informed that they do administer there but would have to be discussed during therapy appointment; Therapist informed Aric. Therapist also made Aric aware that Therapist reviewed current medication and previous hospitalization medication and did not see injection was discussed. Aric reports that he was uncertain if Patient would be taking tablets or getting injection. Therapist informed Aric that it appears Patient will continue Abilify tablets and to discuss desires of injection during [facility] appointment. Aric agreeable. Aric requested to be contacted at [phone] when discharged.     Patient also reports need for Clonidine due to concerns related to blood pressure. Therapist asked Patient to discuss this further with Dr. Lopez prior to discharge.     Therapist made the following  aftercare appointment:    [facility]  [id number] [place] [address]   [phone]    Appointment: September 12, 2019 at 1:00PM with Phuong for therapy.  [facility] can administer Abilify injections; this would need to be discussed further during this appointment.

## 2019-09-05 NOTE — DISCHARGE INSTR - APPOINTMENTS
[facility]  [id number] [place] [address]   [phone]    Appointment: September 12, 2019 at 1:00PM with Phuong for therapy.  [facility] can administer Abilify injections; this would need to be discussed further during this appointment.

## 2019-09-05 NOTE — PLAN OF CARE
Problem: Patient Care Overview  Goal: Plan of Care Review  Outcome: Ongoing (interventions implemented as appropriate)   09/05/19 0310   Coping/Psychosocial   Plan of Care Reviewed With patient   Coping/Psychosocial   Patient Agreement with Plan of Care agrees   Plan of Care Review   Progress no change       Problem: Overarching Goals (Adult)  Goal: Adheres to Safety Considerations for Self and Others  Outcome: Ongoing (interventions implemented as appropriate)    Goal: Optimized Coping Skills in Response to Life Stressors  Outcome: Ongoing (interventions implemented as appropriate)    Goal: Develops/Participates in Therapeutic [place] to Support Successful Transition  Outcome: Ongoing (interventions implemented as appropriate)

## 2020-02-09 ENCOUNTER — HOSPITAL ENCOUNTER (EMERGENCY)
Facility: HOSPITAL | Age: 35
Discharge: PSYCHIATRIC OR SUBSTANCE USE FACILITY | End: 2020-02-10
Attending: FAMILY MEDICINE | Admitting: FAMILY MEDICINE

## 2020-02-09 DIAGNOSIS — R45.851 DEPRESSION WITH SUICIDAL IDEATION: Primary | ICD-10-CM

## 2020-02-09 DIAGNOSIS — F32.A DEPRESSION WITH SUICIDAL IDEATION: Primary | ICD-10-CM

## 2020-02-09 LAB
6-ACETYL MORPHINE: NEGATIVE
AMPHET+METHAMPHET UR QL: POSITIVE
BARBITURATES UR QL SCN: NEGATIVE
BENZODIAZ UR QL SCN: NEGATIVE
BILIRUB UR QL STRIP: NEGATIVE
BUPRENORPHINE SERPL-MCNC: NEGATIVE NG/ML
CANNABINOIDS SERPL QL: POSITIVE
CLARITY UR: ABNORMAL
COCAINE UR QL: NEGATIVE
COLOR UR: YELLOW
GLUCOSE UR STRIP-MCNC: NEGATIVE MG/DL
HGB UR QL STRIP.AUTO: NEGATIVE
KETONES UR QL STRIP: NEGATIVE
LEUKOCYTE ESTERASE UR QL STRIP.AUTO: NEGATIVE
METHADONE UR QL SCN: NEGATIVE
NITRITE UR QL STRIP: NEGATIVE
OPIATES UR QL: NEGATIVE
OXYCODONE UR QL SCN: NEGATIVE
PCP UR QL SCN: NEGATIVE
PH UR STRIP.AUTO: 7 [PH] (ref 5–8)
PROT UR QL STRIP: NEGATIVE
SP GR UR STRIP: 1.01 (ref 1–1.03)
UROBILINOGEN UR QL STRIP: ABNORMAL

## 2020-02-09 PROCEDURE — 80307 DRUG TEST PRSMV CHEM ANLYZR: CPT | Performed by: EMERGENCY MEDICINE

## 2020-02-09 PROCEDURE — 99284 EMERGENCY DEPT VISIT MOD MDM: CPT

## 2020-02-09 PROCEDURE — 81003 URINALYSIS AUTO W/O SCOPE: CPT | Performed by: EMERGENCY MEDICINE

## 2020-02-10 ENCOUNTER — HOSPITAL ENCOUNTER (INPATIENT)
Facility: HOSPITAL | Age: 35
LOS: 2 days | Discharge: HOME | End: 2020-02-12
Attending: PSYCHIATRY & NEUROLOGY | Admitting: PSYCHIATRY & NEUROLOGY

## 2020-02-10 VITALS
OXYGEN SATURATION: 99 % | BODY MASS INDEX: 22.73 KG/M2 | TEMPERATURE: 98.6 F | HEART RATE: 95 BPM | HEIGHT: 68 IN | WEIGHT: 150 LBS | DIASTOLIC BLOOD PRESSURE: 72 MMHG | SYSTOLIC BLOOD PRESSURE: 125 MMHG | RESPIRATION RATE: 18 BRPM

## 2020-02-10 LAB
ALBUMIN SERPL-MCNC: 4.2 G/DL (ref 3.5–5.2)
ALBUMIN/GLOB SERPL: 1.4 G/DL
ALP SERPL-CCNC: 78 U/L (ref 39–117)
ALT SERPL W P-5'-P-CCNC: 36 U/L (ref 1–41)
ANION GAP SERPL CALCULATED.3IONS-SCNC: 12.9 MMOL/L (ref 5–15)
AST SERPL-CCNC: 24 U/L (ref 1–40)
BASOPHILS # BLD AUTO: 0.05 10*3/MM3 (ref 0–0.2)
BASOPHILS NFR BLD AUTO: 0.5 % (ref 0–1.5)
BILIRUB SERPL-MCNC: 0.2 MG/DL (ref 0.2–1.2)
BUN BLD-MCNC: 21 MG/DL (ref 6–20)
BUN/CREAT SERPL: 26.6 (ref 7–25)
CALCIUM SPEC-SCNC: 9.1 MG/DL (ref 8.6–10.5)
CHLORIDE SERPL-SCNC: 103 MMOL/L (ref 98–107)
CO2 SERPL-SCNC: 24.1 MMOL/L (ref 22–29)
CREAT BLD-MCNC: 0.79 MG/DL (ref 0.76–1.27)
DEPRECATED RDW RBC AUTO: 43 FL (ref 37–54)
EOSINOPHIL # BLD AUTO: 0.09 10*3/MM3 (ref 0–0.4)
EOSINOPHIL NFR BLD AUTO: 1 % (ref 0.3–6.2)
ERYTHROCYTE [DISTWIDTH] IN BLOOD BY AUTOMATED COUNT: 12.5 % (ref 12.3–15.4)
ETHANOL BLD-MCNC: <10 MG/DL (ref 0–10)
ETHANOL UR QL: <0.01 %
GFR SERPL CREATININE-BSD FRML MDRD: 113 ML/MIN/1.73
GLOBULIN UR ELPH-MCNC: 2.9 GM/DL
GLUCOSE BLD-MCNC: 135 MG/DL (ref 65–99)
HCT VFR BLD AUTO: 44.9 % (ref 37.5–51)
HGB BLD-MCNC: 14.4 G/DL (ref 13–17.7)
IMM GRANULOCYTES # BLD AUTO: 0.02 10*3/MM3 (ref 0–0.05)
IMM GRANULOCYTES NFR BLD AUTO: 0.2 % (ref 0–0.5)
LYMPHOCYTES # BLD AUTO: 2.93 10*3/MM3 (ref 0.7–3.1)
LYMPHOCYTES NFR BLD AUTO: 31.3 % (ref 19.6–45.3)
MAGNESIUM SERPL-MCNC: 1.9 MG/DL (ref 1.6–2.6)
MCH RBC QN AUTO: 29.6 PG (ref 26.6–33)
MCHC RBC AUTO-ENTMCNC: 32.1 G/DL (ref 31.5–35.7)
MCV RBC AUTO: 92.4 FL (ref 79–97)
MONOCYTES # BLD AUTO: 0.5 10*3/MM3 (ref 0.1–0.9)
MONOCYTES NFR BLD AUTO: 5.3 % (ref 5–12)
NEUTROPHILS # BLD AUTO: 5.77 10*3/MM3 (ref 1.7–7)
NEUTROPHILS NFR BLD AUTO: 61.7 % (ref 42.7–76)
NRBC BLD AUTO-RTO: 0 /100 WBC (ref 0–0.2)
PLATELET # BLD AUTO: 218 10*3/MM3 (ref 140–450)
PMV BLD AUTO: 10.6 FL (ref 6–12)
POTASSIUM BLD-SCNC: 3.9 MMOL/L (ref 3.5–5.2)
PROT SERPL-MCNC: 7.1 G/DL (ref 6–8.5)
RBC # BLD AUTO: 4.86 10*6/MM3 (ref 4.14–5.8)
SODIUM BLD-SCNC: 140 MMOL/L (ref 136–145)
WBC NRBC COR # BLD: 9.36 10*3/MM3 (ref 3.4–10.8)

## 2020-02-10 PROCEDURE — 85025 COMPLETE CBC W/AUTO DIFF WBC: CPT | Performed by: EMERGENCY MEDICINE

## 2020-02-10 PROCEDURE — 93005 ELECTROCARDIOGRAM TRACING: CPT | Performed by: PSYCHIATRY & NEUROLOGY

## 2020-02-10 PROCEDURE — 80053 COMPREHEN METABOLIC PANEL: CPT | Performed by: EMERGENCY MEDICINE

## 2020-02-10 PROCEDURE — 83735 ASSAY OF MAGNESIUM: CPT | Performed by: EMERGENCY MEDICINE

## 2020-02-10 PROCEDURE — 80307 DRUG TEST PRSMV CHEM ANLYZR: CPT | Performed by: EMERGENCY MEDICINE

## 2020-02-10 PROCEDURE — 99223 1ST HOSP IP/OBS HIGH 75: CPT | Performed by: PSYCHIATRY & NEUROLOGY

## 2020-02-10 PROCEDURE — 93010 ELECTROCARDIOGRAM REPORT: CPT | Performed by: INTERNAL MEDICINE

## 2020-02-10 RX ORDER — MIRTAZAPINE 15 MG/1
15 TABLET, FILM COATED ORAL NIGHTLY
Status: DISCONTINUED | OUTPATIENT
Start: 2020-02-10 | End: 2020-02-12 | Stop reason: HOSPADM

## 2020-02-10 RX ORDER — BENZTROPINE MESYLATE 1 MG/1
2 TABLET ORAL ONCE AS NEEDED
Status: DISCONTINUED | OUTPATIENT
Start: 2020-02-10 | End: 2020-02-12 | Stop reason: HOSPADM

## 2020-02-10 RX ORDER — OLANZAPINE 5 MG/1
5 TABLET, ORALLY DISINTEGRATING ORAL 3 TIMES DAILY PRN
Status: DISCONTINUED | OUTPATIENT
Start: 2020-02-10 | End: 2020-02-12 | Stop reason: HOSPADM

## 2020-02-10 RX ORDER — NICOTINE 21 MG/24HR
1 PATCH, TRANSDERMAL 24 HOURS TRANSDERMAL
Status: DISCONTINUED | OUTPATIENT
Start: 2020-02-10 | End: 2020-02-12 | Stop reason: HOSPADM

## 2020-02-10 RX ORDER — ONDANSETRON 4 MG/1
4 TABLET, FILM COATED ORAL EVERY 6 HOURS PRN
Status: DISCONTINUED | OUTPATIENT
Start: 2020-02-10 | End: 2020-02-12 | Stop reason: HOSPADM

## 2020-02-10 RX ORDER — ALUMINA, MAGNESIA, AND SIMETHICONE 2400; 2400; 240 MG/30ML; MG/30ML; MG/30ML
15 SUSPENSION ORAL EVERY 6 HOURS PRN
Status: DISCONTINUED | OUTPATIENT
Start: 2020-02-10 | End: 2020-02-12 | Stop reason: HOSPADM

## 2020-02-10 RX ORDER — QUETIAPINE FUMARATE 100 MG/1
50 TABLET, FILM COATED ORAL ONCE
Status: COMPLETED | OUTPATIENT
Start: 2020-02-10 | End: 2020-02-10

## 2020-02-10 RX ORDER — BENZONATATE 100 MG/1
100 CAPSULE ORAL 3 TIMES DAILY PRN
Status: DISCONTINUED | OUTPATIENT
Start: 2020-02-10 | End: 2020-02-12 | Stop reason: HOSPADM

## 2020-02-10 RX ORDER — FAMOTIDINE 20 MG/1
20 TABLET, FILM COATED ORAL 2 TIMES DAILY PRN
Status: DISCONTINUED | OUTPATIENT
Start: 2020-02-10 | End: 2020-02-12 | Stop reason: HOSPADM

## 2020-02-10 RX ORDER — BENZTROPINE MESYLATE 1 MG/ML
1 INJECTION INTRAMUSCULAR; INTRAVENOUS ONCE AS NEEDED
Status: DISCONTINUED | OUTPATIENT
Start: 2020-02-10 | End: 2020-02-12 | Stop reason: HOSPADM

## 2020-02-10 RX ORDER — LOPERAMIDE HYDROCHLORIDE 2 MG/1
2 CAPSULE ORAL
Status: DISCONTINUED | OUTPATIENT
Start: 2020-02-10 | End: 2020-02-12 | Stop reason: HOSPADM

## 2020-02-10 RX ORDER — HYDROXYZINE 50 MG/1
50 TABLET, FILM COATED ORAL EVERY 6 HOURS PRN
Status: DISCONTINUED | OUTPATIENT
Start: 2020-02-10 | End: 2020-02-12 | Stop reason: HOSPADM

## 2020-02-10 RX ORDER — TRAZODONE HYDROCHLORIDE 50 MG/1
50 TABLET ORAL NIGHTLY PRN
Status: DISCONTINUED | OUTPATIENT
Start: 2020-02-10 | End: 2020-02-10

## 2020-02-10 RX ORDER — ARIPIPRAZOLE 10 MG/1
5 TABLET ORAL DAILY
Status: DISCONTINUED | OUTPATIENT
Start: 2020-02-10 | End: 2020-02-12 | Stop reason: HOSPADM

## 2020-02-10 RX ORDER — ECHINACEA PURPUREA EXTRACT 125 MG
2 TABLET ORAL AS NEEDED
Status: DISCONTINUED | OUTPATIENT
Start: 2020-02-10 | End: 2020-02-12 | Stop reason: HOSPADM

## 2020-02-10 RX ORDER — LORAZEPAM 1 MG/1
1 TABLET ORAL 3 TIMES DAILY
Status: COMPLETED | OUTPATIENT
Start: 2020-02-10 | End: 2020-02-11

## 2020-02-10 RX ORDER — ACETAMINOPHEN 325 MG/1
650 TABLET ORAL EVERY 6 HOURS PRN
Status: DISCONTINUED | OUTPATIENT
Start: 2020-02-10 | End: 2020-02-12 | Stop reason: HOSPADM

## 2020-02-10 RX ORDER — IBUPROFEN 400 MG/1
400 TABLET ORAL EVERY 6 HOURS PRN
Status: DISCONTINUED | OUTPATIENT
Start: 2020-02-10 | End: 2020-02-12 | Stop reason: HOSPADM

## 2020-02-10 RX ADMIN — LORAZEPAM 1 MG: 1 TABLET ORAL at 15:43

## 2020-02-10 RX ADMIN — LORAZEPAM 1 MG: 1 TABLET ORAL at 20:40

## 2020-02-10 RX ADMIN — MIRTAZAPINE 15 MG: 15 TABLET, FILM COATED ORAL at 20:40

## 2020-02-10 RX ADMIN — ARIPIPRAZOLE 5 MG: 10 TABLET ORAL at 13:30

## 2020-02-10 RX ADMIN — QUETIAPINE FUMARATE 50 MG: 100 TABLET ORAL at 01:16

## 2020-02-10 RX ADMIN — HYDROXYZINE HYDROCHLORIDE 50 MG: 50 TABLET ORAL at 01:39

## 2020-02-10 NOTE — NURSING NOTE
"Patient brought to ED by guardian for evaluation due to SI and hallucinations. Pt reports SI thoughts, but does not report specific plan, stating \"anyway, it don't matter.\" Pt guardian is father, Aric Mims Phone number [phone]. Pt has hx of multiple admissions to this facility, with last admission on 9/2/19. Per intake report patient's guardian reports that he has been hearing voices and seeing things and throwing objects at his home. Pt will not elaborate on hallucinations. Per intake report guardian also stated that patient had been hiding knives in his mattress. Pt has hx of Bipolar, ADD/ADHD and schizophrenia. Pt reports that he has not been taking his meds, but is unable to provide timeline regarding the last time that he took them. Pt also has not been going to [facility]. Pt reports smoking weed and meth yesterday. Pt UDS pos for meth and THC. Pt reports sleep as poor and that he has been having nightmares. Pt reports stressor as, \"seeing things and hearing things.\" Pt reports appetite as good. Pt denies any HI.  "

## 2020-02-10 NOTE — NURSING NOTE
Pt arrived in intake, searched with two staff members present, pt's belongings placed in safe storage, safety precautions in place.

## 2020-02-10 NOTE — NURSING NOTE
Spoke with Dr. Burt, discussed assessment and labs, new orders to admit the patient, routine orders SP3.  [id number]

## 2020-02-10 NOTE — NURSING NOTE
"Pt's father Aric Mims is here, is also the pt's guardian, gives us permission to treat/admit/and administer medications.    Pt states he was on medicine for schizophrenia and bipolar, he is unable to tell me what it was, but states he has not been to comp care and has not been compliant with his medication for a long time.    Pt states he is SI without a plan, denies any HI, but states he is having hallucinations seeing \"a bunch of stuff\", and has also heard voices telling him \"to do stuff\"    Pt states he \"smokes a little weed, a little dope, crystal meth, and alcohol when he has it\" states he smoked a little crystal meth today.    Pt's father states the pt has been hiding knifes under his mattress, states he has been hearing voices and seeing people, states he has been throwing objects at his home.      Pt rates depression 10/10 and anxiety 10/10.    "

## 2020-02-10 NOTE — PLAN OF CARE
Problem: Patient Care Overview  Goal: Plan of Care Review  Outcome: Ongoing (interventions implemented as appropriate)  Flowsheets (Taken 2/10/2020 1424)  Consent Given to Review Plan with: Therapist attempted contact with patient's father who is his guardian.  Progress: no change  Plan of Care Reviewed With: patient  Patient Agreement with Plan of Care: unable to participate  Outcome Summary: Patient was unable to engage in plan of care review today.  Attempted contact with his guardian and left a message for return call.  Completed psychosocial assessment for patient's chart and history.     Problem: Patient Care Overview  Goal: Individualization and Mutuality  Outcome: Ongoing (interventions implemented as appropriate)  Flowsheets  Taken 2/10/2020 1417 by Mariella Quintero LCSW  Patient Personal Strengths: expressive of emotions;expressive of needs;family/social support;motivated for treatment;resourceful;spiritual/Sabianist support;stable living environment  Patient Vulnerabilities: Ineffective coping, substance abuse, history of multiple admissions  Taken 2/10/2020 1424 by Mariella Quintero LCSW  Patient Specific Goals (Include Timeframe): Patient to deny suicidal and homicidal ideation prior to discharge.  Patient to engage in safe disposition planning with his father and this therapist.  During his 3 to 5-day hospital stay.  Patient to identify 2-3 healthy coping skills prior to discharge.  Patient Specific Interventions: Brief therapy and family session to discuss safety, healthy coping and safe disposition planning.  Taken 2/10/2020 0119 by Emilee Perry RN  What Information Would Help Us Give You More Personalized Care?: Father is guardian, Aric Mims, Phone number- [phone]     Problem: Patient Care Overview  Goal: Discharge Needs Assessment  Outcome: Ongoing (interventions implemented as appropriate)  Flowsheets  Taken 2/10/2020 1424 by Mariella Quintero  LCSW  Outpatient/Agency/Support Group Needs: outpatient substance abuse treatment (specify);outpatient counseling;outpatient psychiatric care (specify);outpatient medication management  Discharge Coordination/Progress: Patient has Medicare a and B and family to provide transportation.  Anticipated Discharge Disposition: home or self-care  Current Discharge Risk: psychiatric illness;substance use/abuse  Concerns to be Addressed: coping/stress;suicidal;substance/tobacco abuse/use  Readmission Within the Last 30 Days: no previous admission in last 30 days  Patient's Choice of Community Agency(s): previously [facility]-to be determined  Taken 2/10/2020 0119 by Emilee Perry RN  Transportation Anticipated: family or friend will provide  Patient/Family Anticipated Services at Transition: outpatient care;mental health services  Patient/Family Anticipates Transition to: home with family     Problem: Patient Care Overview  Goal: Interprofessional Rounds/Family Conf  Outcome: Ongoing (interventions implemented as appropriate)  Flowsheets (Taken 2/10/2020 2319)  Participants: family; milieu/psych techs; nursing; patient; psychiatrist; social work  Summary: Patient's father is his guardian therapist attempted contact today and left a message.  Treatment team to discuss patient progress.    DATA:         Therapist attempted to meet individually with patient this date.  Patient unable to engage.  Attempted contact with patients father and left a message for a return call.     Clinical Maneuvering/Intervention:     Therapist attempted to meet with patient today however patient unable to engage in individual session.  Attempted contact with patient's father today and left a message for return call.     ASSESSMENT:      Patient is a 35-year-old , disabled single male residing with his father in [facility].  Patient presents to the emergency room with SI and AVH.  Patient has a history of multiple admissions last  being in September 2019.  Upon admission there were reports the patient was putting knives under his mattress at home.  Patient was also reported to be throwing things in the home.  Patient reports auditory hallucinations prior to admission and was unable to describe these.  Patient has a history of treatment with [facility] in [place] and reports he did attend there a few times following his last hospitalization.  Therapist attempted contact with patient's father who is his guardian and left a message for return call.  Efforts will continue to engage in safe disposition planning.     PLAN:       Patient to remain hospitalized this date.     Treatment team will focus efforts on stabilizing patient's acute symptoms while providing education on healthy coping and crisis management to reduce hospitalizations.   Patient requires daily psychiatrist evaluation and 24/7 nursing supervision to promote patient  safety.     Therapist will offer 1-4 individual sessions (20-30 minutes each), 1 therapy group daily, family education, and appropriate referral.    Therapist will continue efforts to engage in safe disposition planning with patient and his guardian.

## 2020-02-10 NOTE — PLAN OF CARE
Problem: Patient Care Overview  Goal: Plan of Care Review  Outcome: Ongoing (interventions implemented as appropriate)  Flowsheets  Taken 2/10/2020 0144  Progress: no change  Outcome Summary: Patient new admit to unit, care-plans initiated  Taken 2/10/2020 0119  Plan of Care Reviewed With: patient  Patient Agreement with Plan of Care: agrees

## 2020-02-11 PROCEDURE — 99232 SBSQ HOSP IP/OBS MODERATE 35: CPT | Performed by: PSYCHIATRY & NEUROLOGY

## 2020-02-11 PROCEDURE — 25010000002 ARIPIPRAZOLE ER 400 MG PREFILLED SYRINGE: Performed by: PSYCHIATRY & NEUROLOGY

## 2020-02-11 RX ADMIN — ARIPIPRAZOLE 400 MG: KIT at 10:47

## 2020-02-11 RX ADMIN — ARIPIPRAZOLE 5 MG: 10 TABLET ORAL at 08:18

## 2020-02-11 RX ADMIN — LORAZEPAM 1 MG: 1 TABLET ORAL at 08:18

## 2020-02-11 RX ADMIN — MIRTAZAPINE 15 MG: 15 TABLET, FILM COATED ORAL at 20:25

## 2020-02-11 RX ADMIN — NICOTINE 1 PATCH: 21 PATCH TRANSDERMAL at 08:19

## 2020-02-11 NOTE — PLAN OF CARE
Problem: Patient Care Overview  Goal: Plan of Care Review  Outcome: Ongoing (interventions implemented as appropriate)  Flowsheets (Taken 2/11/2020 1717)  Progress: improving  Plan of Care Reviewed With: patient  Patient Agreement with Plan of Care: agrees  Outcome Summary: Pt calm and cooperative. Rates anxiety 3. Denies depression, SI, HI or AVH. Reports sleep and appetite as good.

## 2020-02-11 NOTE — PROGRESS NOTES
Patient has been scheduled the following appointment:     [facility]  [facility] [address]  [phone]    Feb 17 2020 at 1:00pm with Bhaskar for IOP Intake.

## 2020-02-11 NOTE — DISCHARGE INSTR - APPOINTMENTS
[facility]  [address]  [facility] [address]  [phone]    Feb 17 2020 at 1:00pm with Bhaskar for IOP Intake.     Please request to have case management at this appointment.

## 2020-02-11 NOTE — PROGRESS NOTES
"INPATIENT PSYCHIATRIC PROGRESS NOTE    Name:  Cruz Mims  :  1985  MRN:  [id number]  Visit Number:  [id number]  Length of stay:  1    Behavioral Health Treatment Plan and Problem List: I have reviewed and approved the Behavioral Health Treatment Plan and Problem list.    SUBJECTIVE    CC/ Focus of exam: psychosis/ substance abuse.     Patient's subjective status: \"doing a lot better\"     INTERVAL HISTORY: \"Just tired, not slept for 2 days\" PTA. Patient denying active paranoia or other psychotic thought content this morning.   Question about going with IM depo Aripiprazole with the patient history of poor tx complaisance. Has apparently been on depo antipsychotic in the past that were successful in extending time out of the hospital at least according to the patient. Will go ahead with Daniella Wilkins.   Patient has absolutely no interest in residential CD treatment, just might try the IOP at [facility].       Depression rating \"Not depressed but tired\"   Anxiety rating 2-3/10, much less restlessness.   Sleep:sletp well  Withdrawal sx:none  Craving: says not       Review of Systems   Respiratory: Negative.    Cardiovascular: Negative.    Gastrointestinal: Negative.    Musculoskeletal: Negative.    Neurological: Negative.          OBJECTIVE    Temp:  [97.1 °F (36.2 °C)-98.1 °F (36.7 °C)] 98.1 °F (36.7 °C)  Heart Rate:  [84-88] 88  Resp:  [16-18] 16  BP: (119-130)/(72-83) 130/83    MENTAL STATUS EXAM:      Appearance:Casually dressed, good hygeine.   Cooperation:Cooperative  Psychomotor: No psychomotor agitation/retardation, No EPS, No motor tics  Speech-normal rate, amount.   Mood/Affect:  Euthymic  Thought Processes:  coherent  Thought Content:  Unable to demonstrate any formal thought disorder this morning  Hallucination(s): none  Hopelessness: No  Optimistic:minimally  Suicidal Thoughts:   No  Suicidal Plan/Intent:  No  Homicidal Thoughts:  absent  Orientation: oriented x 3  Memory: recent " intact    Lab Results (last 24 hours)     ** No results found for the last 24 hours. **           Imaging Results (Last 24 Hours)     ** No results found for the last 24 hours. **           ECG/EMG Results (most recent)     Procedure Component Value Units Date/Time    ECG 12 Lead [[id number]] Collected:  02/10/20 0124     Updated:  02/10/20 0129    Narrative:       Test Reason : Baseline Cardiac Status  Blood Pressure : **/** mmHG  Vent. Rate : 083 BPM     Atrial Rate : 083 BPM     P-R Int : 126 ms          QRS Dur : 090 ms      QT Int : 370 ms       P-R-T Axes : 061 062 045 degrees     QTc Int : 434 ms    Normal sinus rhythm  Normal ECG  When compared with ECG of 02-SEP-2019 04:13,  No significant change was found    Referred By:  NICOLE           Confirmed By:            ALLERGIES: Amoxicillin and Penicillins      Current Facility-Administered Medications:   •  acetaminophen (TYLENOL) tablet 650 mg, 650 mg, Oral, Q6H PRN, Barrera Burt MD  •  aluminum-magnesium hydroxide-simethicone (MAALOX MAX) 400-400-40 MG/5ML suspension 15 mL, 15 mL, Oral, Q6H PRN, Barrera Burt MD  •  ARIPiprazole (ABILIFY) tablet 5 mg, 5 mg, Oral, Daily, Carlton Lozada MD, 5 mg at 02/11/20 0818  •  benzonatate (TESSALON) capsule 100 mg, 100 mg, Oral, TID PRN, Barrera Burt MD  •  benztropine (COGENTIN) tablet 2 mg, 2 mg, Oral, Once PRN **OR** benztropine (COGENTIN) injection 1 mg, 1 mg, Intramuscular, Once PRN, Barrera Burt MD  •  famotidine (PEPCID) tablet 20 mg, 20 mg, Oral, BID PRN, Barrera Burt MD  •  hydrOXYzine (ATARAX) tablet 50 mg, 50 mg, Oral, Q6H PRN, Barrera Burt MD, 50 mg at 02/10/20 0139  •  ibuprofen (ADVIL,MOTRIN) tablet 400 mg, 400 mg, Oral, Q6H PRN, Barrera Burt MD  •  loperamide (IMODIUM) capsule 2 mg, 2 mg, Oral, Q2H PRN, Barrera Burt MD  •  magnesium hydroxide (MILK OF MAGNESIA) suspension 2400 mg/10mL 10 mL, 10 mL, Oral, Daily PRN, Barrera Burt MD  •  mirtazapine (REMERON) tablet 15 mg, 15 mg,  Oral, Nightly, Carlton Lozada MD, 15 mg at 02/10/20 2040  •  nicotine (NICODERM CQ) 21 MG/24HR patch 1 patch, 1 patch, Transdermal, Q24H, Barrera Burt MD  •  OLANZapine zydis (zyPREXA) disintegrating tablet 5 mg, 5 mg, Oral, TID PRN, Carlton Lozada MD  •  ondansetron (ZOFRAN) tablet 4 mg, 4 mg, Oral, Q6H PRN, Barrera Burt MD  •  sodium chloride nasal spray 2 spray, 2 spray, Each Nare, PRN, Barrera Burt MD    ASSESSMENT & PLAN      Methamphetamine abuse with induce psychotic disorder, unsp (CMS/HCC) Question of an underlying psychotic disorder.  Will temporarily utilize low-dose lorazepam for the patient's restlessness, started Abilify for the psychosis, individual group supportive psychotherapy    Methamphetamine abuse (CMS/[facility])  Will be recommending residential chemical dependency treatment    Borderline intellectual disability  Will incorporate into the psychotherapeutic approach    Tetrahydrocannabinol (THC) use disorder, mild, abuse  Once again we will be recommending residential chemical dependency treatment although the prospects for the patient to accept this is questionable.      Special precautions: Special Precautions Level 3 (q15 min checks)     Behavioral Health Treatment Plan and Problem List: I have reviewed and approved the Behavioral Health Treatment Plan and Problem list.    I spent a total of 25 minutes in direct patient care including 17 minutes face to face with the patient assessment, coordination of care, and counseling the patient on the current and follow-up treatment plans regarding psychosis and substance abuse.  Patient had no additional questions.    SHERLYN Lozada MD    Clinician:  Carlton Lozada MD  02/11/20  9:31 AM    Dictated utilizing Dragon dictation

## 2020-02-11 NOTE — PLAN OF CARE
Problem: Patient Care Overview  Goal: Interprofessional Rounds/Family Conf  Outcome: Ongoing (interventions implemented as appropriate)  Flowsheets (Taken 2/11/2020 2383)  Participants: family; patient; social work; psychiatrist  Summary: Spoke with patients father today.  Reviewed Dr. Lozada's assessment.  Attempted to meet with patient who was sleeping soundly this afternoon.  Note:   Spoke with patients father today who reported that patient has nothing to do all day, he reports that patient goes out and tries to make friends but apparently they all engage in substance abuse.  He reports that patient will take his medication and then when he is feeling better he will stop taking it, rationalizes that he doesn't need it anymore.  Discussed IM medications with patients father who reported that patient has been successful in the past with taking those medications however, the insurance company has previously charged the family $5.00 an injection.  Patients father reports that with the families fixed income they are unable to afford the $5.00.  Discussed the CDIOP at [facility] with patients father, he consented for patient to attend.  He reports that around 3 years ago patient was attending the TR program there and it was good or patient, he reports that patient just stopped going.  Patients father reports that the home is safeguarded and patient is to return to his home following stabilization.  Attempted to meet with patient this afternoon, however patient was sleeping soundly and would not wake up to meet with this therapist.

## 2020-02-11 NOTE — PLAN OF CARE
Problem: Patient Care Overview  Goal: Plan of Care Review  Outcome: Ongoing (interventions implemented as appropriate)  Flowsheets (Taken 2/11/2020 2522)  Progress: no change  Plan of Care Reviewed With: patient  Patient Agreement with Plan of Care: agrees  Outcome Summary: Patient calm and cooperative. Rates anxiety and depression both a 7. Denies SI/HI.

## 2020-02-12 VITALS
HEART RATE: 129 BPM | WEIGHT: 178.3 LBS | OXYGEN SATURATION: 96 % | DIASTOLIC BLOOD PRESSURE: 86 MMHG | TEMPERATURE: 97.1 F | HEIGHT: 68 IN | SYSTOLIC BLOOD PRESSURE: 127 MMHG | BODY MASS INDEX: 27.02 KG/M2 | RESPIRATION RATE: 18 BRPM

## 2020-02-12 PROCEDURE — 99239 HOSP IP/OBS DSCHRG MGMT >30: CPT | Performed by: PSYCHIATRY & NEUROLOGY

## 2020-02-12 RX ORDER — ARIPIPRAZOLE 5 MG/1
5 TABLET ORAL DAILY
Qty: 30 TABLET | Refills: 0 | Status: SHIPPED | OUTPATIENT
Start: 2020-02-13 | End: 2020-03-17 | Stop reason: HOSPADM

## 2020-02-12 RX ORDER — MIRTAZAPINE 15 MG/1
15 TABLET, FILM COATED ORAL NIGHTLY
Qty: 30 TABLET | Refills: 0 | Status: SHIPPED | OUTPATIENT
Start: 2020-02-12 | End: 2020-03-17 | Stop reason: HOSPADM

## 2020-02-12 RX ADMIN — NICOTINE 1 PATCH: 21 PATCH TRANSDERMAL at 08:27

## 2020-02-12 RX ADMIN — ARIPIPRAZOLE 5 MG: 10 TABLET ORAL at 08:27

## 2020-02-12 NOTE — PLAN OF CARE
Problem: Patient Care Overview  Goal: Plan of Care Review  Outcome: Ongoing (interventions implemented as appropriate)  Flowsheets (Taken 2/12/2020 1851)  Progress: improving  Plan of Care Reviewed With: patient  Patient Agreement with Plan of Care: agrees  Outcome Summary: Patient calm and cooperative. Rates anxiety a 3. Denies depression, SI, HI, or AVH.

## 2020-02-12 NOTE — PHARMACY PATIENT ASSISTANCE
Pharmacy looked into pricing for abilify maintena.  It is covered by his insurance and will have a $8.95 copay.      Thank you;  Deedee Cantrell RPH  02/12/20  9:30 AM

## 2020-02-12 NOTE — DISCHARGE SUMMARY
Date of Discharge:  2/12/2020    Discharge Diagnosis:Principal Problem:    Oth stimulant abuse (Methamphetamine) w stim-induce psychotic disorder, (CMS/HCC), provision, perhaps underlying Primary Psychotic Psychiatric Disorder.    Active Problems:    Methamphetamine abuse (CMS/HCC)    Borderline intellectual disability    Tetrahydrocannabinol (THC) use disorder, mild, abuse        Presenting Problem/History of Present Illness:Patient was admitted to the hospital on February 10 having presented depressed verbalizing suicidal intent, voluntarily admitted for safety evaluation and treatment, see admission note for details.         Hospital Course:  Patient was admitted for safety and stabilization and was placed on standard precautions.  Routine labs were checked.  Patient was assigned a masters level therapist and provided with an opportunity to participate in group and individual therapy on the unit.  Patient seen on a daily basis for evaluation and supportive therapy.  Patient's dysphoria and bizarre thought content cleared quite rapidly perhaps indicative of the etiology being directly related to the methamphetamine abuse as opposed to a primary psychiatric disorder.  Decided to go ahead however with the Depo Abilify Maintena based on the history of patient's having previously sustained a remission of flagrant psychotic symptomatology while taking monthly injections.  At discharge patient free of any thoughts of harming self or others and free of any psychotic thought content with a plan to follow-up at the [facility] substance abuse program [facility] clinic as well as for medications.  Patient's family involved, see therapist note from yesterday.  Attempt to have the Abilify Maintena precerted prior to discharge.  See below for medications.    Consults:   Consults     No orders found from 1/12/2020 to 2/11/2020.          Labs:  Lab Results (all)     None          Imaging:  Imaging Results (All)     None                   Condition on Discharge:  improved    Prognosis: FAIR    Vital Signs  Temp:  [97.2 °F (36.2 °C)] 97.2 °F (36.2 °C)  Heart Rate:  [105] 105  Resp:  [20] 20  BP: (129)/(78) 129/78    Discharge Disposition Home      Discharge Medications     Discharge Medications      ASK your doctor about these medications      Instructions Start Date   ARIPiprazole  MG prefilled syringe IM prefilled syringe  Commonly known as:  ABILIFY MAINTENA   400 mg, Intramuscular             Discharge Diet: Regular    Activity at Discharge: No restrictions    Follow-up Appointments: Patient to follow-up at the [place] office of the [facility] agency as well as follow-up there for medications.          Carlton Lozada MD  02/12/20  9:06 AM  Time spent with the discharge process >30 minutes.     Dictated utilizing Dragon dictation

## 2020-03-11 ENCOUNTER — HOSPITAL ENCOUNTER (INPATIENT)
Facility: HOSPITAL | Age: 35
LOS: 6 days | Discharge: HOME | End: 2020-03-17
Attending: PSYCHIATRY & NEUROLOGY | Admitting: PSYCHIATRY & NEUROLOGY

## 2020-03-11 ENCOUNTER — HOSPITAL ENCOUNTER (EMERGENCY)
Facility: HOSPITAL | Age: 35
Discharge: PSYCHIATRIC OR SUBSTANCE USE FACILITY | End: 2020-03-11
Attending: EMERGENCY MEDICINE | Admitting: EMERGENCY MEDICINE

## 2020-03-11 VITALS
BODY MASS INDEX: 22.9 KG/M2 | TEMPERATURE: 98.7 F | OXYGEN SATURATION: 96 % | DIASTOLIC BLOOD PRESSURE: 84 MMHG | HEART RATE: 92 BPM | SYSTOLIC BLOOD PRESSURE: 132 MMHG | RESPIRATION RATE: 18 BRPM | HEIGHT: 70 IN | WEIGHT: 160 LBS

## 2020-03-11 DIAGNOSIS — R45.851 SUICIDAL IDEATIONS: Primary | ICD-10-CM

## 2020-03-11 PROBLEM — F32.9 MDD (MAJOR DEPRESSIVE DISORDER): Status: ACTIVE | Noted: 2020-03-11

## 2020-03-11 LAB
ALBUMIN SERPL-MCNC: 4.48 G/DL (ref 3.5–5.2)
ALBUMIN/GLOB SERPL: 1.4 G/DL
ALP SERPL-CCNC: 98 U/L (ref 39–117)
ALT SERPL W P-5'-P-CCNC: 35 U/L (ref 1–41)
ANION GAP SERPL CALCULATED.3IONS-SCNC: 12.9 MMOL/L (ref 5–15)
AST SERPL-CCNC: 28 U/L (ref 1–40)
BASOPHILS # BLD AUTO: 0.03 10*3/MM3 (ref 0–0.2)
BASOPHILS NFR BLD AUTO: 0.3 % (ref 0–1.5)
BILIRUB SERPL-MCNC: 0.5 MG/DL (ref 0.2–1.2)
BILIRUB UR QL STRIP: NEGATIVE
BUN BLD-MCNC: 8 MG/DL (ref 6–20)
BUN/CREAT SERPL: 10.8 (ref 7–25)
CALCIUM SPEC-SCNC: 9.4 MG/DL (ref 8.6–10.5)
CHLORIDE SERPL-SCNC: 98 MMOL/L (ref 98–107)
CLARITY UR: CLEAR
CO2 SERPL-SCNC: 26.1 MMOL/L (ref 22–29)
COLOR UR: YELLOW
CREAT BLD-MCNC: 0.74 MG/DL (ref 0.76–1.27)
DEPRECATED RDW RBC AUTO: 44.2 FL (ref 37–54)
EOSINOPHIL # BLD AUTO: 0.04 10*3/MM3 (ref 0–0.4)
EOSINOPHIL NFR BLD AUTO: 0.3 % (ref 0.3–6.2)
ERYTHROCYTE [DISTWIDTH] IN BLOOD BY AUTOMATED COUNT: 13 % (ref 12.3–15.4)
ETHANOL BLD-MCNC: <10 MG/DL (ref 0–10)
ETHANOL UR QL: <0.01 %
GFR SERPL CREATININE-BSD FRML MDRD: 122 ML/MIN/1.73
GLOBULIN UR ELPH-MCNC: 3.2 GM/DL
GLUCOSE BLD-MCNC: 110 MG/DL (ref 65–99)
GLUCOSE UR STRIP-MCNC: NEGATIVE MG/DL
HCT VFR BLD AUTO: 46.9 % (ref 37.5–51)
HGB BLD-MCNC: 15.5 G/DL (ref 13–17.7)
HGB UR QL STRIP.AUTO: NEGATIVE
HOLD SPECIMEN: NORMAL
HOLD SPECIMEN: NORMAL
IMM GRANULOCYTES # BLD AUTO: 0.02 10*3/MM3 (ref 0–0.05)
IMM GRANULOCYTES NFR BLD AUTO: 0.2 % (ref 0–0.5)
KETONES UR QL STRIP: NEGATIVE
LEUKOCYTE ESTERASE UR QL STRIP.AUTO: NEGATIVE
LYMPHOCYTES # BLD AUTO: 3.44 10*3/MM3 (ref 0.7–3.1)
LYMPHOCYTES NFR BLD AUTO: 28.9 % (ref 19.6–45.3)
MAGNESIUM SERPL-MCNC: 2 MG/DL (ref 1.6–2.6)
MCH RBC QN AUTO: 30.3 PG (ref 26.6–33)
MCHC RBC AUTO-ENTMCNC: 33 G/DL (ref 31.5–35.7)
MCV RBC AUTO: 91.8 FL (ref 79–97)
MONOCYTES # BLD AUTO: 0.53 10*3/MM3 (ref 0.1–0.9)
MONOCYTES NFR BLD AUTO: 4.5 % (ref 5–12)
NEUTROPHILS # BLD AUTO: 7.84 10*3/MM3 (ref 1.7–7)
NEUTROPHILS NFR BLD AUTO: 65.8 % (ref 42.7–76)
NITRITE UR QL STRIP: NEGATIVE
NRBC BLD AUTO-RTO: 0 /100 WBC (ref 0–0.2)
PH UR STRIP.AUTO: 5.5 [PH] (ref 5–8)
PLATELET # BLD AUTO: 243 10*3/MM3 (ref 140–450)
PMV BLD AUTO: 10.9 FL (ref 6–12)
POTASSIUM BLD-SCNC: 3.4 MMOL/L (ref 3.5–5.2)
PROT SERPL-MCNC: 7.7 G/DL (ref 6–8.5)
PROT UR QL STRIP: NEGATIVE
RBC # BLD AUTO: 5.11 10*6/MM3 (ref 4.14–5.8)
SODIUM BLD-SCNC: 137 MMOL/L (ref 136–145)
SP GR UR STRIP: 1.01 (ref 1–1.03)
UROBILINOGEN UR QL STRIP: NORMAL
WBC NRBC COR # BLD: 11.9 10*3/MM3 (ref 3.4–10.8)
WHOLE BLOOD HOLD SPECIMEN: NORMAL
WHOLE BLOOD HOLD SPECIMEN: NORMAL

## 2020-03-11 PROCEDURE — 80307 DRUG TEST PRSMV CHEM ANLYZR: CPT | Performed by: PHYSICIAN ASSISTANT

## 2020-03-11 PROCEDURE — 93010 ELECTROCARDIOGRAM REPORT: CPT | Performed by: INTERNAL MEDICINE

## 2020-03-11 PROCEDURE — 93005 ELECTROCARDIOGRAM TRACING: CPT | Performed by: PSYCHIATRY & NEUROLOGY

## 2020-03-11 PROCEDURE — 99284 EMERGENCY DEPT VISIT MOD MDM: CPT

## 2020-03-11 PROCEDURE — 85025 COMPLETE CBC W/AUTO DIFF WBC: CPT | Performed by: PHYSICIAN ASSISTANT

## 2020-03-11 PROCEDURE — 83735 ASSAY OF MAGNESIUM: CPT | Performed by: PHYSICIAN ASSISTANT

## 2020-03-11 PROCEDURE — 81003 URINALYSIS AUTO W/O SCOPE: CPT | Performed by: PHYSICIAN ASSISTANT

## 2020-03-11 PROCEDURE — 80053 COMPREHEN METABOLIC PANEL: CPT | Performed by: PHYSICIAN ASSISTANT

## 2020-03-11 RX ORDER — ONDANSETRON 4 MG/1
4 TABLET, FILM COATED ORAL EVERY 6 HOURS PRN
Status: DISCONTINUED | OUTPATIENT
Start: 2020-03-11 | End: 2020-03-17 | Stop reason: HOSPADM

## 2020-03-11 RX ORDER — BENZONATATE 100 MG/1
100 CAPSULE ORAL 3 TIMES DAILY PRN
Status: DISCONTINUED | OUTPATIENT
Start: 2020-03-11 | End: 2020-03-17 | Stop reason: HOSPADM

## 2020-03-11 RX ORDER — NICOTINE 21 MG/24HR
1 PATCH, TRANSDERMAL 24 HOURS TRANSDERMAL
Status: DISCONTINUED | OUTPATIENT
Start: 2020-03-12 | End: 2020-03-17 | Stop reason: HOSPADM

## 2020-03-11 RX ORDER — ECHINACEA PURPUREA EXTRACT 125 MG
2 TABLET ORAL AS NEEDED
Status: DISCONTINUED | OUTPATIENT
Start: 2020-03-11 | End: 2020-03-17 | Stop reason: HOSPADM

## 2020-03-11 RX ORDER — IBUPROFEN 400 MG/1
400 TABLET ORAL EVERY 6 HOURS PRN
Status: DISCONTINUED | OUTPATIENT
Start: 2020-03-11 | End: 2020-03-17 | Stop reason: HOSPADM

## 2020-03-11 RX ORDER — BENZTROPINE MESYLATE 1 MG/1
2 TABLET ORAL ONCE AS NEEDED
Status: DISCONTINUED | OUTPATIENT
Start: 2020-03-11 | End: 2020-03-13

## 2020-03-11 RX ORDER — ACETAMINOPHEN 325 MG/1
650 TABLET ORAL EVERY 6 HOURS PRN
Status: DISCONTINUED | OUTPATIENT
Start: 2020-03-11 | End: 2020-03-13

## 2020-03-11 RX ORDER — BENZTROPINE MESYLATE 1 MG/ML
1 INJECTION INTRAMUSCULAR; INTRAVENOUS ONCE AS NEEDED
Status: DISCONTINUED | OUTPATIENT
Start: 2020-03-11 | End: 2020-03-13

## 2020-03-11 RX ORDER — FAMOTIDINE 20 MG/1
20 TABLET, FILM COATED ORAL 2 TIMES DAILY PRN
Status: DISCONTINUED | OUTPATIENT
Start: 2020-03-11 | End: 2020-03-17 | Stop reason: HOSPADM

## 2020-03-11 RX ORDER — ALUMINA, MAGNESIA, AND SIMETHICONE 2400; 2400; 240 MG/30ML; MG/30ML; MG/30ML
15 SUSPENSION ORAL EVERY 6 HOURS PRN
Status: DISCONTINUED | OUTPATIENT
Start: 2020-03-11 | End: 2020-03-17 | Stop reason: HOSPADM

## 2020-03-11 RX ORDER — TRAZODONE HYDROCHLORIDE 50 MG/1
50 TABLET ORAL NIGHTLY PRN
Status: DISCONTINUED | OUTPATIENT
Start: 2020-03-11 | End: 2020-03-17 | Stop reason: HOSPADM

## 2020-03-11 RX ORDER — LOPERAMIDE HYDROCHLORIDE 2 MG/1
2 CAPSULE ORAL
Status: DISCONTINUED | OUTPATIENT
Start: 2020-03-11 | End: 2020-03-17 | Stop reason: HOSPADM

## 2020-03-11 RX ORDER — HYDROXYZINE 50 MG/1
50 TABLET, FILM COATED ORAL EVERY 6 HOURS PRN
Status: DISCONTINUED | OUTPATIENT
Start: 2020-03-11 | End: 2020-03-13

## 2020-03-11 RX ORDER — POTASSIUM CHLORIDE 20 MEQ/1
20 TABLET, EXTENDED RELEASE ORAL ONCE
Status: DISCONTINUED | OUTPATIENT
Start: 2020-03-11 | End: 2020-03-11 | Stop reason: HOSPADM
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
## 2020-03-11 NOTE — NURSING NOTE
Pt searched prior to my arrival by Jaylen Travis.at 1830 Patient pockets emptied. Search completed with two staff members present. Items logged and placed in cabinet in intake area. Pt placed in safe room for evaluation. Will continue to monitor pt status. Waiting for ED provider to clear pt medically

## 2020-03-11 NOTE — NURSING NOTE
"Pt presents to Er SI no plan.  Pt states \" I would just do it no one care for me, my dad said he could not afford my Wellbutrin that we had to buy groceries.\"  Pt patria HI. Rates anxiety 10 depression 10  Sleep and appetite poor.  Pt cooperative during assessment.  Responding to internal stimuli during assessment. Pt reports visual sandra\"  Sleepy chair walking.\"   Pt reports Meth use yesterday unsure of amount, \" not every day just when I can get it.\"  Intake process explained pt agreeable pt placed in treatment room will continue to monitor for safety.      "

## 2020-03-12 PROBLEM — F33.8 OTHER RECURRENT DEPRESSIVE DISORDERS (HCC): Status: ACTIVE | Noted: 2020-03-12

## 2020-03-12 LAB
HIV1+2 AB SER QL: NORMAL
POTASSIUM BLD-SCNC: 4.2 MMOL/L (ref 3.5–5.2)

## 2020-03-12 PROCEDURE — 99223 1ST HOSP IP/OBS HIGH 75: CPT | Performed by: PSYCHIATRY & NEUROLOGY

## 2020-03-12 PROCEDURE — G0432 EIA HIV-1/HIV-2 SCREEN: HCPCS | Performed by: PSYCHIATRY & NEUROLOGY

## 2020-03-12 PROCEDURE — 84132 ASSAY OF SERUM POTASSIUM: CPT | Performed by: PSYCHIATRY & NEUROLOGY

## 2020-03-12 RX ORDER — OLANZAPINE 5 MG/1
5 TABLET, ORALLY DISINTEGRATING ORAL 3 TIMES DAILY PRN
Status: DISCONTINUED | OUTPATIENT
Start: 2020-03-12 | End: 2020-03-17 | Stop reason: HOSPADM

## 2020-03-12 RX ORDER — IBUPROFEN 800 MG/1
800 TABLET ORAL EVERY 8 HOURS PRN
COMMUNITY
End: 2020-03-17 | Stop reason: HOSPADM

## 2020-03-12 RX ORDER — MIRTAZAPINE 15 MG/1
15 TABLET, FILM COATED ORAL NIGHTLY
Status: CANCELLED | OUTPATIENT
Start: 2020-03-12

## 2020-03-12 RX ORDER — OXYCODONE HYDROCHLORIDE AND ACETAMINOPHEN 5; 325 MG/1; MG/1
1 TABLET ORAL EVERY 6 HOURS PRN
Status: CANCELLED | OUTPATIENT
Start: 2020-03-12

## 2020-03-12 RX ORDER — METHOCARBAMOL 750 MG/1
750 TABLET, FILM COATED ORAL 3 TIMES DAILY PRN
Status: CANCELLED | OUTPATIENT
Start: 2020-03-12

## 2020-03-12 RX ORDER — METHOCARBAMOL 750 MG/1
750 TABLET, FILM COATED ORAL 3 TIMES DAILY PRN
COMMUNITY
End: 2020-03-17 | Stop reason: HOSPADM

## 2020-03-12 RX ORDER — BUPROPION HYDROCHLORIDE 300 MG/1
300 TABLET ORAL DAILY
COMMUNITY
End: 2020-03-17 | Stop reason: HOSPADM

## 2020-03-12 RX ORDER — IBUPROFEN 400 MG/1
800 TABLET ORAL EVERY 8 HOURS PRN
Status: CANCELLED | OUTPATIENT
Start: 2020-03-12

## 2020-03-12 RX ORDER — BUPROPION HYDROCHLORIDE 150 MG/1
300 TABLET ORAL DAILY
Status: CANCELLED | OUTPATIENT
Start: 2020-03-12

## 2020-03-12 RX ORDER — ARIPIPRAZOLE 10 MG/1
5 TABLET ORAL DAILY
Status: CANCELLED | OUTPATIENT
Start: 2020-03-12

## 2020-03-12 RX ORDER — OLANZAPINE 5 MG/1
5 TABLET, ORALLY DISINTEGRATING ORAL DAILY
Status: DISCONTINUED | OUTPATIENT
Start: 2020-03-12 | End: 2020-03-17 | Stop reason: HOSPADM

## 2020-03-12 RX ORDER — OXYCODONE HYDROCHLORIDE AND ACETAMINOPHEN 5; 325 MG/1; MG/1
1 TABLET ORAL EVERY 6 HOURS PRN
COMMUNITY
End: 2020-03-17 | Stop reason: HOSPADM

## 2020-03-12 RX ADMIN — OLANZAPINE 5 MG: 5 TABLET, ORALLY DISINTEGRATING ORAL at 13:06
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
## 2020-03-12 NOTE — ED NOTES
Updated number for pt father: Aric Mims Sr. [phone] is father's home number     Divya Stapleton  03/11/20 213

## 2020-03-12 NOTE — NURSING NOTE
Patient reports suicidal thoughts to stab self, he reports feeling depressed, no one cares about him. He lives with brother and father, Aric Mims, who is the patient POA. She reports smoking meth daily with last use yesterday. He was recently in treatment 2/10-2/12/20. Anxiety rated 5/10, depression rated 3/10. He denies craving or w/d sx at this time. Appetite is fair and sleep has been poor.

## 2020-03-12 NOTE — PROGRESS NOTES
1640  Therapist attempted to contact [facility] this date to discuss services regarding day training or TR program; office is closed at this time. Therapist efforts will be ongoing.

## 2020-03-12 NOTE — NURSING NOTE
[facility] Rn contacted at this time for chart review and request of bed assignment, Bed assigned to 44b

## 2020-03-12 NOTE — NURSING NOTE
Patient being transferred to [facility], report given to Yahir Perez RN, all personal belongings/information sent with patient.  Staff and security accompanied patient.

## 2020-03-12 NOTE — NURSING NOTE
Spoke to   via phone. Intake information provided. Instructed to admit the patient. Admit orders received. SP3 routine orders, private room, recheck potassium in am RBVOx2.. Patient and ed provider made aware of plan of care. Safety precautions maintained.

## 2020-03-12 NOTE — PLAN OF CARE
Problem: Patient Care Overview  Goal: Plan of Care Review  Outcome: Ongoing (interventions implemented as appropriate)  Flowsheets (Taken 3/12/2020 1416)  Progress: improving  Plan of Care Reviewed With: patient  Patient Agreement with Plan of Care: agrees  Note:   Patient has been out of room some, sleeping in room most of the time.  Anxiety/depression 10, denies S/I, H/I & A/V/H ideations.

## 2020-03-12 NOTE — PLAN OF CARE
Problem: Patient Care Overview  Goal: Plan of Care Review  Outcome: Ongoing (interventions implemented as appropriate)  Flowsheets (Taken 3/12/2020 1405)  Consent Given to Review Plan with: DEMETRIA and Aric Mims (father who is also guardian)  Progress: no change  Plan of Care Reviewed With: patient  Patient Agreement with Plan of Care: agrees  Outcome Summary: Therapist met with Patient to complete initial assessment, social history, care plan, and aftercare recommendation; Patient is agreeable.  Goal: Individualization and Mutuality  Outcome: Ongoing (interventions implemented as appropriate)  Flowsheets  Taken 3/12/2020 1359 by Jennie Blackburn CSW  Patient Personal Strengths: stable living environment;tolerant;spiritual/Denominational support;socioeconomic stability;self-reliant;realistic evaluation of current/future capabilities;resilient;resourceful;self-awareness;family/social support  Patient Vulnerabilities: lifestyle of addiction, cognitively disabled, ineffective coping, grief and loss   Taken 3/12/2020 1406 by Jennie Blackburn CSW  Patient Specific Goals (Include Timeframe): Identify 1-2 healthy coping methods, deny SI/HI, complete safety planning, and complete aftercare planning prior to discharge.  Patient Specific Interventions: Therapsit to offer 1-4 therapy sessions, daily group, family education, aftercare planning, safety planning, and brief CBT interventions.  What Anxieties, Fears, Concerns, or Questions Do You Have About Your Care?: None verbalized  Patient Specific Preferences: None verbalized  Taken 3/11/2020 2342 by Kary Clayton RN  What Information Would Help Us Give You More Personalized Care?: Father, Aric Mims reports he is not the guardian but pt's POA  Goal: Discharge Needs Assessment  Outcome: Ongoing (interventions implemented as appropriate)  Flowsheets  Taken 3/12/2020 1406 by Jennie Blackburn CSW  Outpatient/Agency/Support Group Needs: outpatient counseling;outpatient  medication management;support group(s) (specify)  Discharge Coordination/Progress: Therapsit met with Patient to complete discharge needs assessment; Patient agreeable. Therapist discussed needs further with Patient's father Aric; consent for [facility] to address additional services such as TR program.  Transportation Anticipated: family or friend will provide  Anticipated Discharge Disposition: home or self-care  Transportation Concerns: car, none  Current Discharge Risk: psychiatric illness;substance use/abuse  Concerns to be Addressed: suicidal;mental health;medication;coping/stress;compliance issue;decision making;discharge planning;developmental;cognitive/perceptual;substance/tobacco abuse/use  Readmission Within the Last 30 Days: no previous admission in last 30 days  Patient/Family Anticipates Transition to: home with family  Taken 3/11/2020 2342 by Kary Clayton RN  Patient/Family Anticipated Services at Transition: outpatient care;mental health services  Goal: Interprofessional Rounds/Family Conf  Outcome: Ongoing (interventions implemented as appropriate)  Flowsheets (Taken 3/12/2020 1406)  Participants: psychiatrist; social work; nursing; milieu/psych techs; patient; family; other (see comments) ( Navigator)  Summary: Treatment Team Evalautions and Staffing     Problem: Overarching Goals (Adult)  Goal: Optimized Coping Skills in Response to Life Stressors  Outcome: Ongoing (interventions implemented as appropriate)  Flowsheets (Taken 3/12/2020 1406)  Optimized Coping Skills in Response to Life Stressors: making progress toward outcome  Intervention: Promote Effective Coping Strategies  Flowsheets (Taken 3/12/2020 1406)  Supportive Measures: active listening utilized; decision-making supported; self-care encouraged; verbalization of feelings encouraged; positive reinforcement provided; self-reflection promoted; self-responsibility promoted; counseling provided; problem solving facilitated; relaxation  techniques promoted  Goal: Develops/Participates in Therapeutic [place] to Support Successful Transition  Outcome: Ongoing (interventions implemented as appropriate)  Flowsheets (Taken 3/12/2020 1406)  Develops/Participates in Therapeutic [place] to Support Successful Transition: making progress toward outcome  Intervention: Foster Therapeutic [place]  Flowsheets (Taken 3/12/2020 1406)  Trust Relationship/Rapport: care explained; choices provided; thoughts/feelings acknowledged; emotional support provided; empathic listening provided; questions answered; questions encouraged; reassurance provided  Intervention: Mutually Develop Transition Plan  Flowsheets (Taken 3/12/2020 1406)  Transition Support: community resources reviewed; follow-up care discussed; crisis management plan promoted     9301  DATA:    Therapist met individually with Patient this date for initial evaluation.  Introduced self as Therapist and the role of a positive therapeutic relationship; Patient agreeable.    Therapist encouraged Patient to speak openly and honestly about any issues or stressors during treatment stay. Therapist explained how open communication is significant to providing most effective care. Therapist completed psychosocial assessment, integrated summary, reviewed care plans, disposition planning and discussed hospitalization expectations and treatment goals this date.     Therapist provided education regarding different levels of care and is recommending residential for most appropriate aftercare. Patient not agreeable. Therapist is recommending family involvement prior to discharge and it's importance. Patient agreeable and signed consent for father Aric Mims.     9810  Therapist contacted Patient's father Aric Mims at [phone] who is Patient's emergency limited guardian for managing personal affairs and financial resources of Patient appointed in 2019; documentation reviewed through medical record. Aric  discussed Patient's long history of substance abuse and how lifestyle of addiction worsened after the death of Patient's mother and girlfriend. Patient present with girlfriend when she passed related to drug overdose. Aric reports Patient's history of noncompliance with medication. Aric states that Patient returns home and will then refuse medication due to substance abuse. Aric states that Patient will leave the home early morning hours as well as at night to sell household items including pieces of clothing for drugs. Aric states that Patient does not have any supportive sober peers and appears to be taken advantage by others. Aric states that prior to current hospitalization Patient destroyed his room busting holes in the wall, broke glasses, shattered plates, busted TV screen, spit on brother, spit on self, and flipped mattresses over. Aric states that Patient has a history of suicide attempts via cut and overdose. Aric states whenever Patient's behavior appeared more aggressive that he encouraged Patient to attend treatment due to Patient's voiced beliefs that no person loves him and desires to end life via stab. Aric reports that he removed all steak knives that were found under Patient's mattress.     Aric reported a need for a statement by Dr. Lozada that confirmed Patient could not make informed decisions for self due to court hearing coming up on the 18th for permeant guardianship.     Aric reports the need for assistance in efforts to find Patient a placement that would secure him and prevent him from using drugs; Therapist addressed this desire. Therapist explained that a [facility] nor residential treatment could prevent Patient from leaving facility that they could notify him and local law enforcement. Aric expressed understanding and discussed the need for Patient to build healthy sober relationships with others his age. Therapist suggested TR program; Aric states previous treatment with [facility] TR  "program but was informed that Patient \"reached his limit\" there. Aric provide verbal consent for [facility] at this time. Therapist to contact in efforts to review resources.     CLINICAL MANUVERING/INTERVENTIONS:  Assisted Patient in processing session content; acknowledged and normalized Patient’s thoughts, feelings, and concerns by utilizing a person-centered approach in efforts to build appropriate rapport and a positive therapeutic relationship with open and honest communication. Allowed Patient to ventilate regarding current stressors and triggers for negative emotions and thoughts in a safe nonjudgmental environment with unconditional positive regard, active listening skills, and empathy.     ASSESSMENT:   Mr. Cruz Mims is a 35 year old, single, 5th grade educated, , disability recipient, male that currently resides with his father who is guardian and brother. Patient presented to treatment due to SI with plans to stab self. Patient reports continued drug use and denies complying with aftercare. Patient has a long history of inpatient treatment; most recently treated at this facility last month with discharge diagnosis of Other stimulant abuse (Methamphetamine) w stim-induce psychotic disorder, provision, perhaps underlying Primary Psychotic Psychiatric Disorder and active problems of Methamphetamine abuse, Borderline intellectual disability, and Tetrahydrocannabinol (THC) use disorder, mild, abuse.    PLAN:   Patient will receive 24/7 nursing monitoring and daily psychiatrist evaluation by a multidisciplinary team.    Patient will continue stabilization at this time.     Patient is agreeable for outpatient services with [facility]. Therapist to contact to review possible TR services.     Public assistance with transportation will not be needed. Family member will provide.     "

## 2020-03-12 NOTE — NURSING NOTE
Pt. Transferred from psych 2. pt. Is Calm oriented to unit he requests to go to bed . Pt.refused dinner .

## 2020-03-12 NOTE — NURSING NOTE
"Spoke with Aric Mims at this time to obtain verbal consent for admission and treatment.  Aric states \" I am Hunter's POA over his money only,  He can sign himself for admission consent .\"  registration called at this time verified no guardian papers available and Aric declined when pt was initially registered as well.    "

## 2020-03-13 ENCOUNTER — APPOINTMENT (OUTPATIENT)
Dept: GENERAL RADIOLOGY | Facility: HOSPITAL | Age: 35
End: 2020-03-13

## 2020-03-13 PROBLEM — F90.9 ADHD (ATTENTION DEFICIT HYPERACTIVITY DISORDER): Status: ACTIVE | Noted: 2020-03-13

## 2020-03-13 LAB
6-ACETYL MORPHINE: NEGATIVE
AMPHET+METHAMPHET UR QL: POSITIVE
BARBITURATES UR QL SCN: NEGATIVE
BENZODIAZ UR QL SCN: NEGATIVE
BUPRENORPHINE SERPL-MCNC: NEGATIVE NG/ML
CANNABINOIDS SERPL QL: POSITIVE
COCAINE UR QL: NEGATIVE
HAV IGM SERPL QL IA: ABNORMAL
HBV CORE IGM SERPL QL IA: ABNORMAL
HBV SURFACE AG SERPL QL IA: ABNORMAL
HCV AB SER DONR QL: REACTIVE
HOLD SPECIMEN: NORMAL
METHADONE UR QL SCN: NEGATIVE
OPIATES UR QL: NEGATIVE
OXYCODONE UR QL SCN: NEGATIVE
PCP UR QL SCN: NEGATIVE

## 2020-03-13 PROCEDURE — 99232 SBSQ HOSP IP/OBS MODERATE 35: CPT | Performed by: PSYCHIATRY & NEUROLOGY

## 2020-03-13 PROCEDURE — 72110 X-RAY EXAM L-2 SPINE 4/>VWS: CPT | Performed by: RADIOLOGY

## 2020-03-13 PROCEDURE — 80074 ACUTE HEPATITIS PANEL: CPT | Performed by: PSYCHIATRY & NEUROLOGY

## 2020-03-13 PROCEDURE — 72110 X-RAY EXAM L-2 SPINE 4/>VWS: CPT

## 2020-03-13 RX ADMIN — NICOTINE 1 PATCH: 21 PATCH TRANSDERMAL at 08:12

## 2020-03-13 RX ADMIN — OLANZAPINE 5 MG: 5 TABLET, ORALLY DISINTEGRATING ORAL at 08:13

## 2020-03-13 NOTE — PLAN OF CARE
"  Problem: Patient Care Overview  Goal: Interprofessional Rounds/Family Conf  Outcome: Ongoing (interventions implemented as appropriate)  Flowsheets  Taken 3/13/2020 1426  Participants: patient;psychiatrist  Taken 3/12/2020 1406  Summary: Treatment Team Evalautions and Staffing     0920  Therapist staffed case with Dr. Lozada this date. Reed's Law discussed as an alternative plan rather than permanent guardianship. Therapist to discuss this recommendation further with Patient's father Aric.       Problem: Patient Care Overview  Goal: Plan of Care Review  Outcome: Ongoing (interventions implemented as appropriate)  Flowsheets  Taken 3/12/2020 1405 by Jennie Blackburn CSW  Consent Given to Review Plan with: CR and Aric Mims (father who is also guardian)  Taken 3/13/2020 1428 by Jennie Blackburn CSW  Progress: no change  Taken 3/13/2020 1416 by Malik Gore RN  Plan of Care Reviewed With: patient  Patient Agreement with Plan of Care: agrees     1420  Therapist met with Patient at bedside this date. Therapist introduced self from yesterday's initial assessment. Patient denies any improvements today. Therapist encouraged Patient to begin aftercare and disposition planning; Patient not interested in residential treatment nor aftercare stating that his plans are to \"return to the house\". Therapist discussed dangers of relapse and possible triggers when returning to same community at this time Patient than proceeded to close his eyes as if falling asleep. Therapist again encouraged Patient to complete therapy session in efforts to address aftercare and treatment progress however Patient continued to be evasive offering little to no insight to thought content and mood.       " none

## 2020-03-13 NOTE — PROGRESS NOTES
1413  [facility]   [phone]    Therapist spoke with Lilian who is coordinator over TR and day programs at [facility]. Lilian familiar with this Patient and reports that Patient has a history of noncompliance. Lilian states that she would schedule [facility] to provide transportation however Patient would continue to refuse to attend. Lilian states that she is familiar with Patient's community and believes that if Patient remains in area that the cycle of addiction will continue. Lilian expressed that Patient has a great personality with positive characteristics however the substance abuse has created multiple negative impacts on his life.

## 2020-03-13 NOTE — PROGRESS NOTES
0195   Therapist successfully contacted Patient's father Aric at [phone]. Therapist discussed recommendations of Reed's Law rather than guardianship and provided education. Aric agreeable to file petition and plans on doing so Monday morning. Therapist encouraged Aric to present documents to Treatment Team as soon as possible in efforts to assist with referral process. Therapist also informed Aric that if any additional assistance regarding application is needed to contact Therapist to assist. Aric reports plans on attending visitation this weekend; Therapist will provide additional literature regarding Reed's Law to covering therapist in efforts to provide additional education regarding this service.

## 2020-03-13 NOTE — PLAN OF CARE
Problem: Patient Care Overview  Goal: Plan of Care Review  Outcome: Ongoing (interventions implemented as appropriate)  Flowsheets (Taken 3/13/2020 1416)  Progress: improving  Plan of Care Reviewed With: patient  Patient Agreement with Plan of Care: agrees  Note:   Patient mainly in room today, rambling at times, did not answer some assessment questions.  Has been educated for pain meds r/t newly dx Fx L2.

## 2020-03-13 NOTE — PLAN OF CARE
Problem: Patient Care Overview  Goal: Plan of Care Review  Outcome: Ongoing (interventions implemented as appropriate)  Flowsheets  Taken 3/13/2020 0521  Progress: no change  Outcome Summary: Pt isolates to room. Rates anxiety depression 5/10. Denies SI HI AVH.  Taken 3/12/2020 2035  Plan of Care Reviewed With: patient  Patient Agreement with Plan of Care: agrees

## 2020-03-13 NOTE — PROGRESS NOTES
"INPATIENT PSYCHIATRIC PROGRESS NOTE    Name:  Cruz Mims  :  1985  MRN:  [id number]  Visit Number:  [id number]  Length of stay:  2    Behavioral Health Treatment Plan and Problem List: I have reviewed and approved the Behavioral Health Treatment Plan and Problem list.    SUBJECTIVE    CC/ Focus of exam: psychosis/ substance abuse.     Patient's subjective status: \"Doing better\".     INTERVAL HISTORY: psychotic symptoms clearing rapidly as has in the past.  Reviewed the therapists note from her interviewing the patient's father-I understand the father wants to pursue guardianship and there is a court hearing scheduled for .    On rapid approximate intelligent testing he stands in 85% probability of IQ less than 90.    From my contact with the patient my impression is that his intellectual function is in the borderline impaired range, that he is impulsive and essentially does what he wants including the drug use and abuse that precipitates recurrent transient episodes of psychosis due to methamphetamine.  His activities are very difficult to contain, he has no apparent interest in positive change.  Does he have the capacity, the ability, to change his ways?  It would not seem so, he certainly has no apparent motivation to do so.  His father is payee on the patient's Social Security check and apparently also has emergency guardianship status.  Will submit the above statement for the courts consideration.    Patient's insistence on resumption of bupropion is suggestive that he has possibly been abusing this medication also.  Do not see an indication given the fact that he is denies being depressed.  Perhaps it is also been prescribed for ADHD symptomatology.    Discussed the case with patient's therapist, would seem that application of Reed's law would be most appropriate in this case rather than guardianship, his therapist is to discuss this with the patient's father.    Depression rating says " not  Anxiety rating says not   sleep: Slept well  Withdrawal sx: None  Craving: Says not     Review of Systems   Respiratory: Negative.    Cardiovascular: Negative.    Gastrointestinal: Negative.    Neurological: Negative.      Radiological exam today revealed a compression fracture of L2, patient has a history of a fall and back injury perhaps 3 to 4 weeks prior to admission, will allow patient as needed ibuprofen for his pain complaint.  No opiates.    OBJECTIVE    Temp:  [97 °F (36.1 °C)-98.9 °F (37.2 °C)] 97 °F (36.1 °C)  Heart Rate:  [] 117  Resp:  [18-20] 20  BP: (108-115)/(58-69) 108/63    MENTAL STATUS EXAM:      Appearance:Casually dressed, good hygeine.   Cooperation:Cooperative  Psychomotor: No psychomotor agitation/retardation, No EPS, No motor tics  Speech-normal rate, amount.   Mood/Affect:  Restricted  Thought Processes:  coherent  Thought Content:  distorted  Hallucination(s): none  Hopelessness: No  Optimistic:minimally  Suicidal Thoughts:   No  Suicidal Plan/Intent:  No  Homicidal Thoughts:  absent  Orientation: oriented x 3  Memory: recent intact    Lab Results (last 24 hours)     Procedure Component Value Units Date/Time    HIV-1 & HIV-2 Antibodies [[id number]] Collected:  03/12/20 1644    Specimen:  Blood Updated:  03/12/20 1734    Narrative:       The following orders were created for panel order HIV-1 & HIV-2 Antibodies.  Procedure                               Abnormality         Status                     ---------                               -----------         ------                     HIV-1 / O / 2 Ag / Antib...[[id number]]  Normal              Final result                 Please view results for these tests on the individual orders.    HIV-1 / O / 2 Ag / Antibody 4th Generation [[id number]]  (Normal) Collected:  03/12/20 1644    Specimen:  Blood Updated:  03/12/20 1734     HIV-1/ HIV-2 Non-Reactive     Comment: A non-reactive test result does not preclude the possibility of  exposure to HIV or infection with HIV. An antibody response to recent exposure may take several months to reach detectable levels.       Narrative:       The HIV antibody/antigen combo assay is a qualitative assay for HIV that includes the p24 antigen as well as antibodies to HIV types 1 and 2. This test is intended to be used as a screening assay in the diagnosis of HIV infection in patients over the age of 2.  Results may be falsely decreased if patient taking Biotin.      Potassium [[id number]]  (Normal) Collected:  03/12/20 0754    Specimen:  Blood Updated:  03/12/20 0841     Potassium 4.2 mmol/L            Imaging Results (Last 24 Hours)     ** No results found for the last 24 hours. **           ECG/EMG Results (most recent)     Procedure Component Value Units Date/Time    ECG 12 Lead [[id number]] Collected:  03/11/20 2239     Updated:  03/12/20 1109    Narrative:       Test Reason : Baseline Cardiac Status  Blood Pressure : **/** mmHG  Vent. Rate : 092 BPM     Atrial Rate : 092 BPM     P-R Int : 122 ms          QRS Dur : 092 ms      QT Int : 352 ms       P-R-T Axes : 059 059 041 degrees     QTc Int : 435 ms    Normal sinus rhythm  Normal ECG  When compared with ECG of 10-FEB-2020 01:24,  No significant change was found  Confirmed by Saman Zhou (2020) on 3/12/2020 11:09:40 AM    Referred By:  EVANS           Confirmed By:Saman Zhou           ALLERGIES: Amoxicillin and Penicillins      Current Facility-Administered Medications:   •  acetaminophen (TYLENOL) tablet 650 mg, 650 mg, Oral, Q6H PRN, Vadim Lozada MD  •  aluminum-magnesium hydroxide-simethicone (MAALOX MAX) 400-400-40 MG/5ML suspension 15 mL, 15 mL, Oral, Q6H PRN, Vadim Lozada MD  •  benzonatate (TESSALON) capsule 100 mg, 100 mg, Oral, TID PRN, Vadim Lozada MD  •  benztropine (COGENTIN) tablet 2 mg, 2 mg, Oral, Once PRN **OR** benztropine (COGENTIN) injection 1 mg, 1 mg, Intramuscular, Once PRN, Vadim Lozada MD  •   famotidine (PEPCID) tablet 20 mg, 20 mg, Oral, BID PRN, Vadim Lozada MD  •  hydrOXYzine (ATARAX) tablet 50 mg, 50 mg, Oral, Q6H PRN, Vadim Lozada MD  •  ibuprofen (ADVIL,MOTRIN) tablet 400 mg, 400 mg, Oral, Q6H PRN, Vadim Lozada MD  •  loperamide (IMODIUM) capsule 2 mg, 2 mg, Oral, Q2H PRN, Vadim Lozada MD  •  magnesium hydroxide (MILK OF MAGNESIA) suspension 2400 mg/10mL 10 mL, 10 mL, Oral, Daily PRN, Vadim Lozada MD  •  nicotine (NICODERM CQ) 21 MG/24HR patch 1 patch, 1 patch, Transdermal, Q24H, Vadim Lozada MD  •  OLANZapine zydis (zyPREXA) disintegrating tablet 5 mg, 5 mg, Oral, Daily, Carlton Lozada MD, 5 mg at 03/12/20 1306  •  OLANZapine zydis (zyPREXA) disintegrating tablet 5 mg, 5 mg, Oral, TID PRN, Carlton Lozada MD  •  ondansetron (ZOFRAN) tablet 4 mg, 4 mg, Oral, Q6H PRN, Vadim Lozada MD  •  sodium chloride nasal spray 2 spray, 2 spray, Each Nare, PRN, Vadim Lozada MD  •  traZODone (DESYREL) tablet 50 mg, 50 mg, Oral, Nightly PRN, Vadim Lozada MD    ASSESSMENT & PLAN      Other recurrent depressive disorders (CMS/[facility])  Patient is on suicidal precautions, will evaluate resuming or replacing the bupropion prospectively, individual and group psychotherapeutic efforts    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    Methamphetamine abuse (CMS/HCC)  Recommending referral to a residential rehabilitation program borderline intellectual disability    Tetrahydrocannabinol (THC) use disorder, mild, abuse  Recommending referral to a residential rehabilitation program  Borderline low intellect  Incorporate into the psychotherapeutic effort  History of recent back injury  Will order radiological exam  ADHD  Based on prior diagnostic impressions    Special precautions: Special Precautions Level 3 (q15 min checks)     Behavioral Health Treatment  Plan and Problem List: I have reviewed and approved the Behavioral Health Treatment Plan and Problem list.    I spent a total of 30 minutes in direct patient care including  25 minutes face to face with the patient assessment, coordination of care, and counseling the patient on the current and follow-up treatment plans regarding his drug use, mood and substance abuse.. Answered patient questions regarding current medications..    SHERLYN Lozada MD    Clinician:  Carlton Lozada MD  03/13/20  07:55    Dictated utilizing Dragon dictation

## 2020-03-13 NOTE — PLAN OF CARE
Problem: Patient Care Overview  Goal: Plan of Care Review  3/13/2020 1433 by Jennie Blackburn CSW  Outcome: Ongoing (interventions implemented as appropriate)  Flowsheets  Taken 3/12/2020 1405 by Jennie Blackburn CSW  Consent Given to Review Plan with: [facility] and Aric Mims (father who is also guardian)  Taken 3/13/2020 1416 by Malik Gore RN  Plan of Care Reviewed With: patient  Taken 3/13/2020 1433 by Jennie Blackburn CSW  Patient Agreement with Plan of Care: agrees    [id number]  Therapist has made multiple attempts contacting Patient's father Aric at [phone] to discuss Treatment Team's recommendation of Reed's Law. However, Therapist has been unsuccessful reaching Aric and could not leave a voicemail. Efforts will be ongoing. If Therapist continues to be unsuccessful reaching Patient's father; Therapist will staff case with weekend Therapist in efforts to ensure that information is presented.

## 2020-03-14 PROCEDURE — 99232 SBSQ HOSP IP/OBS MODERATE 35: CPT | Performed by: PSYCHIATRY & NEUROLOGY

## 2020-03-14 RX ADMIN — OLANZAPINE 5 MG: 5 TABLET, ORALLY DISINTEGRATING ORAL at 09:13

## 2020-03-14 RX ADMIN — IBUPROFEN 400 MG: 400 TABLET, FILM COATED ORAL at 09:14

## 2020-03-14 RX ADMIN — NICOTINE 1 PATCH: 21 PATCH TRANSDERMAL at 09:13

## 2020-03-14 NOTE — PROGRESS NOTES
"INPATIENT PSYCHIATRIC PROGRESS NOTE    Name:  Cruz Mims  :  1985  MRN:  [id number]  Visit Number:  [id number]  Length of stay:  3    SUBJECTIVE  CC/Focus of Exam: psychosis/substance abuse    INTERVAL HISTORY:  The patient states he is feeling better, has insight into his drug use causing his psychosis. He states he hopes he can go home first and try to be sober on his own.  Depression rating 0/10  Anxiety rating 0/10  Sleep: good  Withdrawal sx: denies   Cravin/10    Review of Systems   Respiratory: Negative.    Cardiovascular: Negative.    Gastrointestinal: Negative.    Musculoskeletal: Positive for back pain.       OBJECTIVE    Temp:  [97.2 °F (36.2 °C)] 97.2 °F (36.2 °C)  Heart Rate:  [104-118] 104  Resp:  [18] 18  BP: (134-142)/(81-85) 142/85    MENTAL STATUS EXAM:  Appearance:Casually dressed, good hygeine.   Cooperation:Cooperative  Psychomotor: No psychomotor agitation/retardation, No EPS, No motor tics  Speech-normal rate, amount.  Mood \"good\"   Affect-congruent, appropriate, stable  Thought Content-goal directed, no delusional material present  Thought process-linear, organized.  Suicidality: No SI  Homicidality: No HI  Perception: No AH/VH  Insight-fair   Judgement-fair    Lab Results (last 24 hours)     Procedure Component Value Units Date/Time    Hepatitis Panel, Acute [[id number]] Collected:  20    Specimen:  Blood Updated:  20    Narrative:       The following orders were created for panel order Hepatitis Panel, Acute.  Procedure                               Abnormality         Status                     ---------                               -----------         ------                     Hepatitis Panel, Acute[[id number]]       Abnormal            Final result               Hep B Confirmation Tube[[id number]]                          Final result                 Please view results for these tests on the individual orders.    Hep B Confirmation Tube " [[id number]] Collected:  03/13/20 0803    Specimen:  Blood Updated:  03/13/20 2015     Extra Tube Hold for add-ons.     Comment: Auto resulted.                Imaging Results (Last 24 Hours)     ** No results found for the last 24 hours. **             ECG/EMG Results (most recent)     Procedure Component Value Units Date/Time    ECG 12 Lead [[id number]] Collected:  03/11/20 2239     Updated:  03/12/20 1109    Narrative:       Test Reason : Baseline Cardiac Status  Blood Pressure : **/** mmHG  Vent. Rate : 092 BPM     Atrial Rate : 092 BPM     P-R Int : 122 ms          QRS Dur : 092 ms      QT Int : 352 ms       P-R-T Axes : 059 059 041 degrees     QTc Int : 435 ms    Normal sinus rhythm  Normal ECG  When compared with ECG of 10-FEB-2020 01:24,  No significant change was found  Confirmed by Saman Zhou (2020) on 3/12/2020 11:09:40 AM    Referred By:  EVANS           Confirmed By:Saman Zhou           ALLERGIES: Amoxicillin and Penicillins      Current Facility-Administered Medications:   •  aluminum-magnesium hydroxide-simethicone (MAALOX MAX) 400-400-40 MG/5ML suspension 15 mL, 15 mL, Oral, Q6H PRN, Vadim Lozada MD  •  benzonatate (TESSALON) capsule 100 mg, 100 mg, Oral, TID PRN, Vadim Lozada MD  •  famotidine (PEPCID) tablet 20 mg, 20 mg, Oral, BID PRN, Vadim Lozada MD  •  ibuprofen (ADVIL,MOTRIN) tablet 400 mg, 400 mg, Oral, Q6H PRN, Vadim Lozada MD, 400 mg at 03/14/20 0914  •  loperamide (IMODIUM) capsule 2 mg, 2 mg, Oral, Q2H PRN, Vadim Lozada MD  •  magnesium hydroxide (MILK OF MAGNESIA) suspension 2400 mg/10mL 10 mL, 10 mL, Oral, Daily PRN, Vadim Lozada MD  •  nicotine (NICODERM CQ) 21 MG/24HR patch 1 patch, 1 patch, Transdermal, Q24H, Vadim Lozada MD, 1 patch at 03/14/20 0913  •  OLANZapine zydis (zyPREXA) disintegrating tablet 5 mg, 5 mg, Oral, Daily, Carlton Lozada MD, 5 mg at 03/14/20 0913  •  OLANZapine zydis (zyPREXA) disintegrating tablet 5 mg,  5 mg, Oral, TID PRN, Carlton Lozada MD  •  ondansetron (ZOFRAN) tablet 4 mg, 4 mg, Oral, Q6H PRN, Vadim Lozada MD  •  sodium chloride nasal spray 2 spray, 2 spray, Each Nare, PRN, Vadim Lozada MD  •  traZODone (DESYREL) tablet 50 mg, 50 mg, Oral, Nightly PRN, Vadim Lozada MD    ASSESSMENT & PLAN:      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    Behavioral Health Treatment Plan and Problem List: I have reviewed and approved the Behavioral Health Treatment Plan and Problem list.  The patient has had a chance to review and agrees with the treatment plan.     Clinician:  Asaf Dejesus MD  03/14/20  14:40

## 2020-03-14 NOTE — PLAN OF CARE
Problem: Patient Care Overview  Goal: Plan of Care Review  Outcome: Ongoing (interventions implemented as appropriate)  Flowsheets  Taken 3/14/2020 0433  Progress: no change  Outcome Summary: Pt continues to isolate to room. Denies anxiety depression SI HI AVH.  Taken 3/13/2020 1955  Plan of Care Reviewed With: patient  Patient Agreement with Plan of Care: agrees

## 2020-03-15 PROCEDURE — 99232 SBSQ HOSP IP/OBS MODERATE 35: CPT | Performed by: PSYCHIATRY & NEUROLOGY

## 2020-03-15 RX ADMIN — NICOTINE 1 PATCH: 21 PATCH TRANSDERMAL at 10:08

## 2020-03-15 RX ADMIN — TRAZODONE HYDROCHLORIDE 50 MG: 50 TABLET ORAL at 20:18

## 2020-03-15 RX ADMIN — OLANZAPINE 5 MG: 5 TABLET, ORALLY DISINTEGRATING ORAL at 10:08

## 2020-03-15 NOTE — PLAN OF CARE
Problem: Patient Care Overview  Goal: Plan of Care Review  Outcome: Ongoing (interventions implemented as appropriate)  Flowsheets  Taken 3/15/2020 0427  Progress: no change  Outcome Summary: Pt isolates to room. Denies anxiety depression SI HI AVH.  Taken 3/14/2020 2025  Plan of Care Reviewed With: patient  Patient Agreement with Plan of Care: agrees

## 2020-03-15 NOTE — PLAN OF CARE
PT DENIES ANY ANXIETY, DEPRESSION, SI, HI, OR AVH THIS SHIFT. PT SPENT MAJORITY OF THE SHIFT IN THE DAYROOM INTERACTING WITH PEERS TODAY.

## 2020-03-15 NOTE — PROGRESS NOTES
"INPATIENT PSYCHIATRIC PROGRESS NOTE    Name:  Cruz Mims  :  1985  MRN:  [id number]  Visit Number:  [id number]  Length of stay:  4    SUBJECTIVE  CC/Focus of Exam: psychosis/substance abuse    INTERVAL HISTORY:  The patient states he is feeling better, and reports no acute problems.  Depression rating 0/10  Anxiety rating 0/10  Sleep: good  Withdrawal sx: denies   Cravin/10    Review of Systems   Respiratory: Negative.    Cardiovascular: Negative.    Gastrointestinal: Negative.    Musculoskeletal: Positive for back pain.       OBJECTIVE    Temp:  [98.5 °F (36.9 °C)] 98.5 °F (36.9 °C)  Heart Rate:  [] 136  Resp:  [18] 18  BP: (130-156)/(67-89) 156/89    MENTAL STATUS EXAM:  Appearance:Casually dressed, good hygeine.   Cooperation:Cooperative  Psychomotor: No psychomotor agitation/retardation, No EPS, No motor tics  Speech-normal rate, amount.  Mood \"good\"   Affect-congruent, appropriate, stable  Thought Content-goal directed, no delusional material present  Thought process-linear, organized.  Suicidality: No SI  Homicidality: No HI  Perception: No AH/VH  Insight-fair   Judgement-fair    Lab Results (last 24 hours)     ** No results found for the last 24 hours. **             Imaging Results (Last 24 Hours)     ** No results found for the last 24 hours. **             ECG/EMG Results (most recent)     Procedure Component Value Units Date/Time    ECG 12 Lead [[id number]] Collected:  20     Updated:  209    Narrative:       Test Reason : Baseline Cardiac Status  Blood Pressure : **/** mmHG  Vent. Rate : 092 BPM     Atrial Rate : 092 BPM     P-R Int : 122 ms          QRS Dur : 092 ms      QT Int : 352 ms       P-R-T Axes : 059 059 041 degrees     QTc Int : 435 ms    Normal sinus rhythm  Normal ECG  When compared with ECG of 10-FEB-2020 01:24,  No significant change was found  Confirmed by Saman Zhou () on 3/12/2020 11:09:40 AM    Referred By:  EVANS" Confirmed By:Saman Subramaniyam           ALLERGIES: Amoxicillin and Penicillins      Current Facility-Administered Medications:   •  aluminum-magnesium hydroxide-simethicone (MAALOX MAX) 400-400-40 MG/5ML suspension 15 mL, 15 mL, Oral, Q6H PRN, Vadim Lozada MD  •  benzonatate (TESSALON) capsule 100 mg, 100 mg, Oral, TID PRN, Vadim Lozada MD  •  famotidine (PEPCID) tablet 20 mg, 20 mg, Oral, BID PRN, Vadim Lozada MD  •  ibuprofen (ADVIL,MOTRIN) tablet 400 mg, 400 mg, Oral, Q6H PRN, Vadim Lozada MD, 400 mg at 03/14/20 0914  •  loperamide (IMODIUM) capsule 2 mg, 2 mg, Oral, Q2H PRN, Vadim Lozada MD  •  magnesium hydroxide (MILK OF MAGNESIA) suspension 2400 mg/10mL 10 mL, 10 mL, Oral, Daily PRN, Vadim Lozada MD  •  nicotine (NICODERM CQ) 21 MG/24HR patch 1 patch, 1 patch, Transdermal, Q24H, Vadim Lozada MD, 1 patch at 03/15/20 1008  •  OLANZapine zydis (zyPREXA) disintegrating tablet 5 mg, 5 mg, Oral, Daily, Carlton Lozada MD, 5 mg at 03/15/20 1008  •  OLANZapine zydis (zyPREXA) disintegrating tablet 5 mg, 5 mg, Oral, TID PRN, Carlton Lozada MD  •  ondansetron (ZOFRAN) tablet 4 mg, 4 mg, Oral, Q6H PRN, Vadim Lozada MD  •  sodium chloride nasal spray 2 spray, 2 spray, Each Nare, PRN, Vadim Lozada MD  •  traZODone (DESYREL) tablet 50 mg, 50 mg, Oral, Nightly PRN, Vadim Lozada MD    ASSESSMENT & PLAN:      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    Behavioral Health Treatment Plan and Problem List: I have  reviewed and approved the Behavioral Health Treatment Plan and Problem list.  The patient has had a chance to review and agrees with the treatment plan.     Clinician:  Asaf Dejesus MD  03/15/20  18:22

## 2020-03-16 PROCEDURE — 99232 SBSQ HOSP IP/OBS MODERATE 35: CPT | Performed by: PSYCHIATRY & NEUROLOGY

## 2020-03-16 RX ADMIN — OLANZAPINE 5 MG: 5 TABLET, ORALLY DISINTEGRATING ORAL at 09:36

## 2020-03-16 RX ADMIN — NICOTINE 1 PATCH: 21 PATCH TRANSDERMAL at 09:36

## 2020-03-16 NOTE — PLAN OF CARE
Problem: Patient Care Overview  Goal: Plan of Care Review  Outcome: Ongoing (interventions implemented as appropriate)  Flowsheets  Taken 3/16/2020 0518  Progress: no change  Outcome Summary: Pt in bed all shift. Denies anxiety depression SI HI AVH.  Taken 3/15/2020 2232  Plan of Care Reviewed With: patient  Patient Agreement with Plan of Care: agrees

## 2020-03-16 NOTE — PROGRESS NOTES
"INPATIENT PSYCHIATRIC PROGRESS NOTE    Name:  Cruz Mims  :  1985  MRN:  [id number]  Visit Number:  [id number]  Length of stay:  5    Behavioral Health Treatment Plan and Problem List: I have reviewed and approved the Behavioral Health Treatment Plan and Problem list.    SUBJECTIVE    CC/ Focus of exam: Depression/psychosis/substance abuse    Patient's subjective status: \"Doing better\"    INTERVAL HISTORY: Patient's psychosis has cleared, his affect is appropriate, however he remains oblivious to what might help him with his substance abuse behavior refusing additional treatment for his problem.      Contacted his father ([phone]) who stated his intent to contact the [facility] court later today.  We will encourage patient to remain in the hospital, hopefully this might facilitate his admission to residential chemical dependency treatment program, if not tomorrow, in approximate future.    Depression rating says not   anxiety rating says not  Sleep: Slept well  Withdrawal sx: None  Craving: Says not     Review of Systems   Respiratory: Negative.    Cardiovascular: Negative.    Gastrointestinal: Negative.    Neurological: Negative.          OBJECTIVE    Temp:  [97.2 °F (36.2 °C)-98.5 °F (36.9 °C)] 98.2 °F (36.8 °C)  Heart Rate:  [] 97  Resp:  [18] 18  BP: (118-156)/(74-89) 126/74    MENTAL STATUS EXAM:      Appearance:Casually dressed, good hygeine.   Cooperation:Cooperative  Psychomotor: No psychomotor agitation/retardation, No EPS, No motor tics  Speech-normal rate, amount.   Mood/Affect:  Euthymic  Thought Processes:  coherent  Thought Content:  distorted  Hallucination(s): none  Hopelessness: No  Optimistic:minimally  Suicidal Thoughts:   No  Suicidal Plan/Intent:  No  Homicidal Thoughts:  absent  Orientation: oriented x 3  Memory: recent intact    Lab Results (last 24 hours)     ** No results found for the last 24 hours. **           Imaging Results (Last 24 Hours)     ** No results " found for the last 24 hours. **           ECG/EMG Results (most recent)     Procedure Component Value Units Date/Time    ECG 12 Lead [[id number]] Collected:  03/11/20 2239     Updated:  03/12/20 1109    Narrative:       Test Reason : Baseline Cardiac Status  Blood Pressure : **/** mmHG  Vent. Rate : 092 BPM     Atrial Rate : 092 BPM     P-R Int : 122 ms          QRS Dur : 092 ms      QT Int : 352 ms       P-R-T Axes : 059 059 041 degrees     QTc Int : 435 ms    Normal sinus rhythm  Normal ECG  When compared with ECG of 10-FEB-2020 01:24,  No significant change was found  Confirmed by Saman Zhou (2020) on 3/12/2020 11:09:40 AM    Referred By:  [place]           Confirmed By:Saman Zhou           ALLERGIES: Amoxicillin and Penicillins      Current Facility-Administered Medications:   •  aluminum-magnesium hydroxide-simethicone (MAALOX MAX) 400-400-40 MG/5ML suspension 15 mL, 15 mL, Oral, Q6H PRN, Vadim Lozada MD  •  benzonatate (TESSALON) capsule 100 mg, 100 mg, Oral, TID PRN, Vadim Lozada MD  •  famotidine (PEPCID) tablet 20 mg, 20 mg, Oral, BID PRN, Vadim Lozada MD  •  ibuprofen (ADVIL,MOTRIN) tablet 400 mg, 400 mg, Oral, Q6H PRN, Vadim Lozada MD, 400 mg at 03/14/20 0914  •  loperamide (IMODIUM) capsule 2 mg, 2 mg, Oral, Q2H PRN, Vadim Lozada MD  •  magnesium hydroxide (MILK OF MAGNESIA) suspension 2400 mg/10mL 10 mL, 10 mL, Oral, Daily PRN, Vadim Lozada MD  •  nicotine (NICODERM CQ) 21 MG/24HR patch 1 patch, 1 patch, Transdermal, Q24H, Vadim Lozada MD, 1 patch at 03/15/20 1008  •  OLANZapine zydis (zyPREXA) disintegrating tablet 5 mg, 5 mg, Oral, Daily, Carlton Lozada MD, 5 mg at 03/15/20 1008  •  OLANZapine zydis (zyPREXA) disintegrating tablet 5 mg, 5 mg, Oral, TID PRN, Carlton Lozada MD  •  ondansetron (ZOFRAN) tablet 4 mg, 4 mg, Oral, Q6H PRN, Vadim Lozada MD  •  sodium chloride nasal spray 2 spray, 2 spray, Each Nare, PRN, Neville,  Vadim STAUFFER MD  •  traZODone (DESYREL) tablet 50 mg, 50 mg, Oral, Nightly PRN, Vadim Lozada MD, 50 mg at 03/15/20 2018    ASSESSMENT & PLAN    Other recurrent depressive disorders (CMS/[facility])  Patient is on suicidal precautions, Zyprexa, individual and group psychotherapeutic efforts    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    Methamphetamine abuse (CMS/[facility])  Recommending referral to a residential rehabilitation program borderline intellectual disability  Father to pursue application of Reed's law today.    Tetrahydrocannabinol (THC) use disorder, mild, abuse  Recommending referral to a residential rehabilitation program  Borderline low intellect  Incorporate into the psychotherapeutic effort  History of recent back injury  Compression fracture L2.  Patient will damage to be pursuing opiate prescriptions on this basis  ADHD  Based on prior diagnostic impressions      Special precautions: Special Precautions Level 3 (q15 min checks)     Behavioral Health Treatment Plan and Problem List: I have reviewed and approved the Behavioral Health Treatment Plan and Problem list.    I spent a total of 30 minutes in direct patient care including 25 minutes face to face with the patient assessment, coordination of care, and counseling the patient on the current and follow-up treatment plans regarding his situation and psychosis. Answered patient questions regarding anticipated discharge tomorrow..    SHERLYN Lozada MD    Clinician:  Carlton Lozada MD  03/16/20  08:36    Dictated utilizing Dragon dictation

## 2020-03-16 NOTE — PLAN OF CARE
PT DENIES ANY ANXIETY, DEPRESSION, SI, HI, OR AVH. PT DENIES ANY WITHDRAWALS OR CRAVINGS. PT CONTINUES TO LACK INSIGHT CONCERNING HIS SUBSTANCE ABUSE ISSUES.

## 2020-03-16 NOTE — PROGRESS NOTES
1114  Therapist made another attempt of contacting Patient's father Aric in efforts to review residential placements. Therapist unsuccessful again at this time.

## 2020-03-16 NOTE — PLAN OF CARE
"  Problem: Patient Care Overview  Goal: Plan of Care Review  Outcome: Ongoing (interventions implemented as appropriate)  Flowsheets (Taken 3/16/2020 0942)  Consent Given to Review Plan with: DEMETRIA Aric Mims (who is also guardian), Claudine, [facility], and [facility]  Progress: improving  Plan of Care Reviewed With: patient  Patient Agreement with Plan of Care: agrees     0920  Therapist met with Patient at bedside this date. Patient agreeable to discuss treatment progress and discharge concerns. Patient reports feeling better today with plans to return home. Therapist inquired about Patient's methods to prevent relapse; Patient unable to provide at this time simply stating \"I can do it. I've done it before\". Patient went on to discuss group homes but the need to enter a residential program prior to in efforts to be accepted. Therapist provided information regarding residential treatment. Patient agreeable if he is permitted to smoke during the program; Patient signed consent for [facility], [facility], and [facility].  At this time Patient's father Aric has temporary guardianship; Aric must also approve these consents prior to Therapist sending referrals. Therapist attempted to contact Aric however was unsuccessful at this time. Therapist reviewed Dr. Stevens's assessment at this time and noted contact with Patient's father who reported plans on attending court this morning.   "

## 2020-03-17 VITALS
HEART RATE: 115 BPM | TEMPERATURE: 97.1 F | OXYGEN SATURATION: 97 % | SYSTOLIC BLOOD PRESSURE: 117 MMHG | BODY MASS INDEX: 25.28 KG/M2 | HEIGHT: 70 IN | DIASTOLIC BLOOD PRESSURE: 74 MMHG | WEIGHT: 176.6 LBS | RESPIRATION RATE: 18 BRPM

## 2020-03-17 PROCEDURE — 99239 HOSP IP/OBS DSCHRG MGMT >30: CPT | Performed by: PSYCHIATRY & NEUROLOGY

## 2020-03-17 RX ORDER — OLANZAPINE 5 MG/1
5 TABLET ORAL NIGHTLY
Qty: 30 TABLET | Refills: 0 | Status: ON HOLD | OUTPATIENT
Start: 2020-03-17 | End: 2020-07-06

## 2020-03-17 RX ADMIN — NICOTINE 1 PATCH: 21 PATCH TRANSDERMAL at 08:08

## 2020-03-17 RX ADMIN — OLANZAPINE 5 MG: 5 TABLET, ORALLY DISINTEGRATING ORAL at 08:08

## 2020-03-17 NOTE — DISCHARGE INSTR - APPOINTMENTS
[facility]  [address]  [facility] [address]  [phone]    March 19 2020 at 12:00pm with Guy.   Please arrive 30 minutes early for registration.

## 2020-03-17 NOTE — PLAN OF CARE
Problem: Patient Care Overview  Goal: Plan of Care Review  3/17/2020 0434 by Emilee Perry RN  Outcome: Ongoing (interventions implemented as appropriate)  Flowsheets  Taken 3/17/2020 0434  Progress: improving  Taken 3/16/2020 2254  Plan of Care Reviewed With: patient  Patient Agreement with Plan of Care: agrees  Taken 3/17/2020 0430  Outcome Summary: Pt reports sleep and appetite as both good; pt denies anxiety, depression, SI or HI; pt isolates to room; will continue to monitor patient  3/17/2020 0430 by Emilee Perry RN  Outcome: Ongoing (interventions implemented as appropriate)  Flowsheets  Taken 3/16/2020 1624 by Shirley Ring RN  Progress: improving  Taken 3/16/2020 2254 by Emilee Perry RN  Plan of Care Reviewed With: patient  Patient Agreement with Plan of Care: agrees  Taken 3/17/2020 0430 by Emilee Perry RN  Outcome Summary: Pt reports sleep and appetite as both good; pt denies anxiety, depression, SI or HI; pt isolates to room; will continue to monitor patient

## 2020-03-17 NOTE — DISCHARGE SUMMARY
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         Hospital Course:  Patient was admitted for safety and stabilization and was placed on standard precautions.  Routine labs were checked.  Patient was assigned a masters level therapist and provided with an opportunity to participate in group and individual therapy on the unit.  Patient seen on a daily basis for evaluation and supportive therapy.  Once again the patient's psychosis and depression cleared rapidly, psychotropic medications limited to Zyprexa 5 mg daily.  During his hospital stay patient persisted to minimization of his substance abuse problem and was reluctance to accept recommendations for residential treatment.  Patient's father attempted to petition the [facility] for Reed's Law only be told that they were not taking such complaints for the next 2 weeks perhaps having to do with the Corvid-19 scare.  Thus the patient was discharged home with appointment to follow-up at the [facility] clinic.  At discharge patient free of any psychotic thought content, free of any thoughts of harming self or others, and stating his intent to avoid his acquaintances that promote and encourage his resumption of drug use.  See below for medication.    Consults:   Consults     No orders found from 2/11/2020 to 3/12/2020.           Labs:  Lab Results (all)     Procedure Component Value Units Date/Time    Hepatitis Panel, Acute [[id number]] Collected:  03/13/20 0803    Specimen:  Blood Updated:  03/13/20 2015    Narrative:       The following orders were created for panel order Hepatitis Panel, Acute.  Procedure                               Abnormality         Status                     ---------                               -----------         ------                     Hepatitis Panel, Acute[[id number]]       Abnormal            Final result               Hep B Confirmation Tube[[id number]]                          Final result                 Please view results for these tests on the individual orders.    Hep B Confirmation Tube [[id number]] Collected:  03/13/20 0803    Specimen:  Blood Updated:  03/13/20 2015     Extra Tube Hold for add-ons.     Comment: Auto resulted.       Hepatitis Panel, Acute [[id number]]  (Abnormal) Collected:  03/13/20 0803    Specimen:  Blood Updated:  03/13/20 0958     Hepatitis B Surface Ag Non-Reactive     Hep A IgM Non-Reactive     Hep B C IgM Non-Reactive     Hepatitis C Ab Reactive    Narrative:       Results may be falsely decreased if patient taking Biotin.     HIV-1 & HIV-2 Antibodies [[id number]] Collected:  03/12/20 1644    Specimen:  Blood Updated:  03/12/20 1734    Narrative:       The following orders were created for panel order HIV-1 & HIV-2 Antibodies.  Procedure                               Abnormality         Status                     ---------                               -----------         ------                     HIV-1 / O / 2 Ag / Antib...[[id number]]  Normal              Final result                 Please view results for these tests on the individual orders.    HIV-1 / O / 2 Ag / Antibody 4th Generation [[id number]]  (Normal) Collected:  03/12/20 1644    Specimen:  Blood Updated:  03/12/20 1734     HIV-1/ HIV-2 Non-Reactive     Comment: A non-reactive test result does not preclude  the possibility of exposure to HIV or infection with HIV. An antibody response to recent exposure may take several months to reach detectable levels.       Narrative:       The HIV antibody/antigen combo assay is a qualitative assay for HIV that includes the p24 antigen as well as antibodies to HIV types 1 and 2. This test is intended to be used as a screening assay in the diagnosis of HIV infection in patients over the age of 2.  Results may be falsely decreased if patient taking Biotin.      Potassium [[id number]]  (Normal) Collected:  03/12/20 0754    Specimen:  Blood Updated:  03/12/20 0841     Potassium 4.2 mmol/L           Imaging:  Imaging Results (All)     Procedure Component Value Units Date/Time    XR Spine Lumbar Complete 4+VW [[id number]] Collected:  03/13/20 1008     Updated:  03/13/20 1010    Narrative:       XR SPINE LUMBAR COMPLETE 4+VW-     CLINICAL INDICATION: History of recent fall and alleged back injury        COMPARISON: 06/01/2015     TECHNIQUE: 5 views of the lumbar spine, including obliques .     FINDINGS:   Compression fracture of L2. No retropulsion..  The disc space heights are preserved.   The oblique views show no evidence of spondylolysis.  The pedicles are intact on the frontal view.   The visualized portions of the lower ribs are unremarkable. The  sacroiliac joints are symmetric.       Impression:       L2 compression fracture. No significant retropulsion     This report was finalized on 3/13/2020 10:08 AM by Dr. Destin Figueredo MD.           Condition on Discharge:  improved    Prognosis: Guarded given the patient's recidivism.    Vital Signs  Temp:  [97.1 °F (36.2 °C)-97.5 °F (36.4 °C)] 97.1 °F (36.2 °C)  Heart Rate:  [112-115] 115  Resp:  [18] 18  BP: (117-126)/(74-77) 117/74    Discharge Disposition  Home or Self Care    Discharge Medications     Discharge Medications      New Medications      Instructions Start Date   OLANZapine 5 MG tablet  Commonly known as:  ZyPREXA   5 mg,  Oral, Nightly         Stop These Medications    ARIPiprazole 5 MG tablet  Commonly known as:  ABILIFY     buPROPion  MG 24 hr tablet  Commonly known as:  WELLBUTRIN XL     ibuprofen 800 MG tablet  Commonly known as:  ADVIL,MOTRIN     methocarbamol 750 MG tablet  Commonly known as:  ROBAXIN     mirtazapine 15 MG tablet  Commonly known as:  REMERON     oxyCODONE-acetaminophen 5-325 MG per tablet  Commonly known as:  PERCOCET            Discharge Diet: Regular    Activity at Discharge: No restrictions    Follow-up Appointments: Patient will be given a follow-up appointment at the [facility]      Carlton Lozada MD  03/17/20  09:23  Time spent with the discharge process >30 minutes.     Dictated utilizing Dragon dictation

## 2020-07-06 ENCOUNTER — HOSPITAL ENCOUNTER (EMERGENCY)
Facility: HOSPITAL | Age: 35
Discharge: PSYCHIATRIC OR SUBSTANCE USE FACILITY | End: 2020-07-06
Attending: EMERGENCY MEDICINE | Admitting: EMERGENCY MEDICINE

## 2020-07-06 ENCOUNTER — HOSPITAL ENCOUNTER (INPATIENT)
Facility: HOSPITAL | Age: 35
LOS: 3 days | Discharge: HOME | End: 2020-07-09
Attending: PSYCHIATRY & NEUROLOGY | Admitting: PSYCHIATRY & NEUROLOGY

## 2020-07-06 VITALS
SYSTOLIC BLOOD PRESSURE: 125 MMHG | WEIGHT: 170 LBS | DIASTOLIC BLOOD PRESSURE: 73 MMHG | RESPIRATION RATE: 18 BRPM | HEART RATE: 71 BPM | BODY MASS INDEX: 25.76 KG/M2 | TEMPERATURE: 97.3 F | HEIGHT: 68 IN | OXYGEN SATURATION: 95 %

## 2020-07-06 DIAGNOSIS — F15.959 METHAMPHETAMINE-INDUCED PSYCHOTIC DISORDER (HCC): ICD-10-CM

## 2020-07-06 DIAGNOSIS — R45.851 SUICIDAL IDEATION: Primary | ICD-10-CM

## 2020-07-06 PROBLEM — F32.A DEPRESSION WITH SUICIDAL IDEATION: Status: ACTIVE | Noted: 2020-07-06

## 2020-07-06 LAB
6-ACETYL MORPHINE: NEGATIVE
ALBUMIN SERPL-MCNC: 4.42 G/DL (ref 3.5–5.2)
ALBUMIN/GLOB SERPL: 1.3 G/DL
ALP SERPL-CCNC: 96 U/L (ref 39–117)
ALT SERPL W P-5'-P-CCNC: 33 U/L (ref 1–41)
AMPHET+METHAMPHET UR QL: POSITIVE
ANION GAP SERPL CALCULATED.3IONS-SCNC: 12.4 MMOL/L (ref 5–15)
AST SERPL-CCNC: 32 U/L (ref 1–40)
BARBITURATES UR QL SCN: NEGATIVE
BASOPHILS # BLD AUTO: 0.03 10*3/MM3 (ref 0–0.2)
BASOPHILS NFR BLD AUTO: 0.3 % (ref 0–1.5)
BENZODIAZ UR QL SCN: NEGATIVE
BILIRUB SERPL-MCNC: 0.3 MG/DL (ref 0.2–1.2)
BILIRUB UR QL STRIP: NEGATIVE
BUN SERPL-MCNC: 11 MG/DL (ref 6–20)
BUN/CREAT SERPL: 14.7 (ref 7–25)
BUPRENORPHINE SERPL-MCNC: NEGATIVE NG/ML
CALCIUM SPEC-SCNC: 9.5 MG/DL (ref 8.6–10.5)
CANNABINOIDS SERPL QL: POSITIVE
CHLORIDE SERPL-SCNC: 103 MMOL/L (ref 98–107)
CLARITY UR: CLEAR
CO2 SERPL-SCNC: 24.6 MMOL/L (ref 22–29)
COCAINE UR QL: NEGATIVE
COLOR UR: ABNORMAL
CREAT SERPL-MCNC: 0.75 MG/DL (ref 0.76–1.27)
DEPRECATED RDW RBC AUTO: 43.8 FL (ref 37–54)
EOSINOPHIL # BLD AUTO: 0.05 10*3/MM3 (ref 0–0.4)
EOSINOPHIL NFR BLD AUTO: 0.6 % (ref 0.3–6.2)
ERYTHROCYTE [DISTWIDTH] IN BLOOD BY AUTOMATED COUNT: 13.4 % (ref 12.3–15.4)
ETHANOL BLD-MCNC: <10 MG/DL (ref 0–10)
ETHANOL UR QL: <0.01 %
GFR SERPL CREATININE-BSD FRML MDRD: 120 ML/MIN/1.73
GLOBULIN UR ELPH-MCNC: 3.3 GM/DL
GLUCOSE SERPL-MCNC: 102 MG/DL (ref 65–99)
GLUCOSE UR STRIP-MCNC: NEGATIVE MG/DL
HCT VFR BLD AUTO: 44 % (ref 37.5–51)
HGB BLD-MCNC: 14.5 G/DL (ref 13–17.7)
HGB UR QL STRIP.AUTO: NEGATIVE
IMM GRANULOCYTES # BLD AUTO: 0.03 10*3/MM3 (ref 0–0.05)
IMM GRANULOCYTES NFR BLD AUTO: 0.3 % (ref 0–0.5)
KETONES UR QL STRIP: ABNORMAL
LEUKOCYTE ESTERASE UR QL STRIP.AUTO: NEGATIVE
LYMPHOCYTES # BLD AUTO: 2.52 10*3/MM3 (ref 0.7–3.1)
LYMPHOCYTES NFR BLD AUTO: 29.3 % (ref 19.6–45.3)
MAGNESIUM SERPL-MCNC: 2.2 MG/DL (ref 1.6–2.6)
MCH RBC QN AUTO: 29.4 PG (ref 26.6–33)
MCHC RBC AUTO-ENTMCNC: 33 G/DL (ref 31.5–35.7)
MCV RBC AUTO: 89.2 FL (ref 79–97)
METHADONE UR QL SCN: NEGATIVE
MONOCYTES # BLD AUTO: 0.49 10*3/MM3 (ref 0.1–0.9)
MONOCYTES NFR BLD AUTO: 5.7 % (ref 5–12)
NEUTROPHILS NFR BLD AUTO: 5.49 10*3/MM3 (ref 1.7–7)
NEUTROPHILS NFR BLD AUTO: 63.8 % (ref 42.7–76)
NITRITE UR QL STRIP: NEGATIVE
NRBC BLD AUTO-RTO: 0 /100 WBC (ref 0–0.2)
OPIATES UR QL: NEGATIVE
OXYCODONE UR QL SCN: NEGATIVE
PCP UR QL SCN: NEGATIVE
PH UR STRIP.AUTO: 5.5 [PH] (ref 5–8)
PLATELET # BLD AUTO: 226 10*3/MM3 (ref 140–450)
PMV BLD AUTO: 10.9 FL (ref 6–12)
POTASSIUM SERPL-SCNC: 3.7 MMOL/L (ref 3.5–5.2)
PROT SERPL-MCNC: 7.7 G/DL (ref 6–8.5)
PROT UR QL STRIP: NEGATIVE
RBC # BLD AUTO: 4.93 10*6/MM3 (ref 4.14–5.8)
SODIUM SERPL-SCNC: 140 MMOL/L (ref 136–145)
SP GR UR STRIP: 1.03 (ref 1–1.03)
UROBILINOGEN UR QL STRIP: ABNORMAL
WBC # BLD AUTO: 8.61 10*3/MM3 (ref 3.4–10.8)

## 2020-07-06 PROCEDURE — 80307 DRUG TEST PRSMV CHEM ANLYZR: CPT | Performed by: EMERGENCY MEDICINE

## 2020-07-06 PROCEDURE — 81003 URINALYSIS AUTO W/O SCOPE: CPT | Performed by: EMERGENCY MEDICINE

## 2020-07-06 PROCEDURE — 85025 COMPLETE CBC W/AUTO DIFF WBC: CPT | Performed by: EMERGENCY MEDICINE

## 2020-07-06 PROCEDURE — 25010000002 LORAZEPAM PER 2 MG: Performed by: PSYCHIATRY & NEUROLOGY

## 2020-07-06 PROCEDURE — 99284 EMERGENCY DEPT VISIT MOD MDM: CPT

## 2020-07-06 PROCEDURE — 80053 COMPREHEN METABOLIC PANEL: CPT | Performed by: EMERGENCY MEDICINE

## 2020-07-06 PROCEDURE — 25010000002 HALOPERIDOL LACTATE PER 5 MG: Performed by: PSYCHIATRY & NEUROLOGY

## 2020-07-06 PROCEDURE — 83735 ASSAY OF MAGNESIUM: CPT | Performed by: EMERGENCY MEDICINE

## 2020-07-06 PROCEDURE — 93005 ELECTROCARDIOGRAM TRACING: CPT | Performed by: PSYCHIATRY & NEUROLOGY

## 2020-07-06 PROCEDURE — 93010 ELECTROCARDIOGRAM REPORT: CPT | Performed by: SPECIALIST

## 2020-07-06 PROCEDURE — 25010000002 DIPHENHYDRAMINE PER 50 MG: Performed by: PSYCHIATRY & NEUROLOGY

## 2020-07-06 RX ORDER — BENZTROPINE MESYLATE 1 MG/1
2 TABLET ORAL ONCE AS NEEDED
Status: DISCONTINUED | OUTPATIENT
Start: 2020-07-06 | End: 2020-07-07

## 2020-07-06 RX ORDER — TRAZODONE HYDROCHLORIDE 50 MG/1
50 TABLET ORAL NIGHTLY PRN
Status: DISCONTINUED | OUTPATIENT
Start: 2020-07-06 | End: 2020-07-08

## 2020-07-06 RX ORDER — ONDANSETRON 4 MG/1
4 TABLET, FILM COATED ORAL EVERY 6 HOURS PRN
Status: DISCONTINUED | OUTPATIENT
Start: 2020-07-06 | End: 2020-07-09 | Stop reason: HOSPADM

## 2020-07-06 RX ORDER — HYDROXYZINE 50 MG/1
50 TABLET, FILM COATED ORAL EVERY 6 HOURS PRN
Status: DISCONTINUED | OUTPATIENT
Start: 2020-07-06 | End: 2020-07-09 | Stop reason: HOSPADM

## 2020-07-06 RX ORDER — FAMOTIDINE 20 MG/1
20 TABLET, FILM COATED ORAL 2 TIMES DAILY PRN
Status: DISCONTINUED | OUTPATIENT
Start: 2020-07-06 | End: 2020-07-09 | Stop reason: HOSPADM

## 2020-07-06 RX ORDER — LORAZEPAM 2 MG/ML
2 INJECTION INTRAMUSCULAR ONCE
Status: COMPLETED | OUTPATIENT
Start: 2020-07-06 | End: 2020-07-06

## 2020-07-06 RX ORDER — ACETAMINOPHEN 325 MG/1
650 TABLET ORAL EVERY 6 HOURS PRN
Status: DISCONTINUED | OUTPATIENT
Start: 2020-07-06 | End: 2020-07-09 | Stop reason: HOSPADM

## 2020-07-06 RX ORDER — HALOPERIDOL 5 MG/ML
5 INJECTION INTRAMUSCULAR ONCE
Status: COMPLETED | OUTPATIENT
Start: 2020-07-06 | End: 2020-07-06

## 2020-07-06 RX ORDER — BUPROPION HYDROCHLORIDE 150 MG/1
300 TABLET ORAL DAILY
Status: CANCELLED | OUTPATIENT
Start: 2020-07-06

## 2020-07-06 RX ORDER — DIPHENHYDRAMINE HYDROCHLORIDE 50 MG/ML
25 INJECTION INTRAMUSCULAR; INTRAVENOUS ONCE
Status: COMPLETED | OUTPATIENT
Start: 2020-07-06 | End: 2020-07-06

## 2020-07-06 RX ORDER — BENZONATATE 100 MG/1
100 CAPSULE ORAL 3 TIMES DAILY PRN
Status: DISCONTINUED | OUTPATIENT
Start: 2020-07-06 | End: 2020-07-09 | Stop reason: HOSPADM

## 2020-07-06 RX ORDER — ALUMINA, MAGNESIA, AND SIMETHICONE 2400; 2400; 240 MG/30ML; MG/30ML; MG/30ML
15 SUSPENSION ORAL EVERY 6 HOURS PRN
Status: DISCONTINUED | OUTPATIENT
Start: 2020-07-06 | End: 2020-07-09 | Stop reason: HOSPADM

## 2020-07-06 RX ORDER — IBUPROFEN 400 MG/1
400 TABLET ORAL EVERY 6 HOURS PRN
Status: DISCONTINUED | OUTPATIENT
Start: 2020-07-06 | End: 2020-07-09 | Stop reason: HOSPADM

## 2020-07-06 RX ORDER — ECHINACEA PURPUREA EXTRACT 125 MG
2 TABLET ORAL AS NEEDED
Status: DISCONTINUED | OUTPATIENT
Start: 2020-07-06 | End: 2020-07-09 | Stop reason: HOSPADM

## 2020-07-06 RX ORDER — BENZTROPINE MESYLATE 1 MG/ML
1 INJECTION INTRAMUSCULAR; INTRAVENOUS ONCE AS NEEDED
Status: DISCONTINUED | OUTPATIENT
Start: 2020-07-06 | End: 2020-07-07

## 2020-07-06 RX ORDER — BUPROPION HYDROCHLORIDE 300 MG/1
300 TABLET ORAL DAILY
COMMUNITY
End: 2020-07-09 | Stop reason: HOSPADM

## 2020-07-06 RX ORDER — LOPERAMIDE HYDROCHLORIDE 2 MG/1
2 CAPSULE ORAL
Status: DISCONTINUED | OUTPATIENT
Start: 2020-07-06 | End: 2020-07-09 | Stop reason: HOSPADM

## 2020-07-06 RX ADMIN — HALOPERIDOL LACTATE 5 MG: 5 INJECTION, SOLUTION INTRAMUSCULAR at 18:18

## 2020-07-06 RX ADMIN — DIPHENHYDRAMINE HYDROCHLORIDE 25 MG: 50 INJECTION, SOLUTION INTRAMUSCULAR; INTRAVENOUS at 18:18

## 2020-07-06 RX ADMIN — LORAZEPAM 2 MG: 2 INJECTION INTRAMUSCULAR; INTRAVENOUS at 18:18
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
## 2020-07-06 NOTE — NURSING NOTE
.Called and provided intake information including all abnormal  labs to Dr Terry admit orders received.RBVOx2. Patient and ED provider aware.

## 2020-07-06 NOTE — NURSING NOTE
Full intake assessment completed awaiting Lab results.Patient reported feeling suicidal reporting I am going to slit my throat from ear to ear.Patient stated what would it matter no one gives a shit.Patient rated anxiety and depression 10/10.Patient reported poor sleep and appetite. Patient  Reported feeling hopeless, helpless, and worthless. Patient reported long drug history see history. Patient reports demons coming for him.

## 2020-07-07 PROCEDURE — 99223 1ST HOSP IP/OBS HIGH 75: CPT | Performed by: PSYCHIATRY & NEUROLOGY

## 2020-07-07 RX ORDER — OLANZAPINE 5 MG/1
5 TABLET, ORALLY DISINTEGRATING ORAL 3 TIMES DAILY PRN
Status: DISCONTINUED | OUTPATIENT
Start: 2020-07-07 | End: 2020-07-09 | Stop reason: HOSPADM

## 2020-07-07 RX ORDER — OLANZAPINE 5 MG/1
5 TABLET ORAL NIGHTLY
Status: DISCONTINUED | OUTPATIENT
Start: 2020-07-07 | End: 2020-07-09 | Stop reason: HOSPADM

## 2020-07-07 RX ADMIN — OLANZAPINE 5 MG: 5 TABLET, FILM COATED ORAL at 20:41

## 2020-07-07 NOTE — PLAN OF CARE
Problem: Patient Care Overview  Goal: Plan of Care Review  Outcome: Ongoing (interventions implemented as appropriate)  Flowsheets (Taken 7/7/2020 0318)  Progress: no change  Plan of Care Reviewed With: patient  Note:   Pt is new admission on 7/6/20, Has remained in bed this shift, resting  quietly.

## 2020-07-07 NOTE — PLAN OF CARE
Problem: Patient Care Overview  Goal: Plan of Care Review  Outcome: Ongoing (interventions implemented as appropriate)  Flowsheets (Taken 7/7/2020 1158)  Progress: improving  Plan of Care Reviewed With: patient  Patient Agreement with Plan of Care: agrees   Pt has remained in his room for the majority of the shift sleeping. Pt was unable to answer any questions at this time r/t his mental status.
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
## 2020-07-07 NOTE — PLAN OF CARE
Problem: Patient Care Overview  Goal: Plan of Care Review  Outcome: Ongoing (interventions implemented as appropriate)  Flowsheets (Taken 7/7/2020 1437)  Consent Given to Review Plan with: Refusing  Progress: no change  Plan of Care Reviewed With: patient  Patient Agreement with Plan of Care: refuses to participate  Outcome Summary: Therapist completed social history and integrated summary  Goal: Individualization and Mutuality  Outcome: Ongoing (interventions implemented as appropriate)  Flowsheets (Taken 7/7/2020 1437)  Patient Personal Strengths: expressive of emotions; expressive of needs; family/social support; resourceful; realistic evaluation of current/future capabilities  Patient Vulnerabilities: Ineffective coping  Patient Specific Goals (Include Timeframe): Patient will deny SI/HI prior to discharge. Patient will identify 1 healthy coping skill for stress prior to discharge.  How to Address Anxieties/Fears: Patient encourage to talk to staff as needed.  Patient Specific Interventions: Therapist will offer 1-4 individual session, family education, aftercare planning  What Information Would Help Us Give You More Personalized Care?: None reported  How Would You and/or Your Support Person Like to Participate in Your Care?: Patient will not participate  What Anxieties, Fears, Concerns, or Questions Do You Have About Your Care?: None reported  Patient Specific Preferences: None reported  Goal: Discharge Needs Assessment  Outcome: Ongoing (interventions implemented as appropriate)  Flowsheets  Taken 7/7/2020 1437 by Livia Borja  Outpatient/Agency/Support Group Needs: outpatient medication management;outpatient counseling  Discharge Coordination/Progress: Patient has insurance for discharge planning. Anticipate [facility] referral.  Concerns Comments: No comment  Anticipated Discharge Disposition: home or self-care  Current Discharge Risk: psychiatric illness  Concerns to be Addressed: mental  health;coping/stress;compliance issue  Readmission Within the Last 30 Days: no previous admission in last 30 days  Patient's Choice of Community Agency(s): [facility]  Patient/Family Anticipates Transition to: home with family  Offered/Gave Vendor List: no  Taken 7/6/2020 1412 by Hilda Langston RN  Transportation Anticipated: family or friend will provide  Transportation Concerns: car, none  Patient/Family Anticipated Services at Transition: outpatient care;mental health services  Goal: Interprofessional Rounds/Family Conf  Outcome: Ongoing (interventions implemented as appropriate)  Flowsheets (Taken 7/7/2020 1437)  Participants: social work; patient; nursing; milieu/psych techs; psychiatrist  Summary: Treatment team staffing     Problem: Overarching Goals (Adult)  Goal: Adheres to Safety Considerations for Self and Others  Outcome: Ongoing (interventions implemented as appropriate)  Goal: Optimized Coping Skills in Response to Life Stressors  Intervention: Promote Effective Coping Strategies  Flowsheets (Taken 7/7/2020 1437)  Supportive Measures: active listening utilized; counseling provided  Goal: Develops/Participates in Therapeutic [place] to Support Successful Transition  Intervention: Foster Therapeutic [place]  Flowsheets (Taken 7/7/2020 1437)  Trust Relationship/Rapport: care explained; choices provided; emotional support provided; empathic listening provided; thoughts/feelings acknowledged; reassurance provided; questions encouraged; questions answered  Intervention: Mutually Develop Transition Plan  Flowsheets (Taken 7/7/2020 1437)  Transition Support: community resources reviewed; follow-up care coordinated; follow-up care discussed; crisis management plan promoted; crisis management plan verbalized

## 2020-07-07 NOTE — PROGRESS NOTES
"1142:     DATA:      This provider is located at [facility], and the Patient  is seen remotely at [facility] using [facility]. The patient's condition being treated is appropriate for telehealth services. The provider identified herself as well as her credentials.   The patient has given consent to be seen remotely, and when consent is given they understand that the consent allows for patient identifiable information to be sent to a third party as needed.   They may refuse to be seen remotely at any time. The electronic data is encrypted and password protected, and the patient has been advised of the potential risks to privacy not withstanding such measures.     Therapist attempted individual session, patient refused.       Clinical Maneuvering/Intervention:     Therapist completed integrated summary, treatment plan, and initiated social history this date.  Therapist is strongly encouraging family involvement in treatment.       ASSESSMENT:      The patient is a 35 year old , single, disabled male.  Patient has history of multiple admissions, last being March 2020 and being diagnosed with several issues including other depressive disorders, stimulant abuse, Methamphetamine abuse, and Borderline intellectual disability.  Today, patient was in bed and would not meet with therapist.  Initial history taken from medical record.  Per report, Patient reported feeling suicidal reporting, \"I am going to slit my throat from ear to ear.\" Patient  Reported feeling hopeless, helpless, and worthless. Patient reported long drug history see history. Patient reports belief that \"demons are coming for him.\"        PLAN:       Patient to remain hospitalized this date.     Treatment team will focus efforts on stabilizing patient's acute symptoms while providing education on healthy coping and crisis management to reduce hospitalizations.   Patient requires daily psychiatrist evaluation and " 24/7 nursing supervision to promote patient  safety.     Therapist will offer 1-4 individual sessions, 1 therapy group daily, family education, and appropriate referral.    Therapist recommends outpatient psych; patient refuses to sign consents and participate this date.

## 2020-07-08 PROCEDURE — 99232 SBSQ HOSP IP/OBS MODERATE 35: CPT | Performed by: PSYCHIATRY & NEUROLOGY

## 2020-07-08 RX ORDER — MIRTAZAPINE 15 MG/1
15 TABLET, FILM COATED ORAL NIGHTLY
Status: DISCONTINUED | OUTPATIENT
Start: 2020-07-08 | End: 2020-07-09 | Stop reason: HOSPADM

## 2020-07-08 RX ADMIN — MIRTAZAPINE 15 MG: 15 TABLET, FILM COATED ORAL at 20:06

## 2020-07-08 RX ADMIN — HYDROXYZINE HYDROCHLORIDE 50 MG: 50 TABLET ORAL at 20:06

## 2020-07-08 RX ADMIN — OLANZAPINE 5 MG: 5 TABLET, FILM COATED ORAL at 20:06

## 2020-07-08 NOTE — PROGRESS NOTES
"   This patient visit is electronic.  The patient gave consent to be seen remotely, and when consent is given they understand that the consent allows for patient identifiable information to be sent to a third party as needed.   They may refuse to be seen remotely at any time. The electronic data is encrypted and password protected, and the patient has been advised of the potential risks to privacy not withstanding such measures.    INPATIENT PSYCHIATRIC PROGRESS NOTE    Name:  Cruz Mims  :  1985  MRN:  [id number]  Visit Number:  [id number]  Length of stay:  2    Behavioral Health Treatment Plan and Problem List: I have reviewed and approved the Behavioral Health Treatment Plan and Problem list.    SUBJECTIVE    CC/ Focus of exam: depression/ substance abuse    Patient's subjective status: \"Feeling good\"     INTERVAL HISTORY: Patient continues to be very restless yawning frequently during the interview moving constantly.  He states he slept \"excellent\" and requests to be restarted on bupropion.  \"I do good on 300 mg a day that Dr. Putnam prescribes\".  Apparently the patient has been to the [place] office [facility].  Patient okay as contacting his father for he plans to return to that household post hospital stating he hopes to get his own place eventually.  No active psychotic thought content demonstrated, no longer expressing any thoughts of harming himself.  Need to coordinate his discharge with his father if at all possible.    It is just possible he might abuse bupropion.    Depression rating 10/10 (contradicting his previous statement)   Anxiety rating 0/10  Sleep: \" Excellent\"  Withdrawal sx: None  Craving: Says       Review of Systems   Respiratory: Negative.    Cardiovascular: Negative.    Gastrointestinal: Negative.    Neurological: Negative.      Noted changes on EKG, patient totally asymptomatic, no.  Increase cardiac risk, this opinioned no consultation indicated .    OBJECTIVE    Temp:  " [97.5 °F (36.4 °C)-97.6 °F (36.4 °C)] 97.6 °F (36.4 °C)  Heart Rate:  [] 74  Resp:  [16-18] 16  BP: (122-129)/(74-78) 122/74    MENTAL STATUS EXAM:      Appearance:Casually dressed, good hygeine.   Cooperation:Cooperative  Psychomotor: No psychomotor agitation/retardation, No EPS, No motor tics  Speech-normal rate, amount.  Mood/Affect:  Appropriate  Thought Processes:  coherent  Thought Content:  mood congruent  Hallucination(s): none  Hopelessness: No  Optimistic:minimally  Suicidal Thoughts:   No  Suicidal Plan/Intent:  No  Homicidal Thoughts:  absent  Orientation: oriented x 3  Memory: recent intact    Lab Results (last 24 hours)     ** No results found for the last 24 hours. **           Imaging Results (Last 24 Hours)     ** No results found for the last 24 hours. **           ECG/EMG Results (most recent)     Procedure Component Value Units Date/Time    ECG 12 Lead [[id number]] Collected:  07/06/20 1632     Updated:  07/07/20 1630    Narrative:       Test Reason : Baseline Cardiac Status  Blood Pressure : **/** mmHG  Vent. Rate : 085 BPM     Atrial Rate : 085 BPM     P-R Int : 126 ms          QRS Dur : 086 ms      QT Int : 378 ms       P-R-T Axes : 070 069 057 degrees     QTc Int : 449 ms    Normal sinus rhythm  Cannot rule out Anterior infarct , age undetermined  Abnormal ECG  When compared with ECG of 11-MAR-2020 22:39,  No significant change was found  Confirmed by Neris Escobar ([id number]) on 7/7/2020 4:29:49 PM    Referred By:  HAIM           Confirmed By:Neris Escobar           ALLERGIES: Amoxicillin and Penicillins      Current Facility-Administered Medications:   •  acetaminophen (TYLENOL) tablet 650 mg, 650 mg, Oral, Q6H PRN, Michael Terry MD  •  aluminum-magnesium hydroxide-simethicone (MAALOX MAX) 400-400-40 MG/5ML suspension 15 mL, 15 mL, Oral, Q6H PRN, Michael Terry MD  •  benzonatate (TESSALON) capsule 100 mg, 100 mg, Oral, TID PRN, Michael Terry MD  •  famotidine (PEPCID) tablet 20  mg, 20 mg, Oral, BID PRN, Michael Terry MD  •  hydrOXYzine (ATARAX) tablet 50 mg, 50 mg, Oral, Q6H PRN, Michael Terry MD  •  ibuprofen (ADVIL,MOTRIN) tablet 400 mg, 400 mg, Oral, Q6H PRN, Michael Terry MD  •  loperamide (IMODIUM) capsule 2 mg, 2 mg, Oral, Q2H PRN, Michael Terry MD  •  magnesium hydroxide (MILK OF MAGNESIA) suspension 2400 mg/10mL 10 mL, 10 mL, Oral, Daily PRN, Michael Terry MD  •  OLANZapine (zyPREXA) tablet 5 mg, 5 mg, Oral, Nightly, Carlton Lozada MD, 5 mg at 07/07/20 2041  •  OLANZapine zydis (zyPREXA) disintegrating tablet 5 mg, 5 mg, Oral, TID PRN, Carlton Lozada MD  •  ondansetron (ZOFRAN) tablet 4 mg, 4 mg, Oral, Q6H PRN, Michael Terry MD  •  sodium chloride nasal spray 2 spray, 2 spray, Each Nare, PRN, Michael Terry MD  •  traZODone (DESYREL) tablet 50 mg, 50 mg, Oral, Nightly PRN, Michael Terry MD    ASSESSMENT & PLAN     Other recurrent depressiveers (CMS/HCC) disorder  Zyprexa, Remeron plus a supportive individual and group psychotherapeutic effort      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    Borderline intellectual disability  Incorporated into the treatment plan    Tetrahydrocannabinol (THC) use disorder, mild, abuse  Consider with the effort towards recovery.     Personality disorder with dependent traits  Incorporated into the treatment plan.    Special precautions: Special Precautions Level 3 (q15 min checks)     Behavioral Health Treatment Plan and Problem List: I have reviewed and approved the Behavioral Health Treatment Plan and Problem list.    I spent a total of 25 minutes in direct patient care including  18 minutes face to face with the patient assessment,  coordination of care, and counseling the patient on the current and follow-up treatment plans regarding his statuis.  Patient had no additional questions.  SHERLYN Lozada MD    Clinician:  Carlton Lozada MD  07/08/20  08:37    Dictated utilizing Dragon dictation

## 2020-07-08 NOTE — DISCHARGE INSTR - APPOINTMENTS
Lee AUGUSTIN  [address]   [facility] [address]  [phone]    Appointment:  7/14/2020 at 1:30 p.m

## 2020-07-08 NOTE — PLAN OF CARE
Problem: Patient Care Overview  Goal: Interprofessional Rounds/Family Conf  Outcome: Ongoing (interventions implemented as appropriate)  Flowsheets (Taken 7/7/2020 6690)  Participants: social work;patient;nursing;milieu/psych techs;psychiatrist  Summary: Treatment team staffing    1114:      Therapist attempted individual session with patient this date. He remains isolative to bed and minimally cooperative. He does tell therapist that he will follow up with [facility] and stay with his dad at discharge.  He signed consent for [facility] and his dad Aric Mims.  Patient minimally involved otherwise, and refused individual session this date.      Therapist contacted patient's father Aric Mims at [phone]: Arci was contacted this date.  Aric reports that patient has been doing a lot of drugs and snorting his Wellbutrin.  Patient possibly overdosed on it and had a seizure prior to arrival.  Patient lives there with his father, sneaks out of the house and gets drugs.  Hasn't been taking his medicine for a while.  Aric confirms that the patient can return to his home and identifies that the home is safe guarded from weapons.

## 2020-07-08 NOTE — PLAN OF CARE
Problem: Patient Care Overview  Goal: Plan of Care Review  Outcome: Ongoing (interventions implemented as appropriate)  Flowsheets (Taken 7/8/2020 9569)  Plan of Care Reviewed With: patient  Patient Agreement with Plan of Care: refuses to participate  Note:   Pt continues to isolate in room most of shift except to get up for snacks. Has slept well this shift.

## 2020-07-09 VITALS
HEIGHT: 70 IN | RESPIRATION RATE: 18 BRPM | WEIGHT: 164.2 LBS | DIASTOLIC BLOOD PRESSURE: 99 MMHG | TEMPERATURE: 97.3 F | BODY MASS INDEX: 23.51 KG/M2 | OXYGEN SATURATION: 97 % | SYSTOLIC BLOOD PRESSURE: 129 MMHG | HEART RATE: 120 BPM

## 2020-07-09 PROBLEM — R45.851 DEPRESSION WITH SUICIDAL IDEATION: Status: RESOLVED | Noted: 2020-07-06 | Resolved: 2020-07-09

## 2020-07-09 PROBLEM — F32.A DEPRESSION WITH SUICIDAL IDEATION: Status: RESOLVED | Noted: 2020-07-06 | Resolved: 2020-07-09

## 2020-07-09 PROCEDURE — 99239 HOSP IP/OBS DSCHRG MGMT >30: CPT | Performed by: PSYCHIATRY & NEUROLOGY

## 2020-07-09 RX ORDER — MIRTAZAPINE 15 MG/1
15 TABLET, FILM COATED ORAL NIGHTLY
Qty: 30 TABLET | Refills: 0 | Status: ON HOLD | OUTPATIENT
Start: 2020-07-09 | End: 2020-08-05 | Stop reason: SDUPTHER

## 2020-07-09 RX ORDER — OLANZAPINE 5 MG/1
5 TABLET ORAL NIGHTLY
Qty: 30 TABLET | Refills: 0 | Status: ON HOLD | OUTPATIENT
Start: 2020-07-09 | End: 2020-08-05 | Stop reason: SDUPTHER

## 2020-07-09 NOTE — DISCHARGE SUMMARY
This patient visit is electronic.  The patient gave consent to be seen remotely, and when consent is given they understand that the consent allows for patient identifiable information to be sent to a third party as needed.   They may refuse to be seen remotely at any time. The electronic data is encrypted and password protected, and the patient has been advised of the potential risks to privacy not withstanding such measures.      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         Hospital Course:  Patient was admitted for safety and stabilization and was placed on standard precautions.  Routine labs were checked.  Patient was assigned a masters level therapist and provided with an opportunity to participate in group and individual therapy on the unit.  Patient seen on a daily basis for evaluation and supportive therapy.  His thoughts of harming self or others cleared rapidly, his mood stabilized without evidence of a sustained depression nor were there any persistent psychotic symptoms beyond his initial day in the hospital.  Patient started on olanzapine and low-dose mirtazapine which was continued through his hospital stay.  Patient's preference for bupropion apparently associated with his abuse of this medication, see therapist July 8 note.  Patient to return to his father's house home, has an appointment at the [facility] clinic, see below for medications.  Patient once again had no interest in pursuit  of a residential or intensive outpatient recovery program.    Consults:   Consults     No orders found from 6/7/2020 to 7/7/2020.          Labs:  Lab Results (all)     None          Imaging:  Imaging Results (All)     None          Condition on Discharge:  improved    Prognosis: Guarded    Vital Signs  Temp:  [97.3 °F (36.3 °C)] 97.3 °F (36.3 °C)  Heart Rate:  [118-120] 120  Resp:  [18] 18  BP: (124-129)/(77-99) 129/99    Discharge Disposition  Home or Self Care    Discharge Medications     Discharge Medications      New Medications      Instructions Start Date   mirtazapine 15 MG tablet  Commonly known as:  REMERON   15 mg, Oral, Nightly      OLANZapine 5 MG tablet  Commonly known as:  zyPREXA   5 mg, Oral, Nightly         Stop These Medications    buPROPion  MG 24 hr tablet  Commonly known as:  WELLBUTRIN XL            Discharge Diet: Regular    Activity at Discharge: No restrictions    Follow-up Appointments: Patient to follow-[facility] at the [facility]          Carlton Lozada MD  07/09/20  08:38  Time spent with the discharge process >30 minutes.     Dictated utilizing Dragon dictation

## 2020-07-09 NOTE — PLAN OF CARE
Problem: Patient Care Overview  Goal: Plan of Care Review  Outcome: Ongoing (interventions implemented as appropriate)  Flowsheets (Taken 7/9/2020 6326)  Progress: improving  Plan of Care Reviewed With: patient  Note:   Pt was out of his room more. Interacting appropriately and respectfully. Has slept well this shift.

## 2020-07-09 NOTE — PROGRESS NOTES
1037:     Received update from PATEL Sinclair.  Patient needs [facility] home.  Reached patient's father Aric who also requested transport home for patient as he has a DUI class today and can't  patient himself.      Therapist contacted [facility] this date.      Patient denies SI/HI and acute symptoms.  He has been encouraged to abstain from al substance abuse.

## 2020-08-01 ENCOUNTER — HOSPITAL ENCOUNTER (INPATIENT)
Facility: HOSPITAL | Age: 35
LOS: 4 days | Discharge: HOME | End: 2020-08-05
Attending: PSYCHIATRY & NEUROLOGY | Admitting: PSYCHIATRY & NEUROLOGY

## 2020-08-01 ENCOUNTER — HOSPITAL ENCOUNTER (EMERGENCY)
Facility: HOSPITAL | Age: 35
Discharge: PSYCHIATRIC OR SUBSTANCE USE FACILITY | End: 2020-08-01
Attending: EMERGENCY MEDICINE | Admitting: EMERGENCY MEDICINE

## 2020-08-01 VITALS
SYSTOLIC BLOOD PRESSURE: 108 MMHG | TEMPERATURE: 98.1 F | HEIGHT: 68 IN | OXYGEN SATURATION: 96 % | HEART RATE: 79 BPM | DIASTOLIC BLOOD PRESSURE: 74 MMHG | BODY MASS INDEX: 24.25 KG/M2 | RESPIRATION RATE: 18 BRPM | WEIGHT: 160 LBS

## 2020-08-01 DIAGNOSIS — T14.91XA SUICIDAL BEHAVIOR WITH ATTEMPTED SELF-INJURY (HCC): Primary | ICD-10-CM

## 2020-08-01 PROBLEM — F32.3 MDD (MAJOR DEPRESSIVE DISORDER), SINGLE EPISODE, SEVERE WITH PSYCHOSIS (HCC): Status: ACTIVE | Noted: 2020-08-01

## 2020-08-01 LAB
6-ACETYL MORPHINE: NEGATIVE
ALBUMIN SERPL-MCNC: 4.64 G/DL (ref 3.5–5.2)
ALBUMIN/GLOB SERPL: 1.3 G/DL
ALP SERPL-CCNC: 92 U/L (ref 39–117)
ALT SERPL W P-5'-P-CCNC: 30 U/L (ref 1–41)
AMPHET+METHAMPHET UR QL: POSITIVE
ANION GAP SERPL CALCULATED.3IONS-SCNC: 22.6 MMOL/L (ref 5–15)
APAP SERPL-MCNC: <5 MCG/ML (ref 10–30)
AST SERPL-CCNC: 30 U/L (ref 1–40)
BARBITURATES UR QL SCN: NEGATIVE
BASOPHILS # BLD AUTO: 0.05 10*3/MM3 (ref 0–0.2)
BASOPHILS NFR BLD AUTO: 0.4 % (ref 0–1.5)
BENZODIAZ UR QL SCN: NEGATIVE
BILIRUB SERPL-MCNC: 0.4 MG/DL (ref 0–1.2)
BILIRUB UR QL STRIP: NEGATIVE
BUN SERPL-MCNC: 17 MG/DL (ref 6–20)
BUN/CREAT SERPL: 17.2 (ref 7–25)
BUPRENORPHINE SERPL-MCNC: NEGATIVE NG/ML
CALCIUM SPEC-SCNC: 9.3 MG/DL (ref 8.6–10.5)
CANNABINOIDS SERPL QL: POSITIVE
CHLORIDE SERPL-SCNC: 95 MMOL/L (ref 98–107)
CLARITY UR: CLEAR
CO2 SERPL-SCNC: 19.4 MMOL/L (ref 22–29)
COCAINE UR QL: NEGATIVE
COLOR UR: ABNORMAL
CREAT SERPL-MCNC: 0.99 MG/DL (ref 0.76–1.27)
DEPRECATED RDW RBC AUTO: 43.6 FL (ref 37–54)
EOSINOPHIL # BLD AUTO: 0.09 10*3/MM3 (ref 0–0.4)
EOSINOPHIL NFR BLD AUTO: 0.8 % (ref 0.3–6.2)
ERYTHROCYTE [DISTWIDTH] IN BLOOD BY AUTOMATED COUNT: 13.3 % (ref 12.3–15.4)
ETHANOL BLD-MCNC: <10 MG/DL (ref 0–10)
ETHANOL UR QL: <0.01 %
GFR SERPL CREATININE-BSD FRML MDRD: 87 ML/MIN/1.73
GLOBULIN UR ELPH-MCNC: 3.5 GM/DL
GLUCOSE SERPL-MCNC: 153 MG/DL (ref 65–99)
GLUCOSE UR STRIP-MCNC: NEGATIVE MG/DL
HCT VFR BLD AUTO: 47.3 % (ref 37.5–51)
HGB BLD-MCNC: 15.5 G/DL (ref 13–17.7)
HGB UR QL STRIP.AUTO: NEGATIVE
IMM GRANULOCYTES # BLD AUTO: 0.06 10*3/MM3 (ref 0–0.05)
IMM GRANULOCYTES NFR BLD AUTO: 0.5 % (ref 0–0.5)
KETONES UR QL STRIP: NEGATIVE
LEUKOCYTE ESTERASE UR QL STRIP.AUTO: NEGATIVE
LYMPHOCYTES # BLD AUTO: 3.11 10*3/MM3 (ref 0.7–3.1)
LYMPHOCYTES NFR BLD AUTO: 27.5 % (ref 19.6–45.3)
MAGNESIUM SERPL-MCNC: 2 MG/DL (ref 1.6–2.6)
MCH RBC QN AUTO: 29.2 PG (ref 26.6–33)
MCHC RBC AUTO-ENTMCNC: 32.8 G/DL (ref 31.5–35.7)
MCV RBC AUTO: 89.2 FL (ref 79–97)
METHADONE UR QL SCN: NEGATIVE
MONOCYTES # BLD AUTO: 0.76 10*3/MM3 (ref 0.1–0.9)
MONOCYTES NFR BLD AUTO: 6.7 % (ref 5–12)
NEUTROPHILS NFR BLD AUTO: 64.1 % (ref 42.7–76)
NEUTROPHILS NFR BLD AUTO: 7.22 10*3/MM3 (ref 1.7–7)
NITRITE UR QL STRIP: NEGATIVE
NRBC BLD AUTO-RTO: 0 /100 WBC (ref 0–0.2)
OPIATES UR QL: NEGATIVE
OXYCODONE UR QL SCN: NEGATIVE
PCP UR QL SCN: NEGATIVE
PH UR STRIP.AUTO: <=5 [PH] (ref 5–8)
PLATELET # BLD AUTO: 223 10*3/MM3 (ref 140–450)
PMV BLD AUTO: 11.4 FL (ref 6–12)
POTASSIUM SERPL-SCNC: 3.6 MMOL/L (ref 3.5–5.2)
PROT SERPL-MCNC: 8.1 G/DL (ref 6–8.5)
PROT UR QL STRIP: NEGATIVE
RBC # BLD AUTO: 5.3 10*6/MM3 (ref 4.14–5.8)
SALICYLATES SERPL-MCNC: <0.3 MG/DL
SODIUM SERPL-SCNC: 137 MMOL/L (ref 136–145)
SP GR UR STRIP: >1.03 (ref 1–1.03)
UROBILINOGEN UR QL STRIP: ABNORMAL
WBC # BLD AUTO: 11.29 10*3/MM3 (ref 3.4–10.8)

## 2020-08-01 PROCEDURE — 80307 DRUG TEST PRSMV CHEM ANLYZR: CPT | Performed by: EMERGENCY MEDICINE

## 2020-08-01 PROCEDURE — 80053 COMPREHEN METABOLIC PANEL: CPT | Performed by: EMERGENCY MEDICINE

## 2020-08-01 PROCEDURE — 36415 COLL VENOUS BLD VENIPUNCTURE: CPT

## 2020-08-01 PROCEDURE — 99284 EMERGENCY DEPT VISIT MOD MDM: CPT

## 2020-08-01 PROCEDURE — 83735 ASSAY OF MAGNESIUM: CPT | Performed by: EMERGENCY MEDICINE

## 2020-08-01 PROCEDURE — 85025 COMPLETE CBC W/AUTO DIFF WBC: CPT | Performed by: EMERGENCY MEDICINE

## 2020-08-01 PROCEDURE — 25010000002 ZIPRASIDONE MESYLATE PER 10 MG: Performed by: EMERGENCY MEDICINE

## 2020-08-01 PROCEDURE — 96372 THER/PROPH/DIAG INJ SC/IM: CPT

## 2020-08-01 PROCEDURE — 81003 URINALYSIS AUTO W/O SCOPE: CPT | Performed by: EMERGENCY MEDICINE

## 2020-08-01 RX ORDER — ONDANSETRON 4 MG/1
4 TABLET, FILM COATED ORAL EVERY 6 HOURS PRN
Status: DISCONTINUED | OUTPATIENT
Start: 2020-08-01 | End: 2020-08-05 | Stop reason: HOSPADM

## 2020-08-01 RX ORDER — ACETAMINOPHEN 325 MG/1
650 TABLET ORAL EVERY 6 HOURS PRN
Status: DISCONTINUED | OUTPATIENT
Start: 2020-08-01 | End: 2020-08-05 | Stop reason: HOSPADM

## 2020-08-01 RX ORDER — BENZONATATE 100 MG/1
100 CAPSULE ORAL 3 TIMES DAILY PRN
Status: DISCONTINUED | OUTPATIENT
Start: 2020-08-01 | End: 2020-08-05 | Stop reason: HOSPADM

## 2020-08-01 RX ORDER — IBUPROFEN 400 MG/1
400 TABLET ORAL EVERY 6 HOURS PRN
Status: DISCONTINUED | OUTPATIENT
Start: 2020-08-01 | End: 2020-08-05 | Stop reason: HOSPADM

## 2020-08-01 RX ORDER — MIRTAZAPINE 15 MG/1
15 TABLET, FILM COATED ORAL NIGHTLY
Status: CANCELLED | OUTPATIENT
Start: 2020-08-01

## 2020-08-01 RX ORDER — OLANZAPINE 5 MG/1
5 TABLET ORAL NIGHTLY
Status: CANCELLED | OUTPATIENT
Start: 2020-08-01

## 2020-08-01 RX ORDER — BENZTROPINE MESYLATE 1 MG/1
2 TABLET ORAL ONCE AS NEEDED
Status: DISCONTINUED | OUTPATIENT
Start: 2020-08-01 | End: 2020-08-05 | Stop reason: HOSPADM

## 2020-08-01 RX ORDER — BENZTROPINE MESYLATE 1 MG/ML
1 INJECTION INTRAMUSCULAR; INTRAVENOUS ONCE AS NEEDED
Status: DISCONTINUED | OUTPATIENT
Start: 2020-08-01 | End: 2020-08-05 | Stop reason: HOSPADM

## 2020-08-01 RX ORDER — HYDROXYZINE 50 MG/1
50 TABLET, FILM COATED ORAL EVERY 6 HOURS PRN
Status: DISCONTINUED | OUTPATIENT
Start: 2020-08-01 | End: 2020-08-05 | Stop reason: HOSPADM

## 2020-08-01 RX ORDER — FAMOTIDINE 20 MG/1
20 TABLET, FILM COATED ORAL 2 TIMES DAILY PRN
Status: DISCONTINUED | OUTPATIENT
Start: 2020-08-01 | End: 2020-08-05 | Stop reason: HOSPADM

## 2020-08-01 RX ORDER — ALUMINA, MAGNESIA, AND SIMETHICONE 2400; 2400; 240 MG/30ML; MG/30ML; MG/30ML
15 SUSPENSION ORAL EVERY 6 HOURS PRN
Status: DISCONTINUED | OUTPATIENT
Start: 2020-08-01 | End: 2020-08-05 | Stop reason: HOSPADM

## 2020-08-01 RX ORDER — ECHINACEA PURPUREA EXTRACT 125 MG
2 TABLET ORAL AS NEEDED
Status: DISCONTINUED | OUTPATIENT
Start: 2020-08-01 | End: 2020-08-05 | Stop reason: HOSPADM

## 2020-08-01 RX ORDER — TRAZODONE HYDROCHLORIDE 50 MG/1
50 TABLET ORAL NIGHTLY PRN
Status: DISCONTINUED | OUTPATIENT
Start: 2020-08-01 | End: 2020-08-05 | Stop reason: HOSPADM

## 2020-08-01 RX ORDER — NICOTINE 21 MG/24HR
1 PATCH, TRANSDERMAL 24 HOURS TRANSDERMAL
Status: DISCONTINUED | OUTPATIENT
Start: 2020-08-02 | End: 2020-08-05 | Stop reason: HOSPADM

## 2020-08-01 RX ORDER — LOPERAMIDE HYDROCHLORIDE 2 MG/1
2 CAPSULE ORAL
Status: DISCONTINUED | OUTPATIENT
Start: 2020-08-01 | End: 2020-08-05 | Stop reason: HOSPADM

## 2020-08-01 RX ADMIN — WATER 10 MG: 1 INJECTION INTRAMUSCULAR; INTRAVENOUS; SUBCUTANEOUS at 21:12

## 2020-08-02 PROCEDURE — 99223 1ST HOSP IP/OBS HIGH 75: CPT | Performed by: PSYCHIATRY & NEUROLOGY

## 2020-08-02 PROCEDURE — 93005 ELECTROCARDIOGRAM TRACING: CPT | Performed by: PSYCHIATRY & NEUROLOGY

## 2020-08-02 PROCEDURE — 93010 ELECTROCARDIOGRAM REPORT: CPT | Performed by: INTERNAL MEDICINE

## 2020-08-02 RX ORDER — OLANZAPINE 5 MG/1
5 TABLET ORAL NIGHTLY
Status: DISCONTINUED | OUTPATIENT
Start: 2020-08-02 | End: 2020-08-03

## 2020-08-02 RX ORDER — MIRTAZAPINE 15 MG/1
15 TABLET, FILM COATED ORAL NIGHTLY
Status: DISCONTINUED | OUTPATIENT
Start: 2020-08-02 | End: 2020-08-05 | Stop reason: HOSPADM

## 2020-08-02 RX ADMIN — OLANZAPINE 5 MG: 5 TABLET, FILM COATED ORAL at 21:35

## 2020-08-02 RX ADMIN — MIRTAZAPINE 15 MG: 15 TABLET, FILM COATED ORAL at 21:35

## 2020-08-02 NOTE — NURSING NOTE
Urine sent to lab. Pt is resting at this time and is not as restless as to when he came in. Safety precautions maintained.

## 2020-08-02 NOTE — NURSING NOTE
Called and spoke to Dr. Stearns. Intake information and labs discussed. Instructed to admit to adult psych routine orders.     Information verbalized. Rbvox2.

## 2020-08-02 NOTE — PLAN OF CARE
Patient rates anxiety 7 and depression 7, denies thoughts of harming self and others, and denies hallucinations reports was having visual hallucinations before coming to the hospital.

## 2020-08-02 NOTE — NURSING NOTE
Intake assessment completed. Patient reports that he had his dad bring him here. Patient is noted be restless and unable to remain still. Very bad body odor noted and disheveled appearance.  Patient reports that he is suicidal. Vague with specific details but states he did not think of a plan just that he wishes he was not born anymore. Patient reports that he has been not able to sleep and has been doing just a little bit of meth. Pt unsure of last use or how much. Poor historian. When asked about stressors or why he is suicidal the patient if vague and says I don't know. When asked about drug use, the patient states I did a little meth. Unable to recall further details.ED provider made aware.
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
## 2020-08-02 NOTE — PLAN OF CARE
Problem: Patient Care Overview  Goal: Plan of Care Review  Outcome: Ongoing (interventions implemented as appropriate)  Flowsheets (Taken 8/2/2020 6310)  Progress: no change  Plan of Care Reviewed With: patient  Note:   Pt is new admission received this shift. Has rested quietly in bed all night.

## 2020-08-03 PROCEDURE — 99232 SBSQ HOSP IP/OBS MODERATE 35: CPT | Performed by: PSYCHIATRY & NEUROLOGY

## 2020-08-03 RX ORDER — OLANZAPINE 10 MG/1
10 TABLET ORAL NIGHTLY
Status: DISCONTINUED | OUTPATIENT
Start: 2020-08-03 | End: 2020-08-05 | Stop reason: HOSPADM

## 2020-08-03 RX ADMIN — MIRTAZAPINE 15 MG: 15 TABLET, FILM COATED ORAL at 20:07

## 2020-08-03 RX ADMIN — OLANZAPINE 10 MG: 10 TABLET, FILM COATED ORAL at 20:07

## 2020-08-03 NOTE — PLAN OF CARE
Problem: Patient Care Overview  Goal: Plan of Care Review  Outcome: Ongoing (interventions implemented as appropriate)  Flowsheets (Taken 8/3/2020 1512)  Progress: no change  Plan of Care Reviewed With: patient  Patient Agreement with Plan of Care: agrees  Note:   Patient is alert but confused about month or year today. Patient re-orients without difficulty. Patient out of his room for meals and medications but isolates other than that. Patient appetite and sleep are good with no concerns voiced. No acute distress noted, will continue to monitor.

## 2020-08-03 NOTE — PROGRESS NOTES
"INPATIENT PSYCHIATRIC PROGRESS NOTE    Name:  Cruz Mims  :  1985  MRN:  [id number]  Visit Number:  [id number]  Length of stay:  2    SUBJECTIVE  CC/Focus of Exam: Psychosis    INTERVAL HISTORY:  Patient continues to be bizarre, detached, and appropriate.  Had to be placed in private room for continued inappropriate behavior.  Fluctuating between anxiety, agitation and fatigue.  Likely recovering from methamphetamine.  We will continue to monitor    Depression rating 6/10  Anxiety rating 7/10  Sleep: Fair  Withdrawal sx: Fatigue  Craving: 3/10    Review of Systems   Constitutional: Negative.    Respiratory: Negative.    Cardiovascular: Negative.    Gastrointestinal: Negative.    Musculoskeletal: Negative.    Psychiatric/Behavioral: Positive for behavioral problems, confusion, decreased concentration, dysphoric mood and hallucinations. The patient is nervous/anxious.        OBJECTIVE    Temp:  [97.7 °F (36.5 °C)-99.6 °F (37.6 °C)] 97.7 °F (36.5 °C)  Heart Rate:  [71-92] 71  Resp:  [18] 18  BP: (101-126)/(58-73) 107/67    MENTAL STATUS EXAM:  Appearance:Casually dressed, poor hygeine.   Cooperation: Uncooperative  Psychomotor: +psychomotor agitation/retardation, No EPS, No motor tics  Speech-decreased rate, amount.  Mood \" same\"   Affect-congruent, restricted, inappropriate  Thought Content- delusional material present  Thought process-disorganized  Suicidality: No SI  Homicidality: No HI  Perception: +AH/VH  Insight-fair   Judgement-fair    Lab Results (last 24 hours)     ** No results found for the last 24 hours. **             Imaging Results (Last 24 Hours)     ** No results found for the last 24 hours. **             ECG/EMG Results (most recent)     Procedure Component Value Units Date/Time    ECG 12 Lead [[id number]] Collected:  20     Updated:  20    Narrative:       Test Reason : Baseline Cardiac Status  Blood Pressure : **/** mmHG  Vent. Rate : 074 BPM     Atrial Rate : " 074 BPM     P-R Int : 122 ms          QRS Dur : 086 ms      QT Int : 398 ms       P-R-T Axes : 067 066 067 degrees     QTc Int : 441 ms    Normal sinus rhythm  Normal ECG  When compared with ECG of 06-JUL-2020 16:32,  No significant change was found    Referred By:             Confirmed By:            ALLERGIES: Amoxicillin and Penicillins      Current Facility-Administered Medications:   •  acetaminophen (TYLENOL) tablet 650 mg, 650 mg, Oral, Q6H PRN, Antwan Stearns MD  •  aluminum-magnesium hydroxide-simethicone (MAALOX MAX) 400-400-40 MG/5ML suspension 15 mL, 15 mL, Oral, Q6H PRN, Antwan Stearns MD  •  benzonatate (TESSALON) capsule 100 mg, 100 mg, Oral, TID PRN, Antwan Stearns MD  •  benztropine (COGENTIN) tablet 2 mg, 2 mg, Oral, Once PRN **OR** benztropine (COGENTIN) injection 1 mg, 1 mg, Intramuscular, Once PRN, Antwan Stearns MD  •  famotidine (PEPCID) tablet 20 mg, 20 mg, Oral, BID PRN, Antwan Stearns MD  •  hydrOXYzine (ATARAX) tablet 50 mg, 50 mg, Oral, Q6H PRN, Antwan Stearns MD  •  ibuprofen (ADVIL,MOTRIN) tablet 400 mg, 400 mg, Oral, Q6H PRN, Antwan Stearns MD  •  loperamide (IMODIUM) capsule 2 mg, 2 mg, Oral, Q2H PRN, Antwan Stearns MD  •  magnesium hydroxide (MILK OF MAGNESIA) suspension 2400 mg/10mL 10 mL, 10 mL, Oral, Daily PRN, Antwan Stearns MD  •  mirtazapine (REMERON) tablet 15 mg, 15 mg, Oral, Nightly, Antwan Stearns MD, 15 mg at 08/02/20 2135  •  nicotine (NICODERM CQ) 21 MG/24HR patch 1 patch, 1 patch, Transdermal, Q24H, Antwan Stearns MD  •  OLANZapine (zyPREXA) tablet 10 mg, 10 mg, Oral, Nightly, Antwan Stearns MD  •  ondansetron (ZOFRAN) tablet 4 mg, 4 mg, Oral, Q6H PRN, Antwan Stearns MD  •  sodium chloride nasal spray 2 spray, 2 spray, Each Nare, PRN, Antwan Stearns MD  •  traZODone (DESYREL) tablet 50 mg, 50 mg, Oral, Nightly PRN, Antwan Stearns MD  •  ziprasidone (GEODON) 10 mg in sterile water (preservative free) 0.5 mL injection, 10 mg,  Intramuscular, Q6H PRN, Antwan Stearns MD    Reviewed chart, notes, vitals, labs and EKG personally    ASSESSMENT & PLAN:      MDD (major depressive disorder), single episode, severe with psychosis (CMS/HCC)    Methamphetamine-induced psychotic disorder  -Patient with increasing paranoia, hallucinations, delusions and SI in the setting of methamphetamine use.  Admit for crisis stabilization  -Patient may have underlying psychotic disorder as well as he has had some improvement on antipsychotics in the past  -Increase olanzapine to 10 mg nightly  -Refer to rehab     Unspecified depressive disorder  -Resumed mirtazapine 15 mg nightly  -Ascertain outpatient follow-up care     Methamphetamine use disorder, severe, dependence  -Patient with recurrent methamphetamine use with increasing severity  -Supportive care  -Refer to rehab     Cannabis use disorder, severe, dependence  -Supportive care  -Refer to rehab     Hepatitis C  -Hepatitis panel positive for hepatitis C antibody on March 13, 2020  -No sign of liver dysfunction from admission labs at this time     The patient has been admitted for safety and stabilization.  Patient will be monitored for suicidality daily and maintained on Special Precautions Level 3 (q15 min checks) .  The patient will have individual and group therapy with a master's level therapist. A master treatment plan will be developed and agreed upon by the patient and his/her treatment team.  The patient's estimated length of stay in the hospital is 5-7 days.     Special precautions: Special Precautions Level 3 (q15 min checks)     Behavioral Health Treatment Plan and Problem List: I have reviewed and approved the Behavioral Health Treatment Plan and Problem list.  The patient has had a chance to review and agrees with the treatment plan.     Clinician:  Antwan Stearns MD  08/03/20  11:41

## 2020-08-03 NOTE — PLAN OF CARE
Problem: Patient Care Overview  Goal: Plan of Care Review  Outcome: Ongoing (interventions implemented as appropriate)  Flowsheets (Taken 8/2/2020 2147)  Progress: no change  Plan of Care Reviewed With: patient  Patient Agreement with Plan of Care: agrees  Note:   Patient rates anxiety and depression 5/10. He denies si, hi, and avh. He is calm and cooperative.

## 2020-08-03 NOTE — PLAN OF CARE
Problem: Patient Care Overview  Goal: Plan of Care Review  Flowsheets (Taken 8/3/2020 1817)  Consent Given to Review Plan with: Refused.  Progress: no change  Plan of Care Reviewed With: patient  Patient Agreement with Plan of Care: agrees  Outcome Summary: Completed initial assessment, discussed alternative aftercare resources and expectations of treatment; reviewed treatment plan.     Problem: Patient Care Overview  Goal: Individualization and Mutuality  Flowsheets (Taken 8/3/2020 1817)  Patient Personal Strengths: expressive of emotions; expressive of needs; motivated for treatment; resilient; resourceful  Patient Vulnerabilities: Ineffective coping skills, poor insight  Patient Specific Goals (Include Timeframe): Patient to identify 3 healthy coping skills, complete crisis safety planning, and deny all SI, HI, and AVH.  Patient Specific Interventions: Patient to access psychiatric evaluation, medication management, individual and group CBT therapy sessions during admission.  How Would You and/or Your Support Person Like to Participate in Your Care?: Patient agreeable and consented for guardian/sister to be involved with treatment.  What Anxieties, Fears, Concerns, or Questions Do You Have About Your Care?: None  Patient Specific Preferences: Mood stabilization     Problem: Patient Care Overview  Goal: Discharge Needs Assessment  Flowsheets (Taken 8/3/2020 1817)  Outpatient/Agency/Support Group Needs: outpatient counseling; outpatient medication management; outpatient psychiatric care (specify)  Discharge Coordination/Progress: Patient anticipated to return home and have aftercare with [facility] upon discharge.  He has insurance for medications.  Transportation Anticipated: public transportation  Anticipated Discharge Disposition: home or self-care  Transportation Concerns: car, none  Current Discharge Risk: psychiatric illness  Concerns to be Addressed: coping/stress; decision making; discharge planning;  mental health; suicidal; other (see comments) (Bizarre behavior)  Readmission Within the Last 30 Days: no previous admission in last 30 days  Patient/Family Anticipated Services at Transition: mental health services; outpatient care  Patient's Choice of Community Agency(s): Anticipate [facility] referral.  Patient/Family Anticipates Transition to: home with family     Problem: Patient Care Overview  Goal: Interprofessional Rounds/Family Conf  Flowsheets (Taken 8/3/2020 1817)  Participants: social work; milieu/psych techs; nursing; psychiatrist  Summary: Therapist to staff with treatment team.     Problem: Overarching Goals (Adult)  Goal: Optimized Coping Skills in Response to Life Stressors  Intervention: Promote Effective Coping Strategies  Flowsheets (Taken 8/3/2020 1817)  Supportive Measures: active listening utilized; counseling provided; problem solving facilitated; verbalization of feelings encouraged; relaxation techniques promoted; self-care encouraged     Problem: Overarching Goals (Adult)  Goal: Develops/Participates in Therapeutic [place] to Support Successful Transition  Intervention: Foster Therapeutic [place]  Flowsheets (Taken 8/3/2020 1817)  Trust Relationship/Rapport: care explained; choices provided; thoughts/feelings acknowledged; emotional support provided; empathic listening provided; questions answered; questions encouraged; reassurance provided     Problem: Overarching Goals (Adult)  Goal: Develops/Participates in Therapeutic [place] to Support Successful Transition  Intervention: Mutually Develop Transition Plan  Flowsheets (Taken 8/3/2020 1817)  Transition Support: community resources reviewed; crisis management plan promoted; follow-up care discussed       DATA:         Therapist met individually with patient this date to introduce role and to discuss hospitalization expectations. Patient agreeableOlamide Arroyo is a 35 year old single,  male living in [facility].  He  "presents as voluntary admit with reports of suicidal ideations and psychosis.  Patient presents with acute increase in depression.  He was unable to identify stressor and stated he was \"depressed about everything in life.\"  Patient UDS positive for amphetamine and THC.  He reported sometimes using meth and smoking pot but unable to provided details.  Patient endorsed having auditory and visual hallucinations prior to coming to the hospital.  He denied AVH at present to therapist.  Patient noted to receive IM injection for agitation while in intake and increasing psychotic.  Patient reported he sleeps all day and all night, that he always feels tired.  Patient was poor historian.  He has several prior [facility] admissions with diagnosis of Schizoaffective disorder.  Patient endorsed death wishes to therapist.  He agreed to seek staff if needed for safety planning.  He reported his father to be positive support and agreeable to return home upon discharge.  Patient denied legal issues.  Patient seemed restless and lacked focus.  He was observed to stare blankly.  He reported that he ran out of his Wellbutrin recently and that this medicine is helpful.  Patient noted to snort Wellbutrin in the past per chart review.  Patient is admitted for safety and stabilization.    Patient consented for [facility].    Patient consented for father Aric Mims to be involved with treatment.  Therapist unable to reach; attempts will be ongoing.    Patient educated on social distancing and frequent hand washing during COVID-19.     Clinical Maneuvering/Intervention:     Therapist assisted patient in processing above session content; acknowledged and normalized patient’s thoughts, feelings, and concerns.  Discussed the therapist/patient relationship and explain the parameters and limitations of relative confidentiality.  Also discussed the importance of active participation, and honesty to the treatment process.  Encouraged " the patient to discuss/vent their feelings, frustrations, and fears concerning their ongoing medical issues and validated their feelings.     Discussed the importance of finding enjoyable activities and coping skills that the patient can engage in a regular basis. Discussed healthy coping skills such as distraction, self love, grounding, thought challenges/reframing, etc.  Provided patient with list of healthy coping skills this date. Discussed the importance of medication compliance.  Praised the patient for seeking help and spent the majority of the session building rapport.       Allowed patient to freely discuss issues without interruption or judgment. Provided safe, confidential environment to facilitate the development of positive therapeutic relationship and encourage open, honest communication.      Therapist addressed discharge safety planning this date. Assisted patient in identifying risk factors which would indicate the need for higher level of care after discharge;  including thoughts to harm self or others and/or self-harming behavior. Encouraged patient to call 911, or present to the nearest emergency room should any of these events occur. Discussed crisis intervention services and means to access.  Encouraged securing any objects of harm.       Therapist completed integrated summary, treatment plan, and initiated social history this date.  Therapist is strongly encouraging family involvement in treatment.       ASSESSMENT:      The patient displayed restricted affect and depressed mood.  He reported death wishes and denied SI or plan to therapist.  Patient denied HI and AVH.  He was oriented to being in the hospital.  Patient displayed poor insight and appeared disheveled.  He reported sleeping all day and night.  He reported fair appetite.     PLAN:       Patient to remain hospitalized this date.     Treatment team will focus efforts on stabilizing patient's acute symptoms while providing education  on healthy coping and crisis management to reduce hospitalizations.   Patient requires daily psychiatrist evaluation and 24/7 nursing supervision to promote patient  safety.     Therapist will offer 1-4 individual sessions, 1 therapy group daily, family education, and appropriate referral.    Therapist recommends rehab referral, however patient declined.  He consented for [facility].  He denied transportation needs.  Patient to return home upon discharge.

## 2020-08-04 PROCEDURE — 99232 SBSQ HOSP IP/OBS MODERATE 35: CPT | Performed by: PSYCHIATRY & NEUROLOGY

## 2020-08-04 RX ADMIN — OLANZAPINE 10 MG: 10 TABLET, FILM COATED ORAL at 20:21

## 2020-08-04 RX ADMIN — MIRTAZAPINE 15 MG: 15 TABLET, FILM COATED ORAL at 20:20

## 2020-08-04 NOTE — PLAN OF CARE
Problem: Patient Care Overview  Goal: Plan of Care Review  Outcome: Ongoing (interventions implemented as appropriate)  Flowsheets  Taken 8/4/2020 1336  Progress: improving  Taken 8/4/2020 0711  Plan of Care Reviewed With: patient  Patient Agreement with Plan of Care: agrees  Note:   PATIENT DENIES ANY PROBLEMS THIS SHIFT. NO S/S OF ACUTE DISTRESS NOTED. NO NEW ORDERS NOTED. PATIENT MAY D/C ON 8/5/20.

## 2020-08-04 NOTE — PROGRESS NOTES
"   This patient visit is electronic.  The patient gave consent to be seen remotely, and when consent is given they understand that the consent allows for patient identifiable information to be sent to a third party as needed.   They may refuse to be seen remotely at any time. The electronic data is encrypted and password protected, and the patient has been advised of the potential risks to privacy not withstanding such measures.    INPATIENT PSYCHIATRIC PROGRESS NOTE    Name:  Cruz Mims  :  1985  MRN:  [id number]  Visit Number:  [id number]  Length of stay:  3    Behavioral Health Treatment Plan and Problem List: I have reviewed and approved the Behavioral Health Treatment Plan and Problem list.    SUBJECTIVE    CC/ Focus of exam: Depression/psychosis/methamphetamine abuse    Patient's subjective status: \"Doing better\"    INTERVAL HISTORY: Chart reviewed.  Seems the story has not changed from prior admissions, patient presenting depressed and threatening self-harm question possible psychosis with paranoia, all cleared today.  Patient states his plans for the future is to \"go to [facility] and stay on my medicines\".  Track record extremely poor for treatment compliance.  Will anticipate discharge tomorrow if continues to do well today..    No interest in residential treatment for chemical dependency.    Has a follow-up appointment already arranged:  [place] [id number] [phone]   2020 at 10:15am with Krysta.     Depression rating 2/10  Anxiety rating 2/10  Sleep: Slept through the night  Withdrawal sx: None  Craving: Denied     Review of Systems   Respiratory: Negative.    Cardiovascular: Negative.    Gastrointestinal: Negative.    Musculoskeletal: Negative.    Neurological: Negative.          OBJECTIVE    Temp:  [97.8 °F (36.6 °C)-98.3 °F (36.8 °C)] 98.3 °F (36.8 °C)  Heart Rate:  [66-76] 66  Resp:  [18] 18  BP: (116-127)/(66-70) 116/70    MENTAL " STATUS EXAM:      Appearance:Casually dressed, good hygeine.   Cooperation:Cooperative  Psychomotor: No psychomotor agitation/retardation, No EPS, No motor tics  Speech-normal rate, amount.  Mood/Affect:  Appropriate  Thought Processes:  coherent  Thought Content:  mood congruent  Hallucination(s): none  Hopelessness: No  Optimistic:Yes  Suicidal Thoughts:   No  Suicidal Plan/Intent:  No  Homicidal Thoughts:  absent  Orientation: oriented x 3  Memory: recent intact    Lab Results (last 24 hours)     ** No results found for the last 24 hours. **           Imaging Results (Last 24 Hours)     ** No results found for the last 24 hours. **           ECG/EMG Results (most recent)     Procedure Component Value Units Date/Time    ECG 12 Lead [[id number]] Collected:  08/02/20 0812     Updated:  08/03/20 1322    Narrative:       Test Reason : Baseline Cardiac Status  Blood Pressure : **/** mmHG  Vent. Rate : 074 BPM     Atrial Rate : 074 BPM     P-R Int : 122 ms          QRS Dur : 086 ms      QT Int : 398 ms       P-R-T Axes : 067 066 067 degrees     QTc Int : 441 ms    Normal sinus rhythm  Normal ECG  When compared with ECG of 06-JUL-2020 16:32,  No significant change was found  Confirmed by Salo Roberts ([id number]) on 8/3/2020 1:22:30 PM    Referred By:             Confirmed By:Salo Roberts           ALLERGIES: Amoxicillin and Penicillins      Current Facility-Administered Medications:   •  acetaminophen (TYLENOL) tablet 650 mg, 650 mg, Oral, Q6H PRN, Antwan Stearns MD  •  aluminum-magnesium hydroxide-simethicone (MAALOX MAX) 400-400-40 MG/5ML suspension 15 mL, 15 mL, Oral, Q6H PRN, Antwan Stearns MD  •  benzonatate (TESSALON) capsule 100 mg, 100 mg, Oral, TID PRN, Antwan Stearns MD  •  benztropine (COGENTIN) tablet 2 mg, 2 mg, Oral, Once PRN **OR** benztropine (COGENTIN) injection 1 mg, 1 mg, Intramuscular, Once PRN, Antwan Stearns MD  •  famotidine (PEPCID) tablet 20 mg, 20 mg, Oral, BID PRN, Antwan Stearns MD  •   hydrOXYzine (ATARAX) tablet 50 mg, 50 mg, Oral, Q6H PRN, Antwan Stearns MD  •  ibuprofen (ADVIL,MOTRIN) tablet 400 mg, 400 mg, Oral, Q6H PRN, Antwan Stearns MD  •  loperamide (IMODIUM) capsule 2 mg, 2 mg, Oral, Q2H PRN, Antwan Stearns MD  •  magnesium hydroxide (MILK OF MAGNESIA) suspension 2400 mg/10mL 10 mL, 10 mL, Oral, Daily PRN, Antwan Stearns MD  •  mirtazapine (REMERON) tablet 15 mg, 15 mg, Oral, Nightly, Antwan Stearns MD, 15 mg at 08/03/20 2007  •  nicotine (NICODERM CQ) 21 MG/24HR patch 1 patch, 1 patch, Transdermal, Q24H, Antwan Stearns MD  •  OLANZapine (zyPREXA) tablet 10 mg, 10 mg, Oral, Nightly, Antwan Stearns MD, 10 mg at 08/03/20 2007  •  ondansetron (ZOFRAN) tablet 4 mg, 4 mg, Oral, Q6H PRN, Antwan Stearns MD  •  sodium chloride nasal spray 2 spray, 2 spray, Each Nare, PRN, Antwan Stearns MD  •  traZODone (DESYREL) tablet 50 mg, 50 mg, Oral, Nightly PRN, Antwan Stearns MD  •  ziprasidone (GEODON) 10 mg in sterile water (preservative free) 0.5 mL injection, 10 mg, Intramuscular, Q6H PRN, Antwan Stearns MD    ASSESSMENT & PLAN    MDD (major depressive disorder), recurrent, severe with psychosis (CMS/HCC)     Methamphetamine-induced psychotic disorder  -Patient with increasing paranoia, hallucinations, delusions and SI in the setting of methamphetamine use.  Admit for crisis stabilization  -Patient may have underlying psychotic disorder as well as he has had some improvement on antipsychotics in the past  -Increase olanzapine to 10 mg nightly  -Refer to rehab        Methamphetamine use disorder, severe, dependence  -Patient with recurrent methamphetamine use with increasing severity  -Supportive care  -Refer to rehab     Cannabis use disorder, severe, dependence  -Supportive care  -Refer to rehab     Hepatitis C  -Hepatitis panel positive for hepatitis C antibody on March 13, 2020  -No sign of liver dysfunction from admission labs at this time      Special precautions: Special  Precautions Level 3 (q15 min checks)     Behavioral Health Treatment Plan and Problem List: I have reviewed and approved the Behavioral Health Treatment Plan and Problem list.    I spent a total of 28 minutes in direct patient care including 17 minutes face to face with the patient assessment, coordination of care, and counseling the patient on the current and follow-up treatment plans regarding depression. Answered patient questions regarding medication..    SHERLYN Lozada MD    Clinician:  Carlton Lozada MD  08/04/20  09:31    Dictated utilizing Dragon dictation

## 2020-08-04 NOTE — PROGRESS NOTES
Patient has been scheduled the following appointment:     [address] [address]  [phone]    August 14 2020 at 10:15am with Krysta.

## 2020-08-04 NOTE — PLAN OF CARE
Problem: Patient Care Overview  Goal: Interprofessional Rounds/Family Conf  Outcome: Ongoing (interventions implemented as appropriate)  Flowsheets (Taken 8/4/2020 9808)  Participants: milieu/psych techs; nursing; patient; psychiatrist; social work  Summary: Discussed patients case with nursing staff and reviewed the psychiatrist assessment.  Patient remains sleeping in bed most of the day today.       Patient has reported that he is feeling better today.  He has remained in bed most of the day today, sleeping.  He is denying suicidal ideation and denying homicidal ideation.  He is planning to return home upon stabilization and continue outpatient with [facility], which has been scheduled.  Patient has been minimally cooperative in therapy as he is most often sleeping resulting from several days/nights awake prior to admission related to his methamphetamine use.

## 2020-08-04 NOTE — PLAN OF CARE
Problem: Patient Care Overview  Goal: Plan of Care Review  Outcome: Ongoing (interventions implemented as appropriate)  Flowsheets  Taken 8/4/2020 0449  Progress: no change  Outcome Summary: Pt disoriented to time, isolates to room. Denies anxiety depression SI HI AVH.  Taken 8/3/2020 2114  Plan of Care Reviewed With: patient  Patient Agreement with Plan of Care: agrees

## 2020-08-05 VITALS
BODY MASS INDEX: 24.25 KG/M2 | HEIGHT: 68 IN | HEART RATE: 88 BPM | OXYGEN SATURATION: 97 % | DIASTOLIC BLOOD PRESSURE: 87 MMHG | TEMPERATURE: 97.6 F | RESPIRATION RATE: 18 BRPM | WEIGHT: 160 LBS | SYSTOLIC BLOOD PRESSURE: 136 MMHG

## 2020-08-05 PROCEDURE — 99239 HOSP IP/OBS DSCHRG MGMT >30: CPT | Performed by: PSYCHIATRY & NEUROLOGY

## 2020-08-05 RX ORDER — MIRTAZAPINE 15 MG/1
15 TABLET, FILM COATED ORAL NIGHTLY
Qty: 30 TABLET | Refills: 0 | Status: ON HOLD | OUTPATIENT
Start: 2020-08-05 | End: 2020-10-06

## 2020-08-05 RX ORDER — OLANZAPINE 5 MG/1
5 TABLET ORAL NIGHTLY
Qty: 30 TABLET | Refills: 0 | Status: ON HOLD | OUTPATIENT
Start: 2020-08-05 | End: 2020-10-06

## 2020-08-05 RX ORDER — OLANZAPINE 10 MG/1
10 TABLET ORAL NIGHTLY
Qty: 30 TABLET | Refills: 0 | Status: SHIPPED | OUTPATIENT
Start: 2020-08-05 | End: 2020-08-05 | Stop reason: HOSPADM

## 2020-08-05 NOTE — DISCHARGE SUMMARY
This patient visit is electronic.  The patient gave consent to be seen remotely, and when consent is given they understand that the consent allows for patient identifiable information to be sent to a third party as needed.   They may refuse to be seen remotely at any time. The electronic data is encrypted and password protected, and the patient has been advised of the potential risks to privacy not withstanding such measures.      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         Hospital Course:  Patient was admitted for safety and stabilization and was placed on standard precautions.  Routine labs were checked.  Patient was assigned a masters level therapist and provided with an opportunity to participate in group and individual therapy on the unit.  Patient seen on a daily basis for evaluation and supportive therapy.  Patient started back on the olanzapine mirtazapine combination with a rapid resolution of his psychotic agitated depressed state.  By Tuesday, August 4 he was essentially at his baseline clear of any thoughts of harming self or others.  Discharged office visit to follow-up after the [place] office of  [facility] with an appointment on August 14.  See below for medications.    Consults:   Consults     No orders found from 7/3/2020 to 8/2/2020.          Labs:  Lab Results (all)     None          Imaging:  Imaging Results (All)     None          Condition on Discharge:   improved    Prognosis:     Vital Signs  Temp:  [97.6 °F (36.4 °C)-98.7 °F (37.1 °C)] 97.6 °F (36.4 °C)  Heart Rate:  [87-88] 88  Resp:  [18] 18  BP: (116-136)/(66-87) 136/87    Discharge Disposition  Home or Self Care    Discharge Medications     Discharge Medications      Continue These Medications      Instructions Start Date   mirtazapine 15 MG tablet  Commonly known as:  REMERON   15 mg, Oral, Nightly      OLANZapine 5 MG tablet  Commonly known as:  zyPREXA   5 mg, Oral, Nightly             Discharge Diet: Regular    Activity at Discharge: No restrictions    Follow-up Appointments:    [place] [id number] [phone]     August 14 2020 at 10:15am with Krysta.           Carlton Lozada MD  08/05/20  08:51  Time spent with the discharge process >30 minutes.     Dictated utilizing Dragon dictation
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
## 2020-08-05 NOTE — PLAN OF CARE
Problem: Patient Care Overview  Goal: Plan of Care Review  Outcome: Ongoing (interventions implemented as appropriate)  Flowsheets  Taken 8/5/2020 0503  Progress: no change  Outcome Summary: Pt in room most of shift. Denies anxiety depression SI HI AVH.  Taken 8/4/2020 2310  Plan of Care Reviewed With: patient  Patient Agreement with Plan of Care: agrees

## 2020-10-06 ENCOUNTER — HOSPITAL ENCOUNTER (INPATIENT)
Facility: HOSPITAL | Age: 35
LOS: 2 days | Discharge: HOME | End: 2020-10-08
Attending: PSYCHIATRY & NEUROLOGY | Admitting: PSYCHIATRY & NEUROLOGY

## 2020-10-06 ENCOUNTER — HOSPITAL ENCOUNTER (EMERGENCY)
Facility: HOSPITAL | Age: 35
Discharge: PSYCHIATRIC OR SUBSTANCE USE FACILITY | End: 2020-10-06
Attending: EMERGENCY MEDICINE | Admitting: EMERGENCY MEDICINE

## 2020-10-06 VITALS
TEMPERATURE: 99.1 F | BODY MASS INDEX: 22.73 KG/M2 | WEIGHT: 150 LBS | OXYGEN SATURATION: 95 % | DIASTOLIC BLOOD PRESSURE: 78 MMHG | SYSTOLIC BLOOD PRESSURE: 119 MMHG | HEART RATE: 109 BPM | RESPIRATION RATE: 20 BRPM | HEIGHT: 68 IN

## 2020-10-06 DIAGNOSIS — F32.A DEPRESSION WITH SUICIDAL IDEATION: Primary | ICD-10-CM

## 2020-10-06 DIAGNOSIS — R45.851 DEPRESSION WITH SUICIDAL IDEATION: Primary | ICD-10-CM

## 2020-10-06 PROBLEM — F32.9 MDD (MAJOR DEPRESSIVE DISORDER): Status: ACTIVE | Noted: 2020-10-06

## 2020-10-06 LAB
6-ACETYL MORPHINE: NEGATIVE
ALBUMIN SERPL-MCNC: 4.57 G/DL (ref 3.5–5.2)
ALBUMIN/GLOB SERPL: 1.7 G/DL
ALP SERPL-CCNC: 73 U/L (ref 39–117)
ALT SERPL W P-5'-P-CCNC: 30 U/L (ref 1–41)
AMPHET+METHAMPHET UR QL: NEGATIVE
ANION GAP SERPL CALCULATED.3IONS-SCNC: 9.7 MMOL/L (ref 5–15)
AST SERPL-CCNC: 25 U/L (ref 1–40)
BARBITURATES UR QL SCN: NEGATIVE
BASOPHILS # BLD AUTO: 0.03 10*3/MM3 (ref 0–0.2)
BASOPHILS NFR BLD AUTO: 0.3 % (ref 0–1.5)
BENZODIAZ UR QL SCN: NEGATIVE
BILIRUB SERPL-MCNC: <0.2 MG/DL (ref 0–1.2)
BILIRUB UR QL STRIP: NEGATIVE
BUN SERPL-MCNC: 12 MG/DL (ref 6–20)
BUN/CREAT SERPL: 17.4 (ref 7–25)
BUPRENORPHINE SERPL-MCNC: NEGATIVE NG/ML
CALCIUM SPEC-SCNC: 9.6 MG/DL (ref 8.6–10.5)
CANNABINOIDS SERPL QL: NEGATIVE
CHLORIDE SERPL-SCNC: 104 MMOL/L (ref 98–107)
CLARITY UR: CLEAR
CO2 SERPL-SCNC: 25.3 MMOL/L (ref 22–29)
COCAINE UR QL: NEGATIVE
COLOR UR: YELLOW
CREAT SERPL-MCNC: 0.69 MG/DL (ref 0.76–1.27)
DEPRECATED RDW RBC AUTO: 42.5 FL (ref 37–54)
EOSINOPHIL # BLD AUTO: 0.14 10*3/MM3 (ref 0–0.4)
EOSINOPHIL NFR BLD AUTO: 1.4 % (ref 0.3–6.2)
ERYTHROCYTE [DISTWIDTH] IN BLOOD BY AUTOMATED COUNT: 12.8 % (ref 12.3–15.4)
ETHANOL BLD-MCNC: <10 MG/DL (ref 0–10)
ETHANOL UR QL: <0.01 %
GFR SERPL CREATININE-BSD FRML MDRD: 132 ML/MIN/1.73
GLOBULIN UR ELPH-MCNC: 2.7 GM/DL
GLUCOSE SERPL-MCNC: 85 MG/DL (ref 65–99)
GLUCOSE UR STRIP-MCNC: NEGATIVE MG/DL
HCT VFR BLD AUTO: 47.8 % (ref 37.5–51)
HGB BLD-MCNC: 15.7 G/DL (ref 13–17.7)
HGB UR QL STRIP.AUTO: NEGATIVE
IMM GRANULOCYTES # BLD AUTO: 0.03 10*3/MM3 (ref 0–0.05)
IMM GRANULOCYTES NFR BLD AUTO: 0.3 % (ref 0–0.5)
KETONES UR QL STRIP: NEGATIVE
LEUKOCYTE ESTERASE UR QL STRIP.AUTO: NEGATIVE
LYMPHOCYTES # BLD AUTO: 3.11 10*3/MM3 (ref 0.7–3.1)
LYMPHOCYTES NFR BLD AUTO: 30.3 % (ref 19.6–45.3)
MAGNESIUM SERPL-MCNC: 2 MG/DL (ref 1.6–2.6)
MCH RBC QN AUTO: 29.8 PG (ref 26.6–33)
MCHC RBC AUTO-ENTMCNC: 32.8 G/DL (ref 31.5–35.7)
MCV RBC AUTO: 90.7 FL (ref 79–97)
METHADONE UR QL SCN: NEGATIVE
MONOCYTES # BLD AUTO: 0.62 10*3/MM3 (ref 0.1–0.9)
MONOCYTES NFR BLD AUTO: 6 % (ref 5–12)
NEUTROPHILS NFR BLD AUTO: 6.35 10*3/MM3 (ref 1.7–7)
NEUTROPHILS NFR BLD AUTO: 61.7 % (ref 42.7–76)
NITRITE UR QL STRIP: NEGATIVE
NRBC BLD AUTO-RTO: 0 /100 WBC (ref 0–0.2)
OPIATES UR QL: NEGATIVE
OXYCODONE UR QL SCN: NEGATIVE
PCP UR QL SCN: NEGATIVE
PH UR STRIP.AUTO: 6.5 [PH] (ref 5–8)
PLATELET # BLD AUTO: 295 10*3/MM3 (ref 140–450)
PMV BLD AUTO: 10.4 FL (ref 6–12)
POTASSIUM SERPL-SCNC: 4.3 MMOL/L (ref 3.5–5.2)
PROT SERPL-MCNC: 7.3 G/DL (ref 6–8.5)
PROT UR QL STRIP: NEGATIVE
RBC # BLD AUTO: 5.27 10*6/MM3 (ref 4.14–5.8)
SARS-COV-2 RNA RESP QL NAA+PROBE: NOT DETECTED
SODIUM SERPL-SCNC: 139 MMOL/L (ref 136–145)
SP GR UR STRIP: <=1.005 (ref 1–1.03)
UROBILINOGEN UR QL STRIP: NORMAL
WBC # BLD AUTO: 10.28 10*3/MM3 (ref 3.4–10.8)

## 2020-10-06 PROCEDURE — 80307 DRUG TEST PRSMV CHEM ANLYZR: CPT | Performed by: PHYSICIAN ASSISTANT

## 2020-10-06 PROCEDURE — 87635 SARS-COV-2 COVID-19 AMP PRB: CPT | Performed by: PHYSICIAN ASSISTANT

## 2020-10-06 PROCEDURE — 83735 ASSAY OF MAGNESIUM: CPT | Performed by: PHYSICIAN ASSISTANT

## 2020-10-06 PROCEDURE — 81003 URINALYSIS AUTO W/O SCOPE: CPT | Performed by: PHYSICIAN ASSISTANT

## 2020-10-06 PROCEDURE — 93005 ELECTROCARDIOGRAM TRACING: CPT | Performed by: PSYCHIATRY & NEUROLOGY

## 2020-10-06 PROCEDURE — 99285 EMERGENCY DEPT VISIT HI MDM: CPT

## 2020-10-06 PROCEDURE — 80053 COMPREHEN METABOLIC PANEL: CPT | Performed by: PHYSICIAN ASSISTANT

## 2020-10-06 PROCEDURE — 93010 ELECTROCARDIOGRAM REPORT: CPT | Performed by: INTERNAL MEDICINE

## 2020-10-06 PROCEDURE — C9803 HOPD COVID-19 SPEC COLLECT: HCPCS

## 2020-10-06 PROCEDURE — 85025 COMPLETE CBC W/AUTO DIFF WBC: CPT | Performed by: PHYSICIAN ASSISTANT

## 2020-10-06 RX ORDER — IBUPROFEN 400 MG/1
400 TABLET ORAL EVERY 6 HOURS PRN
Status: DISCONTINUED | OUTPATIENT
Start: 2020-10-06 | End: 2020-10-08 | Stop reason: HOSPADM

## 2020-10-06 RX ORDER — TRAZODONE HYDROCHLORIDE 50 MG/1
50 TABLET ORAL NIGHTLY PRN
Status: DISCONTINUED | OUTPATIENT
Start: 2020-10-06 | End: 2020-10-07

## 2020-10-06 RX ORDER — ACETAMINOPHEN 325 MG/1
650 TABLET ORAL EVERY 6 HOURS PRN
Status: DISCONTINUED | OUTPATIENT
Start: 2020-10-06 | End: 2020-10-08 | Stop reason: HOSPADM

## 2020-10-06 RX ORDER — BUPROPION HYDROCHLORIDE 300 MG/1
300 TABLET ORAL DAILY
COMMUNITY
End: 2020-10-08 | Stop reason: HOSPADM

## 2020-10-06 RX ORDER — FAMOTIDINE 20 MG/1
20 TABLET, FILM COATED ORAL 2 TIMES DAILY PRN
Status: DISCONTINUED | OUTPATIENT
Start: 2020-10-06 | End: 2020-10-08 | Stop reason: HOSPADM

## 2020-10-06 RX ORDER — ECHINACEA PURPUREA EXTRACT 125 MG
2 TABLET ORAL AS NEEDED
Status: DISCONTINUED | OUTPATIENT
Start: 2020-10-06 | End: 2020-10-08 | Stop reason: HOSPADM

## 2020-10-06 RX ORDER — ALUMINA, MAGNESIA, AND SIMETHICONE 2400; 2400; 240 MG/30ML; MG/30ML; MG/30ML
15 SUSPENSION ORAL EVERY 6 HOURS PRN
Status: DISCONTINUED | OUTPATIENT
Start: 2020-10-06 | End: 2020-10-08 | Stop reason: HOSPADM

## 2020-10-06 RX ORDER — LOPERAMIDE HYDROCHLORIDE 2 MG/1
2 CAPSULE ORAL
Status: DISCONTINUED | OUTPATIENT
Start: 2020-10-06 | End: 2020-10-08 | Stop reason: HOSPADM

## 2020-10-06 RX ORDER — ONDANSETRON 4 MG/1
4 TABLET, FILM COATED ORAL EVERY 6 HOURS PRN
Status: DISCONTINUED | OUTPATIENT
Start: 2020-10-06 | End: 2020-10-08 | Stop reason: HOSPADM

## 2020-10-06 RX ORDER — BENZTROPINE MESYLATE 1 MG/ML
1 INJECTION INTRAMUSCULAR; INTRAVENOUS ONCE AS NEEDED
Status: DISCONTINUED | OUTPATIENT
Start: 2020-10-06 | End: 2020-10-08 | Stop reason: HOSPADM

## 2020-10-06 RX ORDER — HYDROXYZINE 50 MG/1
50 TABLET, FILM COATED ORAL EVERY 6 HOURS PRN
Status: DISCONTINUED | OUTPATIENT
Start: 2020-10-06 | End: 2020-10-08 | Stop reason: HOSPADM

## 2020-10-06 RX ORDER — BENZTROPINE MESYLATE 1 MG/1
2 TABLET ORAL ONCE AS NEEDED
Status: DISCONTINUED | OUTPATIENT
Start: 2020-10-06 | End: 2020-10-08 | Stop reason: HOSPADM

## 2020-10-06 RX ORDER — BENZONATATE 100 MG/1
100 CAPSULE ORAL 3 TIMES DAILY PRN
Status: DISCONTINUED | OUTPATIENT
Start: 2020-10-06 | End: 2020-10-08 | Stop reason: HOSPADM
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
## 2020-10-06 NOTE — NURSING NOTE
"Presented to ED reports SI with no plan.  Reports that he has been living with family, but \"it ain't working out.\"  States that he needs to get back on his medication, and get to a shelter.  Reports that he is hallucinating, but is not able to give any details.  States, \"If I get back on my wellbutrin, I won't hallucinate.\"  Reports that he was supposed to follow up with [facility], but did not do so.  Last admission here was in August 2020.  Is noted to be talking to himself during admission assessment.  Speech is mumbling and slurred.  He is a poor historian, and is unable to give details of substance use, but does report recent meth use.   "

## 2020-10-06 NOTE — NURSING NOTE
PT presented to intake with thoughts of SI. Pt stated that his family has kicked him out and he is homeless. PT stated that he was prescribed Wellbutrin in the past but did not take like he was suppose to and would like to get back on it and would like help with finding somewhere to live. Pt stated that he had been using meth in the past few days. Pt stated he was having suicidal thoughts but did not have a specific plan. Pt stated he was just stressed and had no where to go at this time.

## 2020-10-07 PROCEDURE — 99223 1ST HOSP IP/OBS HIGH 75: CPT | Performed by: PSYCHIATRY & NEUROLOGY

## 2020-10-07 RX ORDER — OLANZAPINE 5 MG/1
5 TABLET ORAL NIGHTLY
Status: DISCONTINUED | OUTPATIENT
Start: 2020-10-07 | End: 2020-10-08 | Stop reason: HOSPADM

## 2020-10-07 RX ORDER — BUPROPION HYDROCHLORIDE 150 MG/1
300 TABLET ORAL DAILY
Status: DISCONTINUED | OUTPATIENT
Start: 2020-10-07 | End: 2020-10-07

## 2020-10-07 RX ORDER — MIRTAZAPINE 15 MG/1
15 TABLET, FILM COATED ORAL NIGHTLY
Status: DISCONTINUED | OUTPATIENT
Start: 2020-10-07 | End: 2020-10-08 | Stop reason: HOSPADM

## 2020-10-07 RX ADMIN — BUPROPION HYDROCHLORIDE 300 MG: 150 TABLET, EXTENDED RELEASE ORAL at 13:27

## 2020-10-07 RX ADMIN — IBUPROFEN 400 MG: 400 TABLET, FILM COATED ORAL at 08:06

## 2020-10-07 RX ADMIN — MIRTAZAPINE 15 MG: 15 TABLET, FILM COATED ORAL at 20:31

## 2020-10-07 RX ADMIN — HYDROXYZINE HYDROCHLORIDE 50 MG: 50 TABLET ORAL at 08:06

## 2020-10-07 RX ADMIN — OLANZAPINE 5 MG: 5 TABLET ORAL at 20:31
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
## 2020-10-07 NOTE — PLAN OF CARE
"Goal Outcome Evaluation:  Plan of Care Reviewed With: patient  Progress: improving  Outcome Summary: Patient denies SI/HI at this time. States that he is sleeping better. Ratest anxiety 5/10 and depresion 5/10. Reported he \"wants his medication so he will feel better\"  "

## 2020-10-07 NOTE — PLAN OF CARE
Goal Outcome Evaluation:  Plan of Care Reviewed With: patient  Progress: improving    Pt in room and avoids social contact. Reports anxiety 10/10, depression 10/10, denies SI/HI. Witnessed pt responding to internal stimuli throughout evening. Appetite and sleep are good.

## 2020-10-07 NOTE — PLAN OF CARE
Problem: Adult Behavioral Health Plan of Care  Goal: Plan of Care Review  Outcome: Ongoing, Progressing  Flowsheets (Taken 10/7/2020 1135)  Consent Given to Review Plan with: Lee AUGUSTIN  Progress: no change  Plan of Care Reviewed With: patient  Patient Agreement with Plan of Care: agrees  Outcome Summary: Met in the office. Updated social history. Reviewed treatment plans. Patient agreeable.       [id number]  D: Patient is a 35-year-old  male currently residing in [place], [place] where he has been living with his father, his brother, and his father's girlfriend. He has a 10th grade education. He is currently unemployed. He receives disability income. He was admitted after reporting suicidal thoughts and hallucinations. He reported his plan was to follow up with Lee AUGUSTIN. He declined residential referral, preferring to go to a homeless shelter. Therapist explained these resources were not operating during the pandemic, and the patient maintained that he wanted to find his own place rather than go home or to residential treatment. Patient has a history of numerous previous admissions, most recently in August 2020.    A: Patient's affect appeared restricted. His speech was slurred, to the point he was difficult to understand at times. He seemed fatigued and restless. He was, however, polite and cooperative. It appeared he also had some unrealistic expectations for treatment.     P: Patient has been placed on special precautions level three. He will be monitored routinely for his safety. He will be provided with individual and group therapy. He will follow up with Howard JEF.

## 2020-10-08 VITALS
SYSTOLIC BLOOD PRESSURE: 143 MMHG | HEIGHT: 68 IN | TEMPERATURE: 97.2 F | DIASTOLIC BLOOD PRESSURE: 93 MMHG | WEIGHT: 157 LBS | HEART RATE: 105 BPM | BODY MASS INDEX: 23.79 KG/M2 | RESPIRATION RATE: 18 BRPM | OXYGEN SATURATION: 98 %

## 2020-10-08 PROCEDURE — 99239 HOSP IP/OBS DSCHRG MGMT >30: CPT | Performed by: PSYCHIATRY & NEUROLOGY

## 2020-10-08 RX ORDER — OLANZAPINE 5 MG/1
5 TABLET ORAL NIGHTLY
Qty: 30 TABLET | Refills: 0 | OUTPATIENT
Start: 2020-10-08 | End: 2021-03-14

## 2020-10-08 RX ORDER — MIRTAZAPINE 15 MG/1
15 TABLET, FILM COATED ORAL NIGHTLY
Qty: 30 TABLET | Refills: 0 | OUTPATIENT
Start: 2020-10-08 | End: 2021-03-14

## 2020-10-08 NOTE — DISCHARGE INSTR - APPOINTMENTS
[facility] ([facility])  [address] [place] [address]  [phone]  Thursday, October 15th at 10:00 AM  [facility] will pick you up at 9:30.   Return trip scheduled at 11:00

## 2020-10-08 NOTE — PLAN OF CARE
Problem: Adult Behavioral Health Plan of Care  Goal: Plan of Care Review  Outcome: Ongoing, Progressing  Flowsheets  Taken 10/8/2020 0335  Progress: improving  Plan of Care Reviewed With: patient  Patient Agreement with Plan of Care: agrees  Taken 10/7/2020 2129  Plan of Care Reviewed With: patient  Patient Agreement with Plan of Care: agrees  Goal: Patient-Specific Goal (Individualization)  Outcome: Ongoing, Progressing  Goal: Adheres to Safety Considerations for Self and Others  Outcome: Ongoing, Progressing  Goal: Absence of New-Onset Illness or Injury  Outcome: Ongoing, Progressing  Intervention: Prevent VTE (venous thromboembolism)  Recent Flowsheet Documentation  Taken 10/7/2020 2129 by Mary Purdy RN  VTE Prevention/Management: right  Goal: Optimized Coping Skills in Response to Life Stressors  Outcome: Ongoing, Progressing  Goal: Develops/Participates in Therapeutic [place] to Support Successful Transition  Outcome: Ongoing, Progressing     Problem: Suicidal Behavior  Goal: Suicidal Behavior is Absent or Managed  Outcome: Ongoing, Progressing     Problem: Sensory Perception Impairment (Psychotic Signs/Symptoms)  Goal: Decreased Sensory Symptoms (Psychotic Signs/Symptoms)  Outcome: Ongoing, Progressing     Problem: Behavior Regulation Impairment (Impaired Interpersonal Functioning)  Goal: Improved Self-Control (Impaired Interpersonal Functioning)  Outcome: Ongoing, Progressing     Problem: Cognitive Impairment (Impaired Interpersonal Functioning)  Goal: Optimized Cognitive Function (Impaired Interpersonal Functioning)  Outcome: Ongoing, Progressing   Goal Outcome Evaluation:  Plan of Care Reviewed With: patient  Progress: improving       Alert and oriented; reports good appetite and sleep; rates anxiety 7; depression 8; denies SI/HI/AVH; restless, isolating self in his room most of shift in bed; will continue to monitor.

## 2020-10-08 NOTE — PLAN OF CARE
Goal Outcome Evaluation:  Plan of Care Reviewed With: patient  Progress: improving  Outcome Summary: Patient is discharging this shift.

## 2020-10-08 NOTE — DISCHARGE SUMMARY
Date of Discharge:  10/8/2020    Discharge Diagnosis:Principal Problem:    Other recurrent depressive disorders (CMS/HCC)  Active Problems:    Methamphetamine abuse (CMS/HCC)    Borderline intellectual disability    Tetrahydrocannabinol (THC) use disorder, mild, abuse        Presenting Problem/History of Present Illness:Patient was admitted to the hospital on October 6 having presented agitated, depressed and voicing suicidal ideation, voluntarily admitted for safety evaluation and treatment, see admission note for details.         Hospital Course:  Patient was admitted for safety and stabilization and was placed on standard precautions.  Routine labs were checked.  Patient was assigned a masters level therapist and provided with an opportunity to participate in group and individual therapy on the unit.  Patient seen on a daily basis for evaluation and supportive therapy.  Patient admitted placed on olanzapine and mirtazapine once again with a rapid clearing of his depression as well as agitation.  On the morning of October 8 patient requesting discharge stating that he was going to return home denying any thoughts of harming self or others  and without any apparent psychotic thought content.  Patient declining recommendations for referral to residential treatment for his continuing sporadic abuse of substances such as methamphetamine.  6 see below for medications.    Consults:   Consults     No orders found from 9/7/2020 to 10/7/2020.          Labs:  Lab Results (all)     None          Imaging:  Imaging Results (All)     None          Condition on Discharge:  improved    Prognosis: Guarded    Vital Signs  Temp:  [97.2 °F (36.2 °C)-97.9 °F (36.6 °C)] 97.2 °F (36.2 °C)  Heart Rate:  [] 105  Resp:  [18] 18  BP: (123-143)/(78-93) 143/93    Discharge Disposition  Home or Self Care    Discharge Medications     Discharge Medications      New Medications      Instructions Start Date   mirtazapine 15 MG  tablet  Commonly known as: REMERON   15 mg, Oral, Nightly      OLANZapine 5 MG tablet  Commonly known as: zyPREXA   5 mg, Oral, Nightly         Stop These Medications    buPROPion  MG 24 hr tablet  Commonly known as: WELLBUTRIN XL            Discharge Diet: Regular    Activity at Discharge: No restrictions    Follow-up Appointments: Patient to have appointment with the [facility] clinic for follow-up          Carlton Lozada MD  10/08/20  08:43 EDT  Time spent with the discharge process >30 minutes.     Dictated utilizing Dragon dictation

## 2020-10-08 NOTE — DISCHARGE INSTR - ACTIVITY
Addended by: CHRISTOPHER FARIA on: 11/1/2019 10:04 AM     Modules accepted: Level of Service     Resume your regular activities as tolerated.

## 2020-11-08 ENCOUNTER — APPOINTMENT (OUTPATIENT)
Dept: GENERAL RADIOLOGY | Facility: HOSPITAL | Age: 35
End: 2020-11-08

## 2020-11-08 ENCOUNTER — HOSPITAL ENCOUNTER (EMERGENCY)
Facility: HOSPITAL | Age: 35
Discharge: HOME | End: 2020-11-08
Attending: FAMILY MEDICINE | Admitting: FAMILY MEDICINE

## 2020-11-08 VITALS
BODY MASS INDEX: 21.47 KG/M2 | DIASTOLIC BLOOD PRESSURE: 78 MMHG | WEIGHT: 150 LBS | RESPIRATION RATE: 18 BRPM | HEIGHT: 70 IN | HEART RATE: 88 BPM | TEMPERATURE: 98.7 F | SYSTOLIC BLOOD PRESSURE: 142 MMHG | OXYGEN SATURATION: 98 %

## 2020-11-08 DIAGNOSIS — J06.9 ACUTE URI: Primary | ICD-10-CM

## 2020-11-08 LAB
A-A DO2: 18.1 MMHG (ref 0–300)
ALBUMIN SERPL-MCNC: 4.48 G/DL (ref 3.5–5.2)
ALBUMIN/GLOB SERPL: 1.4 G/DL
ALP SERPL-CCNC: 83 U/L (ref 39–117)
ALT SERPL W P-5'-P-CCNC: 29 U/L (ref 1–41)
ANION GAP SERPL CALCULATED.3IONS-SCNC: 15.8 MMOL/L (ref 5–15)
APTT PPP: 29 SECONDS (ref 25.6–35.3)
ARTERIAL PATENCY WRIST A: ABNORMAL
AST SERPL-CCNC: 23 U/L (ref 1–40)
ATMOSPHERIC PRESS: 734 MMHG
BASE EXCESS BLDA CALC-SCNC: 1.4 MMOL/L (ref 0–2)
BASOPHILS # BLD AUTO: 0.04 10*3/MM3 (ref 0–0.2)
BASOPHILS NFR BLD AUTO: 0.3 % (ref 0–1.5)
BDY SITE: ABNORMAL
BILIRUB SERPL-MCNC: 0.5 MG/DL (ref 0–1.2)
BODY TEMPERATURE: 0 C
BUN SERPL-MCNC: 14 MG/DL (ref 6–20)
BUN/CREAT SERPL: 18.2 (ref 7–25)
CALCIUM SPEC-SCNC: 9.6 MG/DL (ref 8.6–10.5)
CHLORIDE SERPL-SCNC: 99 MMOL/L (ref 98–107)
CO2 BLDA-SCNC: 27.5 MMOL/L (ref 22–33)
CO2 SERPL-SCNC: 23.2 MMOL/L (ref 22–29)
COHGB MFR BLD: 8.3 % (ref 0–5)
CREAT SERPL-MCNC: 0.77 MG/DL (ref 0.76–1.27)
CRP SERPL-MCNC: 0.71 MG/DL (ref 0–0.5)
D DIMER PPP FEU-MCNC: 0.4 MCGFEU/ML (ref 0–0.5)
D-LACTATE SERPL-SCNC: 2.5 MMOL/L (ref 0.5–2)
DEPRECATED RDW RBC AUTO: 44.1 FL (ref 37–54)
EOSINOPHIL # BLD AUTO: 0.08 10*3/MM3 (ref 0–0.4)
EOSINOPHIL NFR BLD AUTO: 0.7 % (ref 0.3–6.2)
ERYTHROCYTE [DISTWIDTH] IN BLOOD BY AUTOMATED COUNT: 12.9 % (ref 12.3–15.4)
FERRITIN SERPL-MCNC: 125.1 NG/ML (ref 30–400)
FLUAV AG NPH QL: NEGATIVE
FLUBV AG NPH QL IA: NEGATIVE
GFR SERPL CREATININE-BSD FRML MDRD: 116 ML/MIN/1.73
GLOBULIN UR ELPH-MCNC: 3.2 GM/DL
GLUCOSE SERPL-MCNC: 103 MG/DL (ref 65–99)
HCO3 BLDA-SCNC: 26.2 MMOL/L (ref 20–26)
HCT VFR BLD AUTO: 46.2 % (ref 37.5–51)
HCT VFR BLD CALC: 44.7 % (ref 38–51)
HGB BLD-MCNC: 14.9 G/DL (ref 13–17.7)
HGB BLDA-MCNC: 14.6 G/DL (ref 14–18)
IMM GRANULOCYTES # BLD AUTO: 0.03 10*3/MM3 (ref 0–0.05)
IMM GRANULOCYTES NFR BLD AUTO: 0.3 % (ref 0–0.5)
INHALED O2 CONCENTRATION: 21 %
INR PPP: 1.02 (ref 0.9–1.1)
LACTATE HOLD SPECIMEN: NORMAL
LDH SERPL-CCNC: 176 U/L (ref 135–225)
LYMPHOCYTES # BLD AUTO: 2.39 10*3/MM3 (ref 0.7–3.1)
LYMPHOCYTES NFR BLD AUTO: 20.2 % (ref 19.6–45.3)
Lab: ABNORMAL
MAGNESIUM SERPL-MCNC: 1.8 MG/DL (ref 1.6–2.6)
MCH RBC QN AUTO: 29.7 PG (ref 26.6–33)
MCHC RBC AUTO-ENTMCNC: 32.3 G/DL (ref 31.5–35.7)
MCV RBC AUTO: 92 FL (ref 79–97)
METHGB BLD QL: 0.3 % (ref 0–3)
MODALITY: ABNORMAL
MONOCYTES # BLD AUTO: 0.78 10*3/MM3 (ref 0.1–0.9)
MONOCYTES NFR BLD AUTO: 6.6 % (ref 5–12)
NEUTROPHILS NFR BLD AUTO: 71.9 % (ref 42.7–76)
NEUTROPHILS NFR BLD AUTO: 8.49 10*3/MM3 (ref 1.7–7)
NOTE: ABNORMAL
NRBC BLD AUTO-RTO: 0 /100 WBC (ref 0–0.2)
NT-PROBNP SERPL-MCNC: 31.9 PG/ML (ref 0–450)
OXYHGB MFR BLDV: 88.7 % (ref 94–99)
PCO2 BLDA: 41.4 MM HG (ref 35–45)
PCO2 TEMP ADJ BLD: ABNORMAL MM[HG]
PH BLDA: 7.41 PH UNITS (ref 7.35–7.45)
PH, TEMP CORRECTED: ABNORMAL
PLATELET # BLD AUTO: 286 10*3/MM3 (ref 140–450)
PMV BLD AUTO: 10.2 FL (ref 6–12)
PO2 BLDA: 79.5 MM HG (ref 83–108)
PO2 TEMP ADJ BLD: ABNORMAL MM[HG]
POTASSIUM SERPL-SCNC: 3.6 MMOL/L (ref 3.5–5.2)
PROCALCITONIN SERPL-MCNC: 0.07 NG/ML (ref 0–0.25)
PROT SERPL-MCNC: 7.7 G/DL (ref 6–8.5)
PROTHROMBIN TIME: 13.2 SECONDS (ref 11.9–14.1)
QT INTERVAL: 328 MS
QTC INTERVAL: 443 MS
RBC # BLD AUTO: 5.02 10*6/MM3 (ref 4.14–5.8)
S PYO AG THROAT QL: NEGATIVE
SAO2 % BLDCOA: 97 % (ref 94–99)
SARS-COV-2 RNA RESP QL NAA+PROBE: NOT DETECTED
SODIUM SERPL-SCNC: 138 MMOL/L (ref 136–145)
TROPONIN T SERPL-MCNC: <0.01 NG/ML (ref 0–0.03)
VENTILATOR MODE: ABNORMAL
WBC # BLD AUTO: 11.81 10*3/MM3 (ref 3.4–10.8)

## 2020-11-08 PROCEDURE — 87804 INFLUENZA ASSAY W/OPTIC: CPT | Performed by: PHYSICIAN ASSISTANT

## 2020-11-08 PROCEDURE — 86140 C-REACTIVE PROTEIN: CPT | Performed by: PHYSICIAN ASSISTANT

## 2020-11-08 PROCEDURE — 73130 X-RAY EXAM OF HAND: CPT | Performed by: RADIOLOGY

## 2020-11-08 PROCEDURE — 84484 ASSAY OF TROPONIN QUANT: CPT | Performed by: PHYSICIAN ASSISTANT

## 2020-11-08 PROCEDURE — 82805 BLOOD GASES W/O2 SATURATION: CPT

## 2020-11-08 PROCEDURE — 85610 PROTHROMBIN TIME: CPT | Performed by: PHYSICIAN ASSISTANT

## 2020-11-08 PROCEDURE — 71045 X-RAY EXAM CHEST 1 VIEW: CPT | Performed by: RADIOLOGY

## 2020-11-08 PROCEDURE — 85730 THROMBOPLASTIN TIME PARTIAL: CPT | Performed by: PHYSICIAN ASSISTANT

## 2020-11-08 PROCEDURE — 83880 ASSAY OF NATRIURETIC PEPTIDE: CPT | Performed by: PHYSICIAN ASSISTANT

## 2020-11-08 PROCEDURE — 84145 PROCALCITONIN (PCT): CPT | Performed by: PHYSICIAN ASSISTANT

## 2020-11-08 PROCEDURE — 99285 EMERGENCY DEPT VISIT HI MDM: CPT

## 2020-11-08 PROCEDURE — 83615 LACTATE (LD) (LDH) ENZYME: CPT | Performed by: PHYSICIAN ASSISTANT

## 2020-11-08 PROCEDURE — 87880 STREP A ASSAY W/OPTIC: CPT | Performed by: PHYSICIAN ASSISTANT

## 2020-11-08 PROCEDURE — 85025 COMPLETE CBC W/AUTO DIFF WBC: CPT | Performed by: PHYSICIAN ASSISTANT

## 2020-11-08 PROCEDURE — 36600 WITHDRAWAL OF ARTERIAL BLOOD: CPT

## 2020-11-08 PROCEDURE — 80053 COMPREHEN METABOLIC PANEL: CPT | Performed by: PHYSICIAN ASSISTANT

## 2020-11-08 PROCEDURE — 93005 ELECTROCARDIOGRAM TRACING: CPT | Performed by: FAMILY MEDICINE

## 2020-11-08 PROCEDURE — 71045 X-RAY EXAM CHEST 1 VIEW: CPT

## 2020-11-08 PROCEDURE — 82728 ASSAY OF FERRITIN: CPT | Performed by: PHYSICIAN ASSISTANT

## 2020-11-08 PROCEDURE — 87081 CULTURE SCREEN ONLY: CPT | Performed by: PHYSICIAN ASSISTANT

## 2020-11-08 PROCEDURE — 87635 SARS-COV-2 COVID-19 AMP PRB: CPT | Performed by: PHYSICIAN ASSISTANT

## 2020-11-08 PROCEDURE — 85379 FIBRIN DEGRADATION QUANT: CPT | Performed by: PHYSICIAN ASSISTANT

## 2020-11-08 PROCEDURE — 82375 ASSAY CARBOXYHB QUANT: CPT

## 2020-11-08 PROCEDURE — 83735 ASSAY OF MAGNESIUM: CPT | Performed by: PHYSICIAN ASSISTANT

## 2020-11-08 PROCEDURE — 83605 ASSAY OF LACTIC ACID: CPT | Performed by: PHYSICIAN ASSISTANT

## 2020-11-08 PROCEDURE — 93010 ELECTROCARDIOGRAM REPORT: CPT | Performed by: INTERNAL MEDICINE

## 2020-11-08 PROCEDURE — 83050 HGB METHEMOGLOBIN QUAN: CPT

## 2020-11-08 PROCEDURE — 73130 X-RAY EXAM OF HAND: CPT

## 2020-11-08 PROCEDURE — 87040 BLOOD CULTURE FOR BACTERIA: CPT | Performed by: PHYSICIAN ASSISTANT

## 2020-11-08 RX ORDER — SODIUM CHLORIDE 0.9 % (FLUSH) 0.9 %
10 SYRINGE (ML) INJECTION AS NEEDED
Status: DISCONTINUED | OUTPATIENT
Start: 2020-11-08 | End: 2020-11-08 | Stop reason: HOSPADM

## 2020-11-08 RX ORDER — AZITHROMYCIN 250 MG/1
TABLET, FILM COATED ORAL
Qty: 6 TABLET | Refills: 0 | OUTPATIENT
Start: 2020-11-08 | End: 2021-03-14

## 2020-11-08 RX ADMIN — SODIUM CHLORIDE 1000 ML: 9 INJECTION, SOLUTION INTRAVENOUS at 15:47

## 2020-11-08 NOTE — DISCHARGE INSTRUCTIONS
Rest, drink plenty of fluids.  Return to the ED if her symptoms change or worsen.    Call one of the offices below to establish a primary care provider.  If you are unable to get an appointment and feel it is an emergency and need to be seen immediately please return to the Emergency Department.    Call one of the office below to set up a primary care provider.    Dr. Steve Hernandez                                                                                                       [address] [facility] [address]  [phone]    Dr. Albert, Dr. JOHN Kamara, Dr. SALLY Kamara ([facility])  [address] [facility] [address]  [phone]    Dr. Andrade, Dr. Campbell, Dr. Mejias ([facility])  [address] [facility] [address]  [phone]    Dr. Perea  [address] [facility] [address]  [phone]    Dr. June, Dr. Colunga, Dr. Salinas, Dr. Ravi ([facility])  [address] [place] [address] [address]  [facility] [address]  [phone]    Dr. Veronica Bryant  [address] [facility]  [place] [address]  [phone]    Dr. Vicki Pyle  [address]   [address]  [facility] [address]  [phone]    Dr. Hernandez  [address] [facility] [address]  [phone]    Dr. Foster, Dr. Greer  [address] [facility]  [place] [address]  [phone]    Dr. Guzmán  [address] [facility]                                                              [address]  [facility] [address]  [phone]    Dr. Sainz  [address] [facility] [address]  [phone]    Dr. Martine Woods  [address] [facility]  [address]  [place] [address]  [phone]    Dr. Rosario and [facility]   [address] [facility]  [address]  [place] [address]  [phone]

## 2020-11-08 NOTE — ED NOTES
Began pt triage and pt states that he will be right back that he must use the restroom. Pt left triage before a urine specimen cup could be given, will finish Pt triage upon Pt return.      Keila Mims RN  11/08/20 7201
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
## 2020-11-08 NOTE — ED NOTES
"Attempted to obtain EKG on Pt. Pt states \"I have to go to the bathroom, I will be back.\" Explained to Pt the importance of EKG related to symptoms, Pt verbalized understanding, continues to state that he is going to the restroom and will notify staff when he has returned. WCTM and evaluate.      Keila Mims, RN  11/08/20 3574    "

## 2020-11-08 NOTE — ED NOTES
Bladder scanned patient 2 times. Both times showed 0 ml. Provider is aware.      Emilia Camejo  11/08/20 7546

## 2020-11-08 NOTE — ED NOTES
Patient attempted urine sample, but was unsuccessful. IV fluids started. Ice water provided per provider approval.     Zaria Bethea RN  11/08/20 6457

## 2020-11-10 LAB — BACTERIA SPEC AEROBE CULT: NORMAL

## 2020-11-13 LAB
BACTERIA SPEC AEROBE CULT: NORMAL
BACTERIA SPEC AEROBE CULT: NORMAL
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
## 2021-03-14 ENCOUNTER — HOSPITAL ENCOUNTER (EMERGENCY)
Facility: HOSPITAL | Age: 36
Discharge: PSYCHIATRIC OR SUBSTANCE USE FACILITY | End: 2021-03-14
Attending: EMERGENCY MEDICINE | Admitting: EMERGENCY MEDICINE

## 2021-03-14 VITALS
HEART RATE: 115 BPM | WEIGHT: 150 LBS | OXYGEN SATURATION: 98 % | RESPIRATION RATE: 19 BRPM | DIASTOLIC BLOOD PRESSURE: 83 MMHG | HEIGHT: 68 IN | BODY MASS INDEX: 22.73 KG/M2 | SYSTOLIC BLOOD PRESSURE: 126 MMHG | TEMPERATURE: 98.6 F

## 2021-03-14 DIAGNOSIS — F12.10 MARIJUANA ABUSE: ICD-10-CM

## 2021-03-14 DIAGNOSIS — F33.1 MODERATE EPISODE OF RECURRENT MAJOR DEPRESSIVE DISORDER (HCC): ICD-10-CM

## 2021-03-14 DIAGNOSIS — F15.10 METHAMPHETAMINE ABUSE (HCC): Primary | ICD-10-CM

## 2021-03-14 LAB
6-ACETYL MORPHINE: NEGATIVE
ALBUMIN SERPL-MCNC: 4.32 G/DL (ref 3.5–5.2)
ALBUMIN/GLOB SERPL: 1.4 G/DL
ALP SERPL-CCNC: 72 U/L (ref 39–117)
ALT SERPL W P-5'-P-CCNC: 38 U/L (ref 1–41)
AMPHET+METHAMPHET UR QL: POSITIVE
ANION GAP SERPL CALCULATED.3IONS-SCNC: 12.1 MMOL/L (ref 5–15)
AST SERPL-CCNC: 55 U/L (ref 1–40)
BARBITURATES UR QL SCN: NEGATIVE
BASOPHILS # BLD AUTO: 0.05 10*3/MM3 (ref 0–0.2)
BASOPHILS NFR BLD AUTO: 0.5 % (ref 0–1.5)
BENZODIAZ UR QL SCN: NEGATIVE
BILIRUB SERPL-MCNC: 0.3 MG/DL (ref 0–1.2)
BILIRUB UR QL STRIP: NEGATIVE
BUN SERPL-MCNC: 17 MG/DL (ref 6–20)
BUN/CREAT SERPL: 25 (ref 7–25)
BUPRENORPHINE SERPL-MCNC: NEGATIVE NG/ML
CALCIUM SPEC-SCNC: 9.4 MG/DL (ref 8.6–10.5)
CANNABINOIDS SERPL QL: POSITIVE
CHLORIDE SERPL-SCNC: 102 MMOL/L (ref 98–107)
CLARITY UR: CLEAR
CO2 SERPL-SCNC: 25.9 MMOL/L (ref 22–29)
COCAINE UR QL: NEGATIVE
COLOR UR: ABNORMAL
CREAT SERPL-MCNC: 0.68 MG/DL (ref 0.76–1.27)
DEPRECATED RDW RBC AUTO: 44.2 FL (ref 37–54)
EOSINOPHIL # BLD AUTO: 0.09 10*3/MM3 (ref 0–0.4)
EOSINOPHIL NFR BLD AUTO: 0.9 % (ref 0.3–6.2)
ERYTHROCYTE [DISTWIDTH] IN BLOOD BY AUTOMATED COUNT: 12.8 % (ref 12.3–15.4)
ETHANOL BLD-MCNC: <10 MG/DL (ref 0–10)
ETHANOL UR QL: <0.01 %
FLUAV RNA RESP QL NAA+PROBE: NOT DETECTED
FLUBV RNA RESP QL NAA+PROBE: NOT DETECTED
GFR SERPL CREATININE-BSD FRML MDRD: 133 ML/MIN/1.73
GLOBULIN UR ELPH-MCNC: 3.2 GM/DL
GLUCOSE SERPL-MCNC: 127 MG/DL (ref 65–99)
GLUCOSE UR STRIP-MCNC: NEGATIVE MG/DL
HCT VFR BLD AUTO: 46.3 % (ref 37.5–51)
HGB BLD-MCNC: 15.2 G/DL (ref 13–17.7)
HGB UR QL STRIP.AUTO: NEGATIVE
IMM GRANULOCYTES # BLD AUTO: 0.03 10*3/MM3 (ref 0–0.05)
IMM GRANULOCYTES NFR BLD AUTO: 0.3 % (ref 0–0.5)
KETONES UR QL STRIP: ABNORMAL
LEUKOCYTE ESTERASE UR QL STRIP.AUTO: NEGATIVE
LYMPHOCYTES # BLD AUTO: 2.53 10*3/MM3 (ref 0.7–3.1)
LYMPHOCYTES NFR BLD AUTO: 24.3 % (ref 19.6–45.3)
MAGNESIUM SERPL-MCNC: 1.9 MG/DL (ref 1.6–2.6)
MCH RBC QN AUTO: 30.7 PG (ref 26.6–33)
MCHC RBC AUTO-ENTMCNC: 32.8 G/DL (ref 31.5–35.7)
MCV RBC AUTO: 93.5 FL (ref 79–97)
METHADONE UR QL SCN: NEGATIVE
MONOCYTES # BLD AUTO: 0.86 10*3/MM3 (ref 0.1–0.9)
MONOCYTES NFR BLD AUTO: 8.2 % (ref 5–12)
NEUTROPHILS NFR BLD AUTO: 6.87 10*3/MM3 (ref 1.7–7)
NEUTROPHILS NFR BLD AUTO: 65.8 % (ref 42.7–76)
NITRITE UR QL STRIP: NEGATIVE
NRBC BLD AUTO-RTO: 0 /100 WBC (ref 0–0.2)
OPIATES UR QL: NEGATIVE
OXYCODONE UR QL SCN: NEGATIVE
PCP UR QL SCN: NEGATIVE
PH UR STRIP.AUTO: 5.5 [PH] (ref 5–8)
PLATELET # BLD AUTO: 248 10*3/MM3 (ref 140–450)
PMV BLD AUTO: 10.9 FL (ref 6–12)
POTASSIUM SERPL-SCNC: 4 MMOL/L (ref 3.5–5.2)
PROT SERPL-MCNC: 7.5 G/DL (ref 6–8.5)
PROT UR QL STRIP: NEGATIVE
RBC # BLD AUTO: 4.95 10*6/MM3 (ref 4.14–5.8)
SARS-COV-2 RNA RESP QL NAA+PROBE: NOT DETECTED
SODIUM SERPL-SCNC: 140 MMOL/L (ref 136–145)
SP GR UR STRIP: 1.03 (ref 1–1.03)
UROBILINOGEN UR QL STRIP: ABNORMAL
WBC # BLD AUTO: 10.43 10*3/MM3 (ref 3.4–10.8)

## 2021-03-14 PROCEDURE — 80307 DRUG TEST PRSMV CHEM ANLYZR: CPT | Performed by: EMERGENCY MEDICINE

## 2021-03-14 PROCEDURE — 82077 ASSAY SPEC XCP UR&BREATH IA: CPT | Performed by: EMERGENCY MEDICINE

## 2021-03-14 PROCEDURE — 99284 EMERGENCY DEPT VISIT MOD MDM: CPT

## 2021-03-14 PROCEDURE — 80053 COMPREHEN METABOLIC PANEL: CPT | Performed by: EMERGENCY MEDICINE

## 2021-03-14 PROCEDURE — 85025 COMPLETE CBC W/AUTO DIFF WBC: CPT | Performed by: EMERGENCY MEDICINE

## 2021-03-14 PROCEDURE — 87636 SARSCOV2 & INF A&B AMP PRB: CPT | Performed by: EMERGENCY MEDICINE

## 2021-03-14 PROCEDURE — 81003 URINALYSIS AUTO W/O SCOPE: CPT | Performed by: EMERGENCY MEDICINE

## 2021-03-14 PROCEDURE — C9803 HOPD COVID-19 SPEC COLLECT: HCPCS

## 2021-03-14 PROCEDURE — 83735 ASSAY OF MAGNESIUM: CPT | Performed by: EMERGENCY MEDICINE

## 2021-03-14 RX ORDER — BUPROPION HYDROCHLORIDE 300 MG/1
300 TABLET ORAL DAILY
Qty: 7 TABLET | Refills: 0 | Status: SHIPPED | OUTPATIENT
Start: 2021-03-14 | End: 2021-03-14 | Stop reason: SDUPTHER

## 2021-03-14 RX ORDER — OLANZAPINE 5 MG/1
5 TABLET ORAL NIGHTLY
Qty: 7 TABLET | Refills: 0 | Status: ON HOLD | OUTPATIENT
Start: 2021-03-14 | End: 2021-11-06

## 2021-03-14 RX ORDER — OLANZAPINE 5 MG/1
5 TABLET ORAL NIGHTLY
Qty: 7 TABLET | Refills: 0 | Status: SHIPPED | OUTPATIENT
Start: 2021-03-14 | End: 2021-03-14 | Stop reason: SDUPTHER

## 2021-03-14 RX ORDER — BUPROPION HYDROCHLORIDE 300 MG/1
300 TABLET ORAL DAILY
Qty: 7 TABLET | Refills: 0 | Status: ON HOLD | OUTPATIENT
Start: 2021-03-14 | End: 2021-11-06

## 2021-03-14 NOTE — NURSING NOTE
Full intake assessment completed awaiting Lab results.Patient presented to ED with complaints of possible suicidal thoughts reporting if I do not get my medications I will become suicidal and homicidal. Patient rated anxiety and depression 5/10. Patient reported he has been incarcerated in the [facility] prison; since October of 2020. Patient reported he has been off his medications for 2 days and has been using opiates and Meth as well as THC. Patient stated he is not currently having suicidal or homicidal thoughts . Patient denies use ing alcohol,AVH.    17
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
## 2021-03-14 NOTE — DISCHARGE INSTRUCTIONS
Please be compliant with your medications. Please follow up with Dr. Talavera or return to ER if symptoms worsen.

## 2021-08-30 NOTE — DISCHARGE INSTR - APPOINTMENTS
[place] [id number]  [facility] [address]  [phone]    March 30 2017 at 9:15am with Wallace.    [Breast Self Exam] : breast self exam [Nutrition] : nutrition [Exercise] : exercise [Vitamins/Supplements] : vitamins/supplements [Contraception] : contraception

## 2021-11-06 ENCOUNTER — HOSPITAL ENCOUNTER (INPATIENT)
Facility: HOSPITAL | Age: 36
LOS: 3 days | Discharge: NURSING, REHAB OR LONG-TERM CARE | End: 2021-11-09
Attending: PSYCHIATRY & NEUROLOGY | Admitting: PSYCHIATRY & NEUROLOGY

## 2021-11-06 PROBLEM — R45.851 DEPRESSION WITH SUICIDAL IDEATION: Status: ACTIVE | Noted: 2021-11-06

## 2021-11-06 PROBLEM — F32.A DEPRESSION WITH SUICIDAL IDEATION: Status: ACTIVE | Noted: 2021-11-06

## 2021-11-06 LAB
FLUAV SUBTYP SPEC NAA+PROBE: NOT DETECTED
FLUBV RNA ISLT QL NAA+PROBE: NOT DETECTED
SARS-COV-2 RNA PNL SPEC NAA+PROBE: NOT DETECTED

## 2021-11-06 PROCEDURE — 99223 1ST HOSP IP/OBS HIGH 75: CPT | Performed by: PSYCHIATRY & NEUROLOGY

## 2021-11-06 PROCEDURE — 87636 SARSCOV2 & INF A&B AMP PRB: CPT | Performed by: PSYCHIATRY & NEUROLOGY

## 2021-11-06 RX ORDER — HYDROXYZINE 50 MG/1
50 TABLET, FILM COATED ORAL EVERY 6 HOURS PRN
Status: DISCONTINUED | OUTPATIENT
Start: 2021-11-06 | End: 2021-11-09 | Stop reason: HOSPADM

## 2021-11-06 RX ORDER — BENZONATATE 100 MG/1
100 CAPSULE ORAL 3 TIMES DAILY PRN
Status: DISCONTINUED | OUTPATIENT
Start: 2021-11-06 | End: 2021-11-09 | Stop reason: HOSPADM

## 2021-11-06 RX ORDER — NICOTINE 21 MG/24HR
1 PATCH, TRANSDERMAL 24 HOURS TRANSDERMAL
Status: DISCONTINUED | OUTPATIENT
Start: 2021-11-06 | End: 2021-11-09 | Stop reason: HOSPADM

## 2021-11-06 RX ORDER — IBUPROFEN 400 MG/1
400 TABLET ORAL EVERY 6 HOURS PRN
Status: DISCONTINUED | OUTPATIENT
Start: 2021-11-06 | End: 2021-11-09 | Stop reason: HOSPADM

## 2021-11-06 RX ORDER — RISPERIDONE 1 MG/1
2 TABLET ORAL 2 TIMES DAILY
Status: CANCELLED | OUTPATIENT
Start: 2021-11-06

## 2021-11-06 RX ORDER — ECHINACEA PURPUREA EXTRACT 125 MG
2 TABLET ORAL AS NEEDED
Status: DISCONTINUED | OUTPATIENT
Start: 2021-11-06 | End: 2021-11-09 | Stop reason: HOSPADM

## 2021-11-06 RX ORDER — BUPROPION HYDROCHLORIDE 100 MG/1
300 TABLET ORAL 2 TIMES DAILY
Status: CANCELLED | OUTPATIENT
Start: 2021-11-06

## 2021-11-06 RX ORDER — ACETAMINOPHEN 325 MG/1
650 TABLET ORAL EVERY 6 HOURS PRN
Status: DISCONTINUED | OUTPATIENT
Start: 2021-11-06 | End: 2021-11-06

## 2021-11-06 RX ORDER — TRAZODONE HYDROCHLORIDE 50 MG/1
50 TABLET ORAL NIGHTLY PRN
Status: DISCONTINUED | OUTPATIENT
Start: 2021-11-06 | End: 2021-11-09 | Stop reason: HOSPADM

## 2021-11-06 RX ORDER — LOPERAMIDE HYDROCHLORIDE 2 MG/1
2 CAPSULE ORAL
Status: DISCONTINUED | OUTPATIENT
Start: 2021-11-06 | End: 2021-11-09 | Stop reason: HOSPADM

## 2021-11-06 RX ORDER — FAMOTIDINE 20 MG/1
20 TABLET, FILM COATED ORAL 2 TIMES DAILY PRN
Status: DISCONTINUED | OUTPATIENT
Start: 2021-11-06 | End: 2021-11-09 | Stop reason: HOSPADM

## 2021-11-06 RX ORDER — BUPROPION HYDROCHLORIDE 100 MG/1
300 TABLET ORAL 2 TIMES DAILY
COMMUNITY
End: 2021-11-09 | Stop reason: HOSPADM

## 2021-11-06 RX ORDER — RISPERIDONE 2 MG/1
2 TABLET ORAL 2 TIMES DAILY
COMMUNITY
End: 2021-11-09 | Stop reason: HOSPADM

## 2021-11-06 RX ORDER — MIRTAZAPINE 15 MG/1
15 TABLET, FILM COATED ORAL NIGHTLY
Status: DISCONTINUED | OUTPATIENT
Start: 2021-11-06 | End: 2021-11-09 | Stop reason: HOSPADM

## 2021-11-06 RX ORDER — MIRTAZAPINE 15 MG/1
15 TABLET, FILM COATED ORAL DAILY
COMMUNITY

## 2021-11-06 RX ORDER — ALUMINA, MAGNESIA, AND SIMETHICONE 2400; 2400; 240 MG/30ML; MG/30ML; MG/30ML
15 SUSPENSION ORAL EVERY 6 HOURS PRN
Status: DISCONTINUED | OUTPATIENT
Start: 2021-11-06 | End: 2021-11-09 | Stop reason: HOSPADM

## 2021-11-06 RX ORDER — ONDANSETRON 4 MG/1
4 TABLET, FILM COATED ORAL EVERY 6 HOURS PRN
Status: DISCONTINUED | OUTPATIENT
Start: 2021-11-06 | End: 2021-11-09 | Stop reason: HOSPADM

## 2021-11-06 RX ORDER — BENZTROPINE MESYLATE 1 MG/ML
1 INJECTION INTRAMUSCULAR; INTRAVENOUS ONCE AS NEEDED
Status: DISCONTINUED | OUTPATIENT
Start: 2021-11-06 | End: 2021-11-09 | Stop reason: HOSPADM

## 2021-11-06 RX ORDER — OLANZAPINE 5 MG/1
5 TABLET ORAL NIGHTLY
Status: DISCONTINUED | OUTPATIENT
Start: 2021-11-06 | End: 2021-11-09 | Stop reason: HOSPADM

## 2021-11-06 RX ORDER — BENZTROPINE MESYLATE 1 MG/1
2 TABLET ORAL ONCE AS NEEDED
Status: DISCONTINUED | OUTPATIENT
Start: 2021-11-06 | End: 2021-11-09 | Stop reason: HOSPADM

## 2021-11-06 RX ADMIN — OLANZAPINE 5 MG: 5 TABLET, FILM COATED ORAL at 20:25

## 2021-11-06 RX ADMIN — MIRTAZAPINE 15 MG: 15 TABLET, FILM COATED ORAL at 20:25

## 2021-11-06 NOTE — NURSING NOTE
"I spoke with state guardian Pratima Esquivel who called in and stated \"Patient has to sign his own consent for admission, treatment, and medication administration for psychiatric hospitalization, I will authorize secondary consent for all.\" I took report from nurse at [facility] and told him the above also.  "

## 2021-11-06 NOTE — PLAN OF CARE
Problem: Adult Behavioral Health Plan of Care  Goal: Plan of Care Review  Outcome: Ongoing, Progressing  Flowsheets  Taken 11/6/2021 1436 by Hilda William  Consent Given to Review Plan with: State guardian  Progress: improving  Plan of Care Reviewed With: patient  Outcome Summary:   Therapist met with patient to review plan of care   patient agreeable.  Taken 11/6/2021 1126 by Sarah Mosher RN  Patient Agreement with Plan of Care: agrees  Goal: Patient-Specific Goal (Individualization)  Outcome: Ongoing, Progressing  Flowsheets  Taken 11/6/2021 1436  Patient-Specific Goals (Include Timeframe): Identify 2-3 healthy coping skills, deny SI/HI, address relapse prevention, complete aftercare planning, and complete safety planning prior to discharge.  Individualized Care Needs: Therapist to offer 1-4 indivudal sessions, daily groups, family education, aftercare recommendations, safety planning, and brief CBT/MI interventions during hospitalization.  Anxieties, Fears or Concerns: None verbalized  Taken 11/6/2021 1417  Patient Personal Strengths:   expressive of emotions   expressive of needs   resilient   resourceful   positive attitude   spiritual/Sabianist support   stable living environment   coping skills  Patient Vulnerabilities: lacks insight into illness  Goal: Optimized Coping Skills in Response to Life Stressors  Outcome: Ongoing, Progressing  Intervention: Promote Effective Coping Strategies  Flowsheets (Taken 11/6/2021 1436)  Supportive Measures:   active listening utilized   counseling provided   decision-making supported   goal setting facilitated   problem-solving facilitated   self-responsibility promoted   self-reflection promoted   positive reinforcement provided   self-care encouraged   relaxation techniques promoted   verbalization of feelings encouraged  Goal: Develops/Participates in Therapeutic [place] to Support Successful Transition  Outcome: Ongoing, Progressing  Intervention: Foster  Therapeutic [place]  Flowsheets (Taken 11/6/2021 1126 by Sarah Mosher, RN)  Trust Relationship/Rapport:   care explained   choices provided   emotional support provided   empathic listening provided   questions answered   questions encouraged   reassurance provided   thoughts/feelings acknowledged  Intervention: Mutually Develop Transition Plan  Flowsheets  Taken 11/6/2021 1436 by Hilda William  Outpatient/Agency/Support Group Needs: (Personal care home)   outpatient psychiatric care (specify)   outpatient medication management   outpatient counseling   other (see comments)  Discharge Coordination/Progress: Patient has insurance and will need transportation. Patient is agreeable to return to [facility].  Transition Support:   community resources reviewed   follow-up care discussed  Transportation Anticipated:   public transportation   health plan transportation  Anticipated Discharge Disposition: group home  Transportation Concerns: car, none  Current Discharge Risk: psychiatric illness  Concerns to be Addressed:   suicidal   mental health   coping/stress   adjustment to diagnosis/illness  Readmission Within the Last 30 Days: no previous admission in last 30 days  Patient's Choice of Community Agency(s): [facility]  Offered/Gave Vendor List: no  Taken 11/6/2021 1127 by Sarah Mosher, RN  Patient/Family Anticipated Services at Transition: mental health services  Patient/Family Anticipates Transition to: ([facility]) other (see comments)   Goal Outcome Evaluation:  Plan of Care Reviewed With: patient  Consent Given to Review Plan with: State guardian  Progress: improving  Outcome Summary: Therapist met with patient to review plan of care; patient agreeable.       DATA:       Therapist discussed case with RN and met with patient today to review coping skills, review plan of care, and discuss discharge.    Patient has a state guardian, Lori Lewis.     Therapist recommended outpatient counseling and medication  management.    Patient is from [facility] and plans to go back to discharge if [facility] is agreeable.      Clinical Maneuvering/Intervention:     Therapist assisted patient in processing above session content; acknowledged and normalized patient’s thoughts, feelings, and concerns.  Discussed the therapist/patient relationship and explain the parameters and limitations of relative confidentiality.  Also discussed the importance of active participation, and honesty to the treatment process.  Encouraged the patient to discuss/vent their feelings, frustrations, and fears concerning their ongoing medical issues and validated their feelings.     Allowed patient to freely discuss issues without interruption or judgment. Provided safe, confidential environment to facilitate the development of positive therapeutic relationship and encourage open, honest communication.      Therapist addressed discharge safety planning this date. Assisted patient in identifying risk factors which would indicate the need for higher level of care after discharge;  including thoughts to harm self or others and/or self-harming behavior. Encouraged patient to call 911, or present to the nearest emergency room should any of these events occur. Discussed crisis intervention services and means to access.  Encouraged securing any objects of harm.    Therapist completed integrated summary, treatment plan, and initiated social history this date.  Therapist is strongly encouraging family involvement in treatment.       Encouraged mask wearing, social distancing, and regular hand washing due to COVID19 risk.      ASSESSMENT:     Patient is a 36 year old male who presented to the ED at [facility] for suicidal ideations. Patient is a direct admit from [facility].      Therapist met 1:1 with patient today. Patient endorses intermittent suicidal ideation, reports SI prior to admission with thoughts of hanging himself. Patient denies  homicidal ideation. Patient endorses AVH. Patient reports feeling paranoid, like people are out to get him. Patient reports racing thoughts and feeling overwhelmed. Patient rates anxiety as 10/10 and depression 10/10. Patient presents as calm, cooperative, and polite. Patient presents with psychomotor agitation, constantly moving throughout assessment.     Patient reports he has been living in [place] for almost 3 months and he likes it there.  Patient identifies his father's skin cancer and being away from home as stressors. Patient reports he has not been able to see his father much since he went to [facility] and this makes him feel depressed. Patient reports feeling anxious about his father having skin cancer. Patient reports he enjoys talking to his friends, playing basketball, listening to music, and watching TV.      PLAN:       Patient to remain hospitalized this date.      Treatment team will focus efforts on stabilizing patient's acute symptoms while providing education on healthy coping and crisis management to reduce hospitalizations.   Patient requires daily psychiatrist evaluation and 24/7 nursing supervision to promote patient  safety.     Therapist will offer 1-4 individual sessions, 1 therapy group daily, family education, and appropriate referral.    Treatment team to follow up with Lori Lewis on Monday and [facility].

## 2021-11-06 NOTE — PLAN OF CARE
Goal Outcome Evaluation:            Patient came in today from [facility] and admission  was completed. Patient reports having suicidal thoughts denies plan.
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
## 2021-11-07 PROCEDURE — 99232 SBSQ HOSP IP/OBS MODERATE 35: CPT | Performed by: PSYCHIATRY & NEUROLOGY

## 2021-11-07 RX ORDER — BUPROPION HYDROCHLORIDE 150 MG/1
150 TABLET ORAL DAILY
Status: DISCONTINUED | OUTPATIENT
Start: 2021-11-08 | End: 2021-11-09 | Stop reason: HOSPADM

## 2021-11-07 RX ADMIN — HYDROXYZINE HYDROCHLORIDE 50 MG: 50 TABLET ORAL at 12:24

## 2021-11-07 RX ADMIN — METOPROLOL TARTRATE 12.5 MG: 25 TABLET, FILM COATED ORAL at 20:02

## 2021-11-07 RX ADMIN — MIRTAZAPINE 15 MG: 15 TABLET, FILM COATED ORAL at 20:02

## 2021-11-07 RX ADMIN — Medication 1 PATCH: at 11:39

## 2021-11-07 RX ADMIN — OLANZAPINE 5 MG: 5 TABLET, FILM COATED ORAL at 20:02

## 2021-11-07 NOTE — NURSING NOTE
"REASSURED THE CLIENT THAT THESE MEDICATIONS ARE NOW ORDERED. CLIENT SEEMED RELIEVED AND SAID, \"THANK YOU VERY MUCH, THAT MAKES ME FEEL BETTER\".    "

## 2021-11-07 NOTE — PLAN OF CARE
Goal Outcome Evaluation:  Plan of Care Reviewed With: patient  Patient Agreement with Plan of Care: agrees        Pt states anxiety 8, depression 7. He is calm and cooperative with staff, med compliant.

## 2021-11-07 NOTE — PROGRESS NOTES
"      Inpatient Psych Progress Note     Clinician: Vadim Lozada MD  Admission Date: 11/6/2021  08:12 EST 11/07/21    Behavioral Health Treatment Plan and Problem List: I have reviewed and approved the Behavioral Health Treatment Plan and Problem list.    Allergies  Allergies   Allergen Reactions   • Amoxicillin Hives   • Penicillins Hives   • Venlafaxine Unknown - High Severity       Hospital Day: 1 day      Assessment completed within view of staff    History  CC/clinical focus: depression     Interval HPI: Patient seen and evaluated by me.  Chart reviewed.   Patient rates  level of depression (subjectively) at a   8/10.  Anxiety  7 /10.  Patient tolerating meds okay.  Denies side effects.  Tolerating medication okay  Med Compliant.  ROS otherwise as below.      Interval Review of Systems:   General ROS: negative for - fever or malaise  Endocrine ROS: negative for - palpitations  Respiratory ROS: no cough, shortness of breath, or wheezing  Cardiovascular ROS: no chest pain or dyspnea on exertion  Gastrointestinal ROS: no abdominal pain,no black or bloody stools    /87 (BP Location: Right arm, Patient Position: Sitting)   Pulse 102   Temp 98.1 °F (36.7 °C) (Temporal)   Resp 18   Ht 172.7 cm (68\")   Wt 82.4 kg (181 lb 9.6 oz)   SpO2 98%   BMI 27.61 kg/m²     Mental Status Exam  Mood: dysphoric  Affect: dysphoric   Thought Processes: linear, logical, and goal directed  Thought Content: negativistic  Hallucinations: no  Suicidal Thoughts: denies  Suicidal Plan/Intent: denies  Hopelesness:Moderate  Homicidal Thoughts:  denies      Medical Decision Making:   Labs:     Lab Results (last 24 hours)     Procedure Component Value Units Date/Time    COVID PRE-OP / PRE-PROCEDURE SCREENING ORDER (NO ISOLATION) - Swab, Nasopharynx [[id number]]  (Normal) Collected: 11/06/21 1007    Specimen: Swab from Nasopharynx Updated: 11/06/21 1039    Narrative:      The following orders were created for panel order COVID PRE-OP " / PRE-PROCEDURE SCREENING ORDER (NO ISOLATION) - Swab, Nasopharynx.  Procedure                               Abnormality         Status                     ---------                               -----------         ------                     COVID-19 and FLU A/B PCR...[[id number]]  Normal              Final result                 Please view results for these tests on the individual orders.    COVID-19 and FLU A/B PCR - Swab, Nasopharynx [[id number]]  (Normal) Collected: 11/06/21 1007    Specimen: Swab from Nasopharynx Updated: 11/06/21 1039     COVID19 Not Detected     Influenza A PCR Not Detected     Influenza B PCR Not Detected    Narrative:      Fact sheet for providers: https://www.fda.gov/media/957003/download    Fact sheet for patients: https://www.fda.gov/media/654817/download    Test performed by PCR.            Radiology:     Imaging Results (Last 24 Hours)     ** No results found for the last 24 hours. **            EKG:     ECG/EMG Results (most recent)     None           Medications:  mirtazapine, 15 mg, Oral, Nightly  nicotine, 1 patch, Transdermal, Q24H  OLANZapine, 5 mg, Oral, Nightly           All medications reviewed.      Assessment and Plan:    Depression with suicidal ideation  Borderline Intellectual Disabilty  - Continue Remeron 15mg QHS  - Continue Zyprexa 5mg QHS     Methamphetamine Abuse  THC Use disorder, mild, abuse  - CD Counseling      Continue hospitalization for safety and stabilization.  Continue current level of Special Precautions (q15 minute checks).

## 2021-11-07 NOTE — PLAN OF CARE
Goal Outcome Evaluation:  Plan of Care Reviewed With: patient  Patient Agreement with Plan of Care: agrees     Progress: no change  Outcome Summary: CLIENT CONTINUES TO REPORT HIGH LEVELS OF ANXIETY AND DEPRESSION, DENIES SI AND HI DURING AM NURSING ASSESSMENT. CLIENT REQUIRED PRN VISTARIL FOR ANXIETY DURING THIS SHIFT, SYMPTOMS IMPROVED AFTER MEDICATION.

## 2021-11-07 NOTE — NURSING NOTE
CONTACTED DR. RIVERA R/T CLIENT'S CONCERNS THAT HIS LOPRESSOR AND WELLBUTRIN ARE NOT ORDERED FOR HIM TO TAKE HERE. CLIENT IS ANXIOUS PER HIS REPORT AND PACING R/T HIS MEDICATION CONCERNS. REPORTED BLOOD PRESSURE /85 AND HEART RATE 107. READ HOME MED REC FOR THESE MEDICINES TO DR. RIVERA, PLEASE SEE MEDICATION ORDERS.

## 2021-11-08 PROCEDURE — 99232 SBSQ HOSP IP/OBS MODERATE 35: CPT | Performed by: PSYCHIATRY & NEUROLOGY

## 2021-11-08 RX ADMIN — BUPROPION HYDROCHLORIDE 150 MG: 150 TABLET, FILM COATED, EXTENDED RELEASE ORAL at 08:22

## 2021-11-08 RX ADMIN — METOPROLOL TARTRATE 12.5 MG: 25 TABLET, FILM COATED ORAL at 08:22

## 2021-11-08 RX ADMIN — METOPROLOL TARTRATE 12.5 MG: 25 TABLET, FILM COATED ORAL at 20:11

## 2021-11-08 RX ADMIN — IBUPROFEN 400 MG: 400 TABLET, FILM COATED ORAL at 19:27

## 2021-11-08 RX ADMIN — MIRTAZAPINE 15 MG: 15 TABLET, FILM COATED ORAL at 20:12

## 2021-11-08 RX ADMIN — Medication 1 PATCH: at 08:23

## 2021-11-08 RX ADMIN — OLANZAPINE 5 MG: 5 TABLET, FILM COATED ORAL at 20:12

## 2021-11-08 NOTE — PROGRESS NOTES
"INPATIENT PSYCHIATRIC PROGRESS NOTE    Name:  Cruz Mims  :  1985  MRN:  [id number]  Visit Number:  [id number]  Length of stay:  2    Behavioral Health Treatment Plan and Problem List: I have reviewed and approved the Behavioral Health Treatment Plan and Problem list.    SUBJECTIVE    CC/ Focus of exam: Depression    Patient's subjective status: \"I'm a lot better\"    INTERVAL HISTORY: Patient pleasant and cooperative this morning denying being depressed stating he has become upset with awareness that his father had a recurrence of cancer and had threatened to hang himself.  It is anticipate his returning to the [facility] facility in [place] probably tomorrow, patient states he likes it there where he has been for the past 2 months.  He is in a state guardianship program.  Need to orchestrate follow-up at the [facility] clinic.    Depression rating 0/10  Anxiety rating 0/10  Sleep: Slept well  Withdrawal sx: None  Craving: Denies       ROS: No cardiovascular, GI, or Neurological complaints.       OBJECTIVE    Vitals:    21 0738   BP: 117/67   Pulse: 74   Resp: 18   Temp: 97.1 °F (36.2 °C)   SpO2: 97%      Temp:  [97.1 °F (36.2 °C)-97.7 °F (36.5 °C)] 97.1 °F (36.2 °C)  Heart Rate:  [] 74  Resp:  [18] 18  BP: (117-131)/(67-86) 117/67    MENTAL STATUS EXAM:    Psychomotor: No psychomotor agitation/retardation, No EPS, No motor tics  Speech-normal rate, amount.  Mood/Affect:  Appropriate  Thought Processes:  associations intact  Thought Content:  mood congruent  Hallucination(s): none  Hopelessness: No  Optimistic:minimally  Suicidal Thoughts:   No  Suicidal Plan/Intent:  No  Homicidal Thoughts:  absent  Orientation: oriented x 3  Memory: recent intact    Lab Results (last 24 hours)     ** No results found for the last 24 hours. **           Imaging Results (Last 24 Hours)     ** No results found for the last 24 hours. **           ECG/EMG Results (most recent)     None       "     ALLERGIES: Amoxicillin, Penicillins, and Venlafaxine      Current Facility-Administered Medications:   •  aluminum-magnesium hydroxide-simethicone (MAALOX MAX) 400-400-40 MG/5ML suspension 15 mL, 15 mL, Oral, Q6H PRN, Vadim Lozada MD  •  benzonatate (TESSALON) capsule 100 mg, 100 mg, Oral, TID PRN, Vadim Lozada MD  •  benztropine (COGENTIN) tablet 2 mg, 2 mg, Oral, Once PRN **OR** benztropine (COGENTIN) injection 1 mg, 1 mg, Intramuscular, Once PRN, Vadim Lozada MD  •  buPROPion XL (WELLBUTRIN XL) 24 hr tablet 150 mg, 150 mg, Oral, Daily, Barrera Burt MD, 150 mg at 11/08/21 0822  •  famotidine (PEPCID) tablet 20 mg, 20 mg, Oral, BID PRN, Vadim Lozada MD  •  hydrOXYzine (ATARAX) tablet 50 mg, 50 mg, Oral, Q6H PRN, Vadim Lozada MD, 50 mg at 11/07/21 1224  •  ibuprofen (ADVIL,MOTRIN) tablet 400 mg, 400 mg, Oral, Q6H PRN, Vadim Lozada MD  •  loperamide (IMODIUM) capsule 2 mg, 2 mg, Oral, Q2H PRN, Vadim Lozada MD  •  magnesium hydroxide (MILK OF MAGNESIA) suspension 10 mL, 10 mL, Oral, Daily PRN, Vadim Lozada MD  •  metoprolol tartrate (LOPRESSOR) tablet 12.5 mg, 12.5 mg, Oral, Q12H, Barrera Burt MD, 12.5 mg at 11/08/21 0822  •  mirtazapine (REMERON) tablet 15 mg, 15 mg, Oral, Nightly, Vadim Lozada MD, 15 mg at 11/07/21 2002  •  nicotine (NICODERM CQ) 21 MG/24HR patch 1 patch, 1 patch, Transdermal, Q24H, Vadim Lozada MD, 1 patch at 11/08/21 0823  •  OLANZapine (zyPREXA) tablet 5 mg, 5 mg, Oral, Nightly, Vadim Lozada MD, 5 mg at 11/07/21 2002  •  ondansetron (ZOFRAN) tablet 4 mg, 4 mg, Oral, Q6H PRN, Vadim Lozada MD  •  sodium chloride nasal spray 2 spray, 2 spray, Each Nare, PRN, Vadim Lozada MD  •  traZODone (DESYREL) tablet 50 mg, 50 mg, Oral, Nightly PRN, Vadim Lozada MD    ASSESSMENT & PLAN    Other recurrent depressive disorder  Wellbutrin, Remeron, Zyprexa plus supportive individual group psychotherapy, anticipate patient's return to the  [facility] facility in [facility]   ADHD  Wellbutrin  Borderline intellectual disability  Incorporate into the psychotherapeutic effort  Cannabis abuse  Encourage abstinence    Special precautions: Special Precautions Level 3 (q15 min checks)     Behavioral Health Treatment Plan and Problem List: I have reviewed and approved the Behavioral Health Treatment Plan and Problem list.    I spent a total of 26 minutes in direct patient care including  17 minutes face to face with the patient assessment, coordination of care, and counseling the patient on the current and follow-up treatment plans regarding his status and situation.  Patient had no additional questions.     SHERLYN Lozada MD    Clinician:  Carlton Lozada MD  11/08/21  09:55 EST    Dictated utilizing Dragon dictation

## 2021-11-08 NOTE — PLAN OF CARE
Problem: Adult Behavioral Health Plan of Care  Goal: Plan of Care Review  Outcome: Ongoing, Progressing  Flowsheets  Taken 11/8/2021 1311  Progress: improving  Outcome Summary: PATIENT VERBALIZES MODERATE ANXIETY AND DEPRESSION AS HIS ONLY PROBLEMS THIS SHIFT. PATIENT IS AOX3 AND COOPERATIVE WITH NO S/S OF ACUTE DISTRESS NOTED. NNO NOTED.  Taken 11/8/2021 0831  Plan of Care Reviewed With: patient  Patient Agreement with Plan of Care: agrees   Goal Outcome Evaluation:  Plan of Care Reviewed With: patient  Patient Agreement with Plan of Care: agrees     Progress: improving  Outcome Summary: PATIENT VERBALIZES MODERATE ANXIETY AND DEPRESSION AS HIS ONLY PROBLEMS THIS SHIFT. PATIENT IS AOX3 AND COOPERATIVE WITH NO S/S OF ACUTE DISTRESS NOTED. NNO NOTED.

## 2021-11-08 NOTE — PROGRESS NOTES
Navigator is helping Primary Therapist with discharge planning for patient. Kelechi contacted Cami at [facility]. Patient can return there as long as he is willing to. Treatment team to contact Cami when we know an estimated discharge day.     [facility] - [phone]

## 2021-11-08 NOTE — PLAN OF CARE
Goal Outcome Evaluation:  Plan of Care Reviewed With: patient  Patient Agreement with Plan of Care: agrees        Outcome Summary: Patient rates anxiety and depression at 7.  Denies SI/HI or AVH.

## 2021-11-08 NOTE — NURSING NOTE
[address] [employer] GU[employer]IAN, MAYLIN ELIZALDE, CALLED TO CHECK ON THE PATIENT. LEFT CONTACT NUMBERS. [phone]. AFTER HOUR/WEEKENDS [phone]

## 2021-11-08 NOTE — PLAN OF CARE
Problem: Adult Behavioral Health Plan of Care  Goal: Optimized Coping Skills in Response to Life Stressors  Outcome: Ongoing, Progressing  Intervention: Promote Effective Coping Strategies  Flowsheets (Taken 11/8/2021 0831 by Yahir Perez, RN)  Supportive Measures:   active listening utilized   positive reinforcement provided   verbalization of feelings encouraged  Goal: Develops/Participates in Therapeutic [place] to Support Successful Transition  Outcome: Ongoing, Progressing  Intervention: Foster Therapeutic [place]  Flowsheets (Taken 11/8/2021 0831 by Yahir Perez, RN)  Trust Relationship/Rapport:   care explained   choices provided   emotional support provided   empathic listening provided   questions answered   questions encouraged   reassurance provided   thoughts/feelings acknowledged  Intervention: Mutually Develop Transition Plan  Flowsheets (Taken 11/6/2021 1436 by Hilda William)  Transition Support:   community resources reviewed   follow-up care discussed    1200    DATA:         Therapist met individually with patient this date to introduce role and to discuss hospitalization expectations. Patient agreeable. Staffed case with Dr. Lozada and RN staff. No major issues.      Therapist called state guardian Lori Lewis and staffed. No major issues. She provided verbal consent for patient to return to [facility] at discharge. She denies major issues there, but states that patient has had some adjustment issues going from father's guardianship to placement at [facility].       Clinical Maneuvering/Intervention:     Therapist assisted patient in processing above session content; acknowledged and normalized patient’s thoughts, feelings, and concerns.  Discussed the therapist/patient relationship and explain the parameters and limitations of relative confidentiality.  Also discussed the importance of active participation, and honesty to the treatment process.  Encouraged the patient  to discuss/vent their feelings, frustrations, and fears concerning their ongoing medical issues and validated their feelings.       Allowed patient to freely discuss issues without interruption or judgment. Provided safe, confidential environment to facilitate the development of positive therapeutic relationship and encourage open, honest communication.      Therapist addressed discharge safety planning this date. Assisted patient in identifying risk factors which would indicate the need for higher level of care after discharge;  including thoughts to harm self or others and/or self-harming behavior. Encouraged patient to call 911, or present to the nearest emergency room should any of these events occur. Discussed crisis intervention services and means to access.  Encouraged securing any objects of harm.          ASSESSMENT:      The patient was seen 1-1 this date. Patient calm, cooperative, and pleasant.  Patient socializing well with others and attending group this date. Patient denies SI/HI and AVH.  States he would like to return to [facility] tomorrow.      PLAN:       Patient to remain hospitalized this date.     Treatment team will focus efforts on stabilizing patient's acute symptoms while providing education on healthy coping and crisis management to reduce hospitalizations.   Patient requires daily psychiatrist evaluation and 24/7 nursing supervision to promote patient  safety.     Therapist will offer 1-4 individual sessions, 1 therapy group daily, family education, and appropriate referral.    Therapist recommends outpatient psych referral + [facility].  State guardian Lori Lewis provided verbal consent for [facility] and [facility].  Patient will need public transport at discharge.

## 2021-11-09 VITALS
BODY MASS INDEX: 27.52 KG/M2 | HEART RATE: 96 BPM | TEMPERATURE: 98.2 F | HEIGHT: 68 IN | SYSTOLIC BLOOD PRESSURE: 148 MMHG | DIASTOLIC BLOOD PRESSURE: 83 MMHG | OXYGEN SATURATION: 96 % | WEIGHT: 181.6 LBS | RESPIRATION RATE: 18 BRPM

## 2021-11-09 PROCEDURE — 99239 HOSP IP/OBS DSCHRG MGMT >30: CPT | Performed by: PSYCHIATRY & NEUROLOGY

## 2021-11-09 RX ORDER — BUPROPION HYDROCHLORIDE 300 MG/1
300 TABLET ORAL EVERY MORNING
Qty: 30 TABLET | Refills: 0 | Status: SHIPPED | OUTPATIENT
Start: 2021-11-09 | End: 2022-11-09

## 2021-11-09 RX ORDER — BUPROPION HYDROCHLORIDE 150 MG/1
150 TABLET ORAL EVERY MORNING
Qty: 30 TABLET | Refills: 0 | Status: SHIPPED | OUTPATIENT
Start: 2021-11-09 | End: 2022-11-09

## 2021-11-09 RX ADMIN — BUPROPION HYDROCHLORIDE 150 MG: 150 TABLET, FILM COATED, EXTENDED RELEASE ORAL at 09:33

## 2021-11-09 RX ADMIN — Medication 1 PATCH: at 09:32

## 2021-11-09 RX ADMIN — METOPROLOL TARTRATE 12.5 MG: 25 TABLET, FILM COATED ORAL at 09:33

## 2021-11-09 NOTE — PLAN OF CARE
Goal Outcome Evaluation:  Plan of Care Reviewed With: patient  Patient Agreement with Plan of Care: agrees        Pt denies all. He is calm and cooperative with staff, med compliant.

## 2021-11-09 NOTE — DISCHARGE SUMMARY
Date of Discharge:  11/9/2021    Discharge Diagnosis:Principal Problem:    Other recurrent depressive disorders (HCC)  Active Problems:    Borderline intellectual disability    ADHD (attention deficit hyperactivity disorder)        Presenting Problem/History of Present Illness:Patient was admitted to the hospital on 11/6  having presented depressed voicing SI, voluntarily admitted for safety evaluation and treatment, see admission note for details.         Hospital Course:     Patient was admitted for safety and stabilization and was placed on standard precautions.  Routine labs were checked.  Patient was assigned a masters level therapist and provided with an opportunity to participate in group and individual therapy on the unit.  Patient seen on a daily basis for evaluation and supportive therapy. Patient's dysphoria cleared rapidly, he was relieved with discussions about his concern for his father's health. The Wellbutrin was continued at 150 mg daily, not the 600 mg that was reported he was taking PTA. At discharge was Rx'ed 450 mg daily. Patient voiced that he was pleased with the [facility] in [place] and was happy to return there at discharge.  Patient's guardian Lori Lewis was consulted throughout the patient's hospital stay.  At discharge patient clearly psychotic thought content or any thoughts of harming self or others.  See below for medications.    Consults:   Consults     No orders found from 10/8/2021 to 11/7/2021.          Labs:  Lab Results (all)     Procedure Component Value Units Date/Time    COVID PRE-OP / PRE-PROCEDURE SCREENING ORDER (NO ISOLATION) - Swab, Nasopharynx [[id number]]  (Normal) Collected: 11/06/21 1007    Specimen: Swab from Nasopharynx Updated: 11/06/21 1039    Narrative:      The following orders were created for panel order COVID PRE-OP / PRE-PROCEDURE SCREENING ORDER (NO ISOLATION) - Swab, Nasopharynx.  Procedure                               Abnormality          Status                     ---------                               -----------         ------                     COVID-19 and FLU A/B PCR...[[id number]]  Normal              Final result                 Please view results for these tests on the individual orders.    COVID-19 and FLU A/B PCR - Swab, Nasopharynx [[id number]]  (Normal) Collected: 11/06/21 1007    Specimen: Swab from Nasopharynx Updated: 11/06/21 1039     COVID19 Not Detected     Influenza A PCR Not Detected     Influenza B PCR Not Detected    Narrative:      Fact sheet for providers: https://www.fda.gov/media/291697/download    Fact sheet for patients: https://www.fda.gov/media/470976/download    Test performed by PCR.          Imaging:  Imaging Results (All)     None          Condition on Discharge:  improved    Prognosis: fair    Vital Signs  Temp:  [97.5 °F (36.4 °C)-98.2 °F (36.8 °C)] 98.2 °F (36.8 °C)  Heart Rate:  [64-98] 98  Resp:  [16-18] 18  BP: (123-126)/(77-84) 126/84    Discharge Disposition  Long Term Care (DC - External)         Discharge Medications      New Medications      Instructions Start Date   buPROPion  MG 24 hr tablet  Commonly known as: Wellbutrin XL  Replaces: buPROPion 100 MG tablet   150 mg, Oral, Every Morning      buPROPion  MG 24 hr tablet  Commonly known as: Wellbutrin XL   300 mg, Oral, Every Morning         Continue These Medications      Instructions Start Date   metoprolol tartrate 25 MG tablet  Commonly known as: LOPRESSOR   25 mg, Oral, 2 Times Daily      mirtazapine 15 MG tablet  Commonly known as: REMERON   15 mg, Oral, Daily         Stop These Medications    buPROPion 100 MG tablet  Commonly known as: WELLBUTRIN  Replaced by: buPROPion  MG 24 hr tablet     risperiDONE 2 MG tablet  Commonly known as: risperDAL            Discharge Diet: regular    Activity at Discharge: no restrictions    Follow-up Appointments:    [facility]    [facility]  [address]  [address]  [phone]     November 12 2021 at 8:15am with Heidi Lozada MD  11/09/21  10:53 EST  Time spent with the discharge process >30 minutes.     Dictated utilizing Dragon dictation

## 2021-11-09 NOTE — NURSING NOTE
Pt. Had been given his personal belongings bag for discharge upon checking for pt.noted  he   was coming from  his room room with bag he told this R.N. he had flushed his wellbutrin down commode because he didn't need that he had these at [facility] .I explained to him that it was important for him to tell exactly what he had done with the pills he verbalizes that he did flushed them . I then called DR. Lozada and he verbalizes  he wouldn't refill the rx.'d I inform Hilda of what pt.reported notified [facility] spoke to Albino she verbalizes pt. Was doing good he had been talking ,drinking coffee she then talked to pt.and had him on speaker phone  She asked him to tell her what he had done with pills he told  her he flushed them that he would not lied she verbalizes will notified his DR there to informed him.I then talked to our apothcary they verbalize pt. should be showing signs of being lethargic if he had taken .

## 2021-11-09 NOTE — PLAN OF CARE
Goal Outcome Evaluation:  Plan of Care Reviewed With: patient  Patient Agreement with Plan of Care: agrees     Progress: improving  Outcome Summary: pt. hai depression /denies anxiety denies a/v/h deneis s/i denies h/i pt. karol just doesn't like living @ [facility] being lock up no co's voiced pt. diischarged today

## 2021-11-09 NOTE — PROGRESS NOTES
Navigator attempted to reach Cami at [facility]. She was unavailable at this time. Spoke with facility RN and she confirmed that patient can return today. Treatment team will need to call RN back if patient is not discharged today.     [facility] - [phone]          [place] [address] [address] [address]  [phone]    November 12 2021 at 8:15am with Heidi

## 2021-11-09 NOTE — PROGRESS NOTES
0952:     Therapist has conducted discharge planning for patient.  Confirmed with state guardian Lori Lewis that patient may return to placement at [facility].  We have also confirmed that patient can return to [facility] at anytime.  Thank you to navigator Martha for scheduling [facility] as well.  No other issues identified this date. Received permission from state guardian Lori Lewis for patient to use [facility].  Also spoke with Cami at [facility] and confirmed that patient can return there today and that they use [facility] for his transport.     [facility]  [address] [address]  [phone]     November 12 2021 at 8:15am with Heidi

## 2021-11-09 NOTE — DISCHARGE INSTR - APPOINTMENTS
Patient to return to [facility] on 11/9/2021      [facility]  [address] [id number] [phone]    November 12 2021 at 8:15am with Heidi

## 2021-11-11 ENCOUNTER — TELEPHONE (OUTPATIENT)
Dept: PSYCHIATRY | Facility: HOSPITAL | Age: 36
End: 2021-11-11

## 2021-11-11 ENCOUNTER — DOCUMENTATION (OUTPATIENT)
Dept: PSYCHIATRY | Facility: HOSPITAL | Age: 36
End: 2021-11-11

## 2021-11-11 NOTE — PROGRESS NOTES
"1003:    Therapist contacted Cami at [facility] to follow up on patient's discharge.  Cami reports, \"He came back to us 11/9 at 3:00 p.m. 5:00 a.m. he was sent to ED with eyes fixated and foaming at the mouth and possible seizure. We had found medicine in his pocket.  I did not witness a seizure. We got him back 11/10 at 1030. Found medicine in his pocket on 11/9.  He had crushed residue on his night stand and pills on his bed.  After he returned we also found 12 Wellbutrin and a pill T102 on the top in his night stand. At this point, we are doing a 202A to send him to [facility] for overdose attempt.\" Cami reports that patient has a history of cheeking his medications at [facility] and that he is on a two person monitoring system.      Therapist updated Director Lisa Howell Breeding this date.    " ----- Message from Manasa Fields sent at 5/3/2019  2:38 PM CDT -----  Contact: [facility]  Type: Patient Call Back    Who called: Juhi Casarez    What is the request in detail: [facility] requesting a call back in ref to triamterene-hydrochlorothiazide 37.5-25 mg (MAXZIDE-25) 37.5-25 mg per tablet. Pharmacy states that the pt informed them that she is suppose to be taking Dyazide.    Can the clinic reply by [facility]? No    Would the patient rather a call back or a response via [facility]?  Call back    Best call back number:[phone]    Additional Information: [facility] [address] [place] [address] SANDIE BARBA AT [facility] OF [address] [phone] (Phone)  [phone] (Fax)

## 2021-11-11 NOTE — TELEPHONE ENCOUNTER
Call number  spoke to  Deanne Dang on call guardian for pt. To inform prior to pt leaving hospital  pt. Reported that he had flushed his medications this nurse & tech checked his bag & the bottles were empty with lids off Carmencita @ [facility] was called and inform of pt. Reporting he had flushed medications .

## 2021-11-11 NOTE — PROGRESS NOTES
1300:     Therapist received call back from Cami at [facility]. She has contacted medical records to receive another copy of discharge summary as this did not make it to facility via patient.  She reports that she is still waiting on 202A response. She reports that patient appears to be doing well and does not appear to be in any distress.  No other issues identified this date. Therapist updated Director Lisa Santamaria.

## 2024-08-10 PROCEDURE — 80074 ACUTE HEPATITIS PANEL: CPT | Performed by: PSYCHIATRY & NEUROLOGY

## 2024-08-11 ENCOUNTER — HOSPITAL ENCOUNTER (EMERGENCY)
Facility: HOSPITAL | Age: 39
Discharge: PSYCHIATRIC OR SUBSTANCE USE FACILITY | DRG: 885 | End: 2024-08-11
Attending: EMERGENCY MEDICINE | Admitting: EMERGENCY MEDICINE
Payer: MEDICARE

## 2024-08-11 ENCOUNTER — HOSPITAL ENCOUNTER (INPATIENT)
Facility: HOSPITAL | Age: 39
LOS: 3 days | Discharge: HOME | DRG: 885 | End: 2024-08-14
Attending: PSYCHIATRY & NEUROLOGY | Admitting: PSYCHIATRY & NEUROLOGY
Payer: MEDICARE

## 2024-08-11 VITALS
OXYGEN SATURATION: 99 % | HEART RATE: 104 BPM | SYSTOLIC BLOOD PRESSURE: 126 MMHG | RESPIRATION RATE: 17 BRPM | DIASTOLIC BLOOD PRESSURE: 85 MMHG | WEIGHT: 131 LBS | TEMPERATURE: 98.2 F | BODY MASS INDEX: 19.85 KG/M2 | HEIGHT: 68 IN

## 2024-08-11 DIAGNOSIS — R45.851 SUICIDAL IDEATIONS: Primary | ICD-10-CM

## 2024-08-11 PROBLEM — R45.89 SUICIDAL BEHAVIOR: Status: ACTIVE | Noted: 2024-08-11

## 2024-08-11 LAB
ALBUMIN SERPL-MCNC: 4.4 G/DL (ref 3.5–5.2)
ALBUMIN/GLOB SERPL: 1.5 G/DL
ALP SERPL-CCNC: 70 U/L (ref 39–117)
ALT SERPL W P-5'-P-CCNC: 33 U/L (ref 1–41)
AMPHET+METHAMPHET UR QL: POSITIVE
AMPHETAMINES UR QL: POSITIVE
ANION GAP SERPL CALCULATED.3IONS-SCNC: 15 MMOL/L (ref 5–15)
AST SERPL-CCNC: 31 U/L (ref 1–40)
BARBITURATES UR QL SCN: NEGATIVE
BASOPHILS # BLD AUTO: 0.07 10*3/MM3 (ref 0–0.2)
BASOPHILS NFR BLD AUTO: 1 % (ref 0–1.5)
BENZODIAZ UR QL SCN: NEGATIVE
BILIRUB SERPL-MCNC: 0.8 MG/DL (ref 0–1.2)
BILIRUB UR QL STRIP: ABNORMAL
BUN SERPL-MCNC: 16 MG/DL (ref 6–20)
BUN/CREAT SERPL: 21.6 (ref 7–25)
BUPRENORPHINE SERPL-MCNC: NEGATIVE NG/ML
CALCIUM SPEC-SCNC: 9.7 MG/DL (ref 8.6–10.5)
CANNABINOIDS SERPL QL: POSITIVE
CHLORIDE SERPL-SCNC: 102 MMOL/L (ref 98–107)
CLARITY UR: CLEAR
CO2 SERPL-SCNC: 24 MMOL/L (ref 22–29)
COCAINE UR QL: NEGATIVE
COLOR UR: ABNORMAL
CREAT SERPL-MCNC: 0.74 MG/DL (ref 0.76–1.27)
DEPRECATED RDW RBC AUTO: 43.9 FL (ref 37–54)
EGFRCR SERPLBLD CKD-EPI 2021: 119.7 ML/MIN/1.73
EOSINOPHIL # BLD AUTO: 0.08 10*3/MM3 (ref 0–0.4)
EOSINOPHIL NFR BLD AUTO: 1.1 % (ref 0.3–6.2)
ERYTHROCYTE [DISTWIDTH] IN BLOOD BY AUTOMATED COUNT: 13.2 % (ref 12.3–15.4)
ETHANOL BLD-MCNC: <10 MG/DL (ref 0–10)
ETHANOL UR QL: <0.01 %
FENTANYL UR-MCNC: NEGATIVE NG/ML
GLOBULIN UR ELPH-MCNC: 3 GM/DL
GLUCOSE SERPL-MCNC: 96 MG/DL (ref 65–99)
GLUCOSE UR STRIP-MCNC: NEGATIVE MG/DL
HAV IGM SERPL QL IA: ABNORMAL
HBV CORE IGM SERPL QL IA: ABNORMAL
HBV SURFACE AG SERPL QL IA: ABNORMAL
HCT VFR BLD AUTO: 44.3 % (ref 37.5–51)
HCV AB SER QL: REACTIVE
HGB BLD-MCNC: 14.7 G/DL (ref 13–17.7)
HGB UR QL STRIP.AUTO: NEGATIVE
HOLD SPECIMEN: NORMAL
HOLD SPECIMEN: NORMAL
IMM GRANULOCYTES # BLD AUTO: 0.02 10*3/MM3 (ref 0–0.05)
IMM GRANULOCYTES NFR BLD AUTO: 0.3 % (ref 0–0.5)
KETONES UR QL STRIP: ABNORMAL
LEUKOCYTE ESTERASE UR QL STRIP.AUTO: NEGATIVE
LYMPHOCYTES # BLD AUTO: 1.72 10*3/MM3 (ref 0.7–3.1)
LYMPHOCYTES NFR BLD AUTO: 23.8 % (ref 19.6–45.3)
MAGNESIUM SERPL-MCNC: 2.1 MG/DL (ref 1.6–2.6)
MCH RBC QN AUTO: 30.1 PG (ref 26.6–33)
MCHC RBC AUTO-ENTMCNC: 33.2 G/DL (ref 31.5–35.7)
MCV RBC AUTO: 90.8 FL (ref 79–97)
METHADONE UR QL SCN: NEGATIVE
MONOCYTES # BLD AUTO: 0.32 10*3/MM3 (ref 0.1–0.9)
MONOCYTES NFR BLD AUTO: 4.4 % (ref 5–12)
NEUTROPHILS NFR BLD AUTO: 5.03 10*3/MM3 (ref 1.7–7)
NEUTROPHILS NFR BLD AUTO: 69.4 % (ref 42.7–76)
NITRITE UR QL STRIP: NEGATIVE
NRBC BLD AUTO-RTO: 0 /100 WBC (ref 0–0.2)
OPIATES UR QL: NEGATIVE
OXYCODONE UR QL SCN: NEGATIVE
PCP UR QL SCN: NEGATIVE
PH UR STRIP.AUTO: 5.5 [PH] (ref 5–8)
PLATELET # BLD AUTO: 209 10*3/MM3 (ref 140–450)
PMV BLD AUTO: 10.2 FL (ref 6–12)
POTASSIUM SERPL-SCNC: 4.1 MMOL/L (ref 3.5–5.2)
PROT SERPL-MCNC: 7.4 G/DL (ref 6–8.5)
PROT UR QL STRIP: ABNORMAL
QT INTERVAL: 340 MS
QTC INTERVAL: 453 MS
RBC # BLD AUTO: 4.88 10*6/MM3 (ref 4.14–5.8)
SODIUM SERPL-SCNC: 141 MMOL/L (ref 136–145)
SP GR UR STRIP: >1.03 (ref 1–1.03)
TRICYCLICS UR QL SCN: NEGATIVE
UROBILINOGEN UR QL STRIP: ABNORMAL
WBC NRBC COR # BLD AUTO: 7.24 10*3/MM3 (ref 3.4–10.8)
WHOLE BLOOD HOLD COAG: NORMAL
WHOLE BLOOD HOLD SPECIMEN: NORMAL

## 2024-08-11 PROCEDURE — 82077 ASSAY SPEC XCP UR&BREATH IA: CPT | Performed by: EMERGENCY MEDICINE

## 2024-08-11 PROCEDURE — 99285 EMERGENCY DEPT VISIT HI MDM: CPT

## 2024-08-11 PROCEDURE — 93010 ELECTROCARDIOGRAM REPORT: CPT | Performed by: INTERNAL MEDICINE

## 2024-08-11 PROCEDURE — 81003 URINALYSIS AUTO W/O SCOPE: CPT | Performed by: EMERGENCY MEDICINE

## 2024-08-11 PROCEDURE — 36415 COLL VENOUS BLD VENIPUNCTURE: CPT

## 2024-08-11 PROCEDURE — 83735 ASSAY OF MAGNESIUM: CPT | Performed by: EMERGENCY MEDICINE

## 2024-08-11 PROCEDURE — 80053 COMPREHEN METABOLIC PANEL: CPT | Performed by: EMERGENCY MEDICINE

## 2024-08-11 PROCEDURE — 99223 1ST HOSP IP/OBS HIGH 75: CPT | Performed by: PSYCHIATRY & NEUROLOGY

## 2024-08-11 PROCEDURE — 85025 COMPLETE CBC W/AUTO DIFF WBC: CPT | Performed by: EMERGENCY MEDICINE

## 2024-08-11 PROCEDURE — 80074 ACUTE HEPATITIS PANEL: CPT | Performed by: PSYCHIATRY & NEUROLOGY

## 2024-08-11 PROCEDURE — 25010000002 DIPHENHYDRAMINE PER 50 MG: Performed by: PSYCHIATRY & NEUROLOGY

## 2024-08-11 PROCEDURE — 25010000002 HALOPERIDOL LACTATE PER 5 MG: Performed by: PSYCHIATRY & NEUROLOGY

## 2024-08-11 PROCEDURE — 25010000002 LORAZEPAM PER 2 MG: Performed by: PSYCHIATRY & NEUROLOGY

## 2024-08-11 PROCEDURE — 80307 DRUG TEST PRSMV CHEM ANLYZR: CPT | Performed by: EMERGENCY MEDICINE

## 2024-08-11 PROCEDURE — 93005 ELECTROCARDIOGRAM TRACING: CPT | Performed by: EMERGENCY MEDICINE

## 2024-08-11 RX ORDER — MIRTAZAPINE 15 MG/1
15 TABLET, FILM COATED ORAL DAILY
Status: DISCONTINUED | OUTPATIENT
Start: 2024-08-11 | End: 2024-08-12

## 2024-08-11 RX ORDER — HYDROXYZINE 50 MG/1
50 TABLET, FILM COATED ORAL EVERY 6 HOURS PRN
Status: DISCONTINUED | OUTPATIENT
Start: 2024-08-11 | End: 2024-08-14 | Stop reason: HOSPADM

## 2024-08-11 RX ORDER — OLANZAPINE 5 MG/1
5 TABLET ORAL NIGHTLY
Status: DISCONTINUED | OUTPATIENT
Start: 2024-08-11 | End: 2024-08-14 | Stop reason: HOSPADM

## 2024-08-11 RX ORDER — ECHINACEA PURPUREA EXTRACT 125 MG
2 TABLET ORAL AS NEEDED
Status: DISCONTINUED | OUTPATIENT
Start: 2024-08-11 | End: 2024-08-14 | Stop reason: HOSPADM

## 2024-08-11 RX ORDER — FAMOTIDINE 20 MG/1
20 TABLET, FILM COATED ORAL 2 TIMES DAILY PRN
Status: DISCONTINUED | OUTPATIENT
Start: 2024-08-11 | End: 2024-08-14 | Stop reason: HOSPADM

## 2024-08-11 RX ORDER — BUSPIRONE HYDROCHLORIDE 10 MG/1
10 TABLET ORAL 2 TIMES DAILY
Status: DISCONTINUED | OUTPATIENT
Start: 2024-08-11 | End: 2024-08-14 | Stop reason: HOSPADM

## 2024-08-11 RX ORDER — ONDANSETRON 4 MG/1
4 TABLET, ORALLY DISINTEGRATING ORAL EVERY 6 HOURS PRN
Status: DISCONTINUED | OUTPATIENT
Start: 2024-08-11 | End: 2024-08-14 | Stop reason: HOSPADM

## 2024-08-11 RX ORDER — CLONIDINE HYDROCHLORIDE 0.1 MG/1
0.1 TABLET ORAL DAILY
Status: ON HOLD | COMMUNITY
End: 2024-08-14

## 2024-08-11 RX ORDER — LORAZEPAM 2 MG/ML
2 INJECTION INTRAMUSCULAR ONCE
Status: COMPLETED | OUTPATIENT
Start: 2024-08-11 | End: 2024-08-11

## 2024-08-11 RX ORDER — BUPROPION HYDROCHLORIDE 150 MG/1
150 TABLET ORAL DAILY
Status: ON HOLD | COMMUNITY
End: 2024-08-14

## 2024-08-11 RX ORDER — BENZTROPINE MESYLATE 1 MG/1
2 TABLET ORAL ONCE AS NEEDED
Status: DISCONTINUED | OUTPATIENT
Start: 2024-08-11 | End: 2024-08-14 | Stop reason: HOSPADM

## 2024-08-11 RX ORDER — LOPERAMIDE HYDROCHLORIDE 2 MG/1
2 CAPSULE ORAL
Status: DISCONTINUED | OUTPATIENT
Start: 2024-08-11 | End: 2024-08-14 | Stop reason: HOSPADM

## 2024-08-11 RX ORDER — ALUMINA, MAGNESIA, AND SIMETHICONE 2400; 2400; 240 MG/30ML; MG/30ML; MG/30ML
15 SUSPENSION ORAL EVERY 6 HOURS PRN
Status: DISCONTINUED | OUTPATIENT
Start: 2024-08-11 | End: 2024-08-14 | Stop reason: HOSPADM

## 2024-08-11 RX ORDER — BUPROPION HYDROCHLORIDE 150 MG/1
150 TABLET ORAL DAILY
Status: CANCELLED | OUTPATIENT
Start: 2024-08-11

## 2024-08-11 RX ORDER — ACETAMINOPHEN 325 MG/1
650 TABLET ORAL EVERY 6 HOURS PRN
Status: DISCONTINUED | OUTPATIENT
Start: 2024-08-11 | End: 2024-08-14 | Stop reason: HOSPADM

## 2024-08-11 RX ORDER — BENZTROPINE MESYLATE 1 MG/ML
1 INJECTION INTRAMUSCULAR; INTRAVENOUS ONCE AS NEEDED
Status: DISCONTINUED | OUTPATIENT
Start: 2024-08-11 | End: 2024-08-14 | Stop reason: HOSPADM

## 2024-08-11 RX ORDER — POLYETHYLENE GLYCOL 3350 17 G/17G
17 POWDER, FOR SOLUTION ORAL DAILY PRN
Status: DISCONTINUED | OUTPATIENT
Start: 2024-08-11 | End: 2024-08-14 | Stop reason: HOSPADM

## 2024-08-11 RX ORDER — IBUPROFEN 400 MG/1
400 TABLET ORAL EVERY 6 HOURS PRN
Status: DISCONTINUED | OUTPATIENT
Start: 2024-08-11 | End: 2024-08-14 | Stop reason: HOSPADM

## 2024-08-11 RX ORDER — BENZONATATE 100 MG/1
100 CAPSULE ORAL 3 TIMES DAILY PRN
Status: DISCONTINUED | OUTPATIENT
Start: 2024-08-11 | End: 2024-08-14 | Stop reason: HOSPADM

## 2024-08-11 RX ORDER — TRAZODONE HYDROCHLORIDE 50 MG/1
50 TABLET ORAL NIGHTLY PRN
Status: DISCONTINUED | OUTPATIENT
Start: 2024-08-11 | End: 2024-08-14 | Stop reason: HOSPADM

## 2024-08-11 RX ORDER — DIPHENHYDRAMINE HYDROCHLORIDE 50 MG/ML
50 INJECTION INTRAMUSCULAR; INTRAVENOUS ONCE
Status: COMPLETED | OUTPATIENT
Start: 2024-08-11 | End: 2024-08-11

## 2024-08-11 RX ORDER — HALOPERIDOL 5 MG/ML
5 INJECTION INTRAMUSCULAR ONCE
Status: COMPLETED | OUTPATIENT
Start: 2024-08-11 | End: 2024-08-11

## 2024-08-11 RX ORDER — CLONIDINE HYDROCHLORIDE 0.1 MG/1
0.1 TABLET ORAL DAILY
Status: CANCELLED | OUTPATIENT
Start: 2024-08-11

## 2024-08-11 RX ORDER — BUSPIRONE HYDROCHLORIDE 10 MG/1
10 TABLET ORAL 2 TIMES DAILY
COMMUNITY
End: 2024-08-14 | Stop reason: HOSPADM

## 2024-08-11 RX ADMIN — DIPHENHYDRAMINE HYDROCHLORIDE 50 MG: 50 INJECTION, SOLUTION INTRAMUSCULAR; INTRAVENOUS at 04:29

## 2024-08-11 RX ADMIN — LORAZEPAM 2 MG: 2 INJECTION, SOLUTION INTRAMUSCULAR; INTRAVENOUS at 04:29

## 2024-08-11 RX ADMIN — HALOPERIDOL LACTATE 5 MG: 5 INJECTION, SOLUTION INTRAMUSCULAR at 04:29

## 2024-08-11 RX ADMIN — OLANZAPINE 5 MG: 5 TABLET, FILM COATED ORAL at 21:44

## 2024-08-11 RX ADMIN — BUSPIRONE HYDROCHLORIDE 10 MG: 10 TABLET ORAL at 21:44

## 2024-08-11 NOTE — NURSING NOTE
Pt has a speech impairment that makes communication very difficult. I ask pt is we could stick to yes or no answers but pt was not able to do this. I could make out pt saying that he had a knife to his throat today and he seen the blue lights coming. He also stated something about maybe taking some pills. Per triage nurse pt stated he was broken and needed help. Pt rates both depression and anxiety a 10.

## 2024-08-11 NOTE — NURSING NOTE
Pt agitated @0420, yelling and throwing his cereal. Pt told by staff to clean it up and pt refused, cursing at staff. Pt's roommate moved to another room and new orders for B52 and private room placed. This nurse asked pt why he was upset, and he said, “The world.” Attempted to discuss appropriate behavior with pt; pt unable to be redirected, continuing to yell but attempted to clean up cereal mess. Security called for walkthrough, and they stood by while B52 given; pt tolerated without issue.
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
## 2024-08-11 NOTE — NURSING NOTE
"Pt is becoming increasingly agitated. Pt is cussing and screaming in his room. Pt threw cereal and milk and said he \"is not fucking cleaning that shit up\". Pt asked to be quiet because his roommate and others are sleeping, pt became louder. Dr. Stearns notified of pt behavior. New orders as follows:   Benadryl 50mg IM once  Haldol 5mg IM once  Ativan 2mg IM once  Private room   [id number]  "

## 2024-08-11 NOTE — PLAN OF CARE
Problem: Adult Behavioral Health Plan of Care  Goal: Plan of Care Review  Outcome: Ongoing, Progressing  Flowsheets  Taken 8/11/2024 1228  Progress: no change  Outcome Evaluation: PATIENT IS MINIMALLY COOPERATIVE WITH ASSESSMENT. PATIENT RATES ANXIETY AND DEPRESSION AT 10/10. PATIENT, SO FAR, HAS REMAINED IN BED SLEEPING FOR MOST OF THIS SHIFT. NO S/S OF ACUTE DISTRESS NOTED. NEW ORDERS: REMERON 15MG  Taken 8/11/2024 0837  Plan of Care Reviewed With: patient  Patient Agreement with Plan of Care: agrees   Goal Outcome Evaluation:  Plan of Care Reviewed With: patient  Patient Agreement with Plan of Care: agrees     Progress: no change  Outcome Evaluation: PATIENT IS MINIMALLY COOPERATIVE WITH ASSESSMENT. PATIENT RATES ANXIETY AND DEPRESSION AT 10/10. PATIENT, SO FAR, HAS REMAINED IN BED SLEEPING FOR MOST OF THIS SHIFT. NO S/S OF ACUTE DISTRESS NOTED. NEW ORDERS: REMERON 15MG
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
## 2024-08-12 PROCEDURE — 99233 SBSQ HOSP IP/OBS HIGH 50: CPT | Performed by: PSYCHIATRY & NEUROLOGY

## 2024-08-12 RX ORDER — MIRTAZAPINE 15 MG/1
15 TABLET, FILM COATED ORAL NIGHTLY
Status: DISCONTINUED | OUTPATIENT
Start: 2024-08-13 | End: 2024-08-14 | Stop reason: HOSPADM

## 2024-08-12 RX ADMIN — OLANZAPINE 5 MG: 5 TABLET, FILM COATED ORAL at 22:35

## 2024-08-12 RX ADMIN — BUSPIRONE HYDROCHLORIDE 10 MG: 10 TABLET ORAL at 09:40

## 2024-08-12 RX ADMIN — MIRTAZAPINE 15 MG: 15 TABLET, FILM COATED ORAL at 09:40

## 2024-08-12 RX ADMIN — BUSPIRONE HYDROCHLORIDE 10 MG: 10 TABLET ORAL at 22:35

## 2024-08-12 NOTE — PLAN OF CARE
Goal Outcome Evaluation:           Progress: improving  Outcome Evaluation: Therapist met with patient to review care plan, social history, aftercare recommendation, and safety plan; patient agreeable.        Problem: Adult Behavioral Health Plan of Care  Goal: Plan of Care Review  Outcome: Ongoing, Progressing  Flowsheets  Taken 8/12/2024 1502 by Petros London  Progress: improving  Outcome Evaluation:   Therapist met with patient to review care plan, social history, aftercare recommendation, and safety plan   patient agreeable.  Taken 8/12/2024 1436 by Daisy Batista RN  Plan of Care Reviewed With: patient  Patient Agreement with Plan of Care: agrees     Problem: Adult Behavioral Health Plan of Care  Goal: Patient-Specific Goal (Individualization)  Outcome: Ongoing, Progressing  Flowsheets  Taken 8/12/2024 1502 by Petros London  Patient-Specific Goals (Include Timeframe): Identify 2-3 coping skills, complete aftercare plan, complete safety plan, and deny SI/HI prior to discharge.  Individualized Care Needs: Therapist to offer 1-4 therapy sessions, daily groups, family education, aftercare recommendation, safety planning, and brief CBT/MI interventions during hospitalization.  Taken 8/12/2024 1029 by Petros London  Patient Personal Strengths:   family/social support   resilient  Patient Vulnerabilities: limited social skills  Taken 8/11/2024 0332 by Lizett Epps RN  Anxieties, Fears or Concerns: none verbalized     Problem: Adult Behavioral Health Plan of Care  Goal: Optimized Coping Skills in Response to Life Stressors  Outcome: Ongoing, Progressing  Flowsheets (Taken 8/12/2024 1508)  Optimized Coping Skills in Response to Life Stressors: making progress toward outcome  Intervention: Promote Effective Coping Strategies  Flowsheets (Taken 8/12/2024 1508)  Supportive Measures:   active listening utilized   counseling provided   self-care encouraged   verbalization of feelings encouraged     Problem: Adult  Behavioral Health Plan of Care  Goal: Develops/Participates in Therapeutic [place] to Support Successful Transition  Outcome: Ongoing, Progressing  Flowsheets (Taken 8/12/2024 1508)  Develops/Participates in Therapeutic [place] to Support Successful Transition: making progress toward outcome  Intervention: Foster Therapeutic [place]  Flowsheets (Taken 8/12/2024 1508)  Trust Relationship/Rapport:   care explained   choices provided   empathic listening provided   questions answered   questions encouraged   thoughts/feelings acknowledged   emotional support provided   reassurance provided  Intervention: Mutually Develop Transition Plan  Flowsheets  Taken 8/12/2024 1508 by Petros London  Discharge Coordination/Progress:   Therapist met with patient to complete discharge needs assessment   patient considering outpatient services. Patient has transportation.  Anticipated Discharge Disposition: home with family  Transportation Concerns: no car  Current Discharge Risk: psychiatric illness  Concerns to be Addressed: mental health  Readmission Within the Last 30 Days: no previous admission in last 30 days  Taken 8/11/2024 0332 by Lizett Epps RN  Transportation Anticipated: family or friend will provide  Patient/Family Anticipated Services at Transition: none  Patient/Family Anticipates Transition to: home with family    DATA:  Therapist met individually with Patient this date for initial evaluation.  Introduced self as Therapist and the role of a positive therapeutic relationship; Patient agreeable.      Therapist encouraged Patient to speak openly and honestly about any issues or stressors during treatment stay. Therapist explained how open communication is significant to providing most effective care.      Therapist completed psychosocial assessment, integrated summary, reviewed care plans, disposition planning and discussed hospitalization expectations and treatment goals this date.     Therapist is recommending  family involvement prior to discharge and it's importance. Patient agreeable, awaiting consent from state guardian.     State guardian has been contacted via local Guardianship office. Number listed is for calls to the office after hours. Awaiting consent from state guardian at this time.     CLINICAL MANUVERING/INTERVENTIONS:  Assisted Patient in processing session content; acknowledged and normalized Patient’s thoughts, feelings, and concerns by utilizing a person-centered approach in efforts to build appropriate rapport and a positive therapeutic relationship with open and honest communication. Allowed Patient to ventilate regarding current stressors and triggers for negative emotions and thoughts in a safe nonjudgmental environment with unconditional positive regard, active listening skills, and empathy.     ASSESSMENT: Patient is a 39 year old male who presented to the ED with SI. Patient was seen by therapist 1:1 bedside on this date. Patient reports that he is feeling better than when he came in. Patient reports a decline in depression and anxiety and reports that they are both very low on a scale of 1-10, but was unable to provide a true number. Patient struggled to communicate with therapist due to speak impediment and became frustrated with this struggle. Patient denies SI/HI/AVH at this time.       PLAN:   Patient will receive 24/7 nursing monitoring and daily psychiatrist evaluation by a multidisciplinary team.    Patient will continue stabilization at this time.     Public assistance with transportation will not be needed. Family member will provide.

## 2024-08-12 NOTE — PLAN OF CARE
Goal Outcome Evaluation:  Plan of Care Reviewed With: patient  Patient Agreement with Plan of Care: agrees     Progress: no change  Outcome Evaluation: Patient isolated to room most of shift and refused to answer any asessment questions. Patient slept majority of shift.

## 2024-08-12 NOTE — PLAN OF CARE
Goal Outcome Evaluation:  Plan of Care Reviewed With: patient  Patient Agreement with Plan of Care: agrees     Progress: no change  Outcome Evaluation: Pt is minimally cooperative and unable to assess. Pt has stayed in room and avoided social contact. Pt is easily irritable.

## 2024-08-12 NOTE — PROGRESS NOTES
"INPATIENT PSYCHIATRIC PROGRESS NOTE    Name:  Cruz Mims  :  1985  MRN:  [id number]  Visit Number:  [id number]  Length of stay:  1    SUBJECTIVE    CC/Focus of Exam: Agitation, SI    INTERVAL HISTORY:  Patient minimally cooperative thus far, largely isolating.  When he does awake or emerge, he is somewhat disorganized and a poor historian.  He did report home medications had not been restarted, specifically Wellbutrin and clonidine.  Patient denied recent substance use other than cannabis, despite methamphetamine found on UDS.    Depression rating 5/10  Anxiety rating 6/10  Sleep: Plenty  Withdrawal sx: Denied  Cravin/10    Review of Systems   Constitutional: Negative.    Respiratory: Negative.     Cardiovascular: Negative.    Gastrointestinal: Negative.    Musculoskeletal: Negative.    Psychiatric/Behavioral:  Positive for behavioral problems and dysphoric mood. The patient is nervous/anxious.        OBJECTIVE    Temp:  [97.5 °F (36.4 °C)-98 °F (36.7 °C)] 98 °F (36.7 °C)  Heart Rate:  [62-72] 62  Resp:  [16-18] 18  BP: (91-94)/(54-60) 91/54    MENTAL STATUS EXAM:  Appearance: Casually dressed, fair hygeine.   Cooperation: Guarded  Psychomotor: No psychomotor agitation/retardation, No EPS, No motor tics  Speech: Decreased rate, amount.  Mood: \"Not sure\"   Affect: congruent, restricted, intermittently irritable  Thought Content: Hospital delusional material present  Thought process: Disorganized  Suicidality: Intermittent SI  Homicidality: No HI  Perception: No AH/VH  Insight: Poor  Judgment: Poor    Lab Results (last 24 hours)       ** No results found for the last 24 hours. **               Imaging Results (Last 24 Hours)       ** No results found for the last 24 hours. **               ECG/EMG Results (most recent)       None             ALLERGIES: Amoxicillin, Penicillins, and Venlafaxine      Current Facility-Administered Medications:     acetaminophen (TYLENOL) tablet 650 mg, 650 mg, Oral, " Q6H PRN, Antwan Stearns MD    aluminum-magnesium hydroxide-simethicone (MAALOX MAX) 400-400-40 MG/5ML suspension 15 mL, 15 mL, Oral, Q6H PRN, Antwan Stearns MD    benzonatate (TESSALON) capsule 100 mg, 100 mg, Oral, TID PRN, Antwan Stearns MD    benztropine (COGENTIN) tablet 2 mg, 2 mg, Oral, Once PRN **OR** benztropine (COGENTIN) injection 1 mg, 1 mg, Intramuscular, Once PRN, Antwan Stearns MD    busPIRone (BUSPAR) tablet 10 mg, 10 mg, Oral, BID, Antwan Stearns MD, 10 mg at 08/12/24 0940    famotidine (PEPCID) tablet 20 mg, 20 mg, Oral, BID PRN, Antwan Stearns MD    hydrOXYzine (ATARAX) tablet 50 mg, 50 mg, Oral, Q6H PRN, Antwan Stearns MD    ibuprofen (ADVIL,MOTRIN) tablet 400 mg, 400 mg, Oral, Q6H PRN, Antwan Stearns MD    loperamide (IMODIUM) capsule 2 mg, 2 mg, Oral, Q2H PRN, Antwan Stearns MD    magnesium hydroxide (MILK OF MAGNESIA) suspension 10 mL, 10 mL, Oral, Daily PRN, Antwan Stearns MD    mirtazapine (REMERON) tablet 15 mg, 15 mg, Oral, Daily, Antwan Stearns MD, 15 mg at 08/12/24 0940    OLANZapine (zyPREXA) tablet 5 mg, 5 mg, Oral, Nightly, Antwan Stearns MD, 5 mg at 08/11/24 2144    ondansetron ODT (ZOFRAN-ODT) disintegrating tablet 4 mg, 4 mg, Translingual, Q6H PRN, Antwan Stearns MD    polyethylene glycol (MIRALAX) packet 17 g, 17 g, Oral, Daily PRN, Antwan Stearns MD    sodium chloride nasal spray 2 spray, 2 spray, Each Nare, PRN, Antwan Stearns MD    traZODone (DESYREL) tablet 50 mg, 50 mg, Oral, Nightly PRN, Antwan Stearns MD    Reviewed chart, notes, vitals, labs and EKG personally reviewed.    ASSESSMENT & PLAN:    Suicidal Ideation  -SI with plan  -Admit for crisis stabilization  -SP3     Unspecified psychosis  -Rule out organic illness, substance induced psychosis, mood disorder  -Resumed olanzapine 5 mg nightly on 1124  -Resumed mirtazapine 15 mg nightly on 8/11/2024  -Patient also prescribed Wellbutrin and clonidine, which were held on admission given current  presentation  -We will establish outpatient psychiatric care following hospitalization     Unspecified anxiety disorder  -Medication as above  -Continue buspirone 10 mg twice daily  -We will establish outpatient psychiatric care following hospitalization     Methamphetamine use disorder, severe, dependence  -Admission UDS positive.  Patient denies recent use  -Supportive care  -We will refer for substance abuse treatment following hospitalization     Borderline intellectual disability  - Supportive treatment     Cannabis use disorder, severe, dependence  -Admission UDS positive  -Supportive care  -Ascertain substance abuse treatment plans following discharge     Nicotine use disorder, severe, dependence  -21 mg daily patch ordered  -Encouraged cessation     Hepatitis C  -Antibody positive  -Transaminases within normal limits     Hospital bed: No     The patient has been admitted for safety and stabilization.  Patient will be monitored for suicidality daily and maintained on Special Precautions Level 3 (q15 min checks) .  The patient will have individual and group therapy with a master's level therapist. A master treatment plan will be developed and agreed upon by the patient and his/her treatment team.  The patient's estimated length of stay in the hospital is 4-6 days.       Special precautions: Special Precautions Level 3 (q15 min checks)     Behavioral Health Treatment Plan and Problem List: I have reviewed and approved the Behavioral Health Treatment Plan and Problem list.  The patient has had a chance to review and agrees with the treatment plan.    I have reviewed the copied text and it is accurate as of 08/12/24     Clinician:  Antwan Stearns MD  08/12/24  13:23 EDT

## 2024-08-13 PROBLEM — F23 BRIEF PSYCHOTIC DISORDER: Status: ACTIVE | Noted: 2024-08-13

## 2024-08-13 PROCEDURE — 99232 SBSQ HOSP IP/OBS MODERATE 35: CPT | Performed by: PSYCHIATRY & NEUROLOGY

## 2024-08-13 RX ADMIN — OLANZAPINE 5 MG: 5 TABLET, FILM COATED ORAL at 21:28

## 2024-08-13 RX ADMIN — BUSPIRONE HYDROCHLORIDE 10 MG: 10 TABLET ORAL at 21:28

## 2024-08-13 RX ADMIN — MIRTAZAPINE 15 MG: 15 TABLET, FILM COATED ORAL at 21:28

## 2024-08-13 RX ADMIN — BUSPIRONE HYDROCHLORIDE 10 MG: 10 TABLET ORAL at 08:51

## 2024-08-13 NOTE — DISCHARGE INSTR - APPOINTMENTS
[facility]  [address] [place], [place] [id number]  [phone]    Friday, August 16 2024 at 9:00am.

## 2024-08-13 NOTE — PLAN OF CARE
Goal Outcome Evaluation:  Plan of Care Reviewed With: patient  Patient Agreement with Plan of Care: agrees     Progress: improving  Outcome Evaluation: Patient was cooperative with staff during assessment. Patient denied any anxiety or depression, as well as SI/HI/AVH. Pt got out of room a little more today and showered. Pt had no complaints this shift.

## 2024-08-13 NOTE — PLAN OF CARE
Goal Outcome Evaluation:    Problem: Adult Behavioral Health Plan of Care  Goal: Patient-Specific Goal (Individualization)  Outcome: Ongoing, Progressing  Flowsheets  Taken 8/12/2024 1502 by Petros London  Patient-Specific Goals (Include Timeframe): Identify 2-3 coping skills, complete aftercare plan, complete safety plan, and deny SI/HI prior to discharge.  Individualized Care Needs: Therapist to offer 1-4 therapy sessions, daily groups, family education, aftercare recommendation, safety planning, and brief CBT/MI interventions during hospitalization.  Taken 8/12/2024 1029 by Petros London  Patient Personal Strengths:   family/social support   resilient  Patient Vulnerabilities: limited social skills  Taken 8/11/2024 0332 by Lizett Epps RN  Anxieties, Fears or Concerns: none verbalized     Problem: Adult Behavioral Health Plan of Care  Goal: Optimized Coping Skills in Response to Life Stressors  Outcome: Ongoing, Progressing  Flowsheets (Taken 8/12/2024 1508)  Optimized Coping Skills in Response to Life Stressors: making progress toward outcome  Intervention: Promote Effective Coping Strategies  Flowsheets (Taken 8/13/2024 1333)  Supportive Measures:   active listening utilized   verbalization of feelings encouraged   positive reinforcement provided   counseling provided     Problem: Adult Behavioral Health Plan of Care  Goal: Develops/Participates in Therapeutic [place] to Support Successful Transition  Outcome: Ongoing, Progressing  Flowsheets (Taken 8/12/2024 1508)  Develops/Participates in Therapeutic [place] to Support Successful Transition: making progress toward outcome  Intervention: Foster Therapeutic [place]  Flowsheets (Taken 8/13/2024 1333)  Trust Relationship/Rapport:   care explained   choices provided   emotional support provided   empathic listening provided   questions answered   questions encouraged   thoughts/feelings acknowledged   reassurance provided  Intervention: Mutually Develop  Transition Plan  Flowsheets  Taken 8/13/2024 1333 by Petros London  Transition Support:   follow-up care discussed   community resources reviewed  Taken 8/12/2024 1526 by Petros London  Readmission Within the Last 30 Days: no previous admission in last 30 days  Taken 8/12/2024 1508 by Petros London  Discharge Coordination/Progress:   Therapist met with patient to complete discharge needs assessment   patient considering outpatient services. Patient has transportation.  Anticipated Discharge Disposition: home with family  Transportation Concerns: no car  Current Discharge Risk: psychiatric illness  Concerns to be Addressed: mental health  Taken 8/11/2024 0332 by Lizett Epps RN  Transportation Anticipated: family or friend will provide  Patient/Family Anticipated Services at Transition: none  Patient/Family Anticipates Transition to: home with family    DATA:Therapist met with Patient individually this date. Patient agreeable to discuss current treatment progress and discharge concerns.     Therapist contacted State Guardier[employer] Bourne at number [phone] through the [facility] Section of [employer]. Guardian provided verbal consent for return home to Aric Gill's care([phone]).     Guardian provided consent for aftercare with [facility] in [place].     Therapist made an attempt to call  to complete safety and discharge planning, but the call was unanswered. Message left for him to call back.     CLINICAL MANUVERING/INTERVENTIONS:  Assisted Patient in processing session content; acknowledged and normalized Patient’s thoughts, feelings, and concerns by utilizing a person-centered approach in efforts to build appropriate rapport and a positive therapeutic relationship with open and honest communication. Allowed Patient to ventilate regarding current stressors and triggers for negative emotions and thoughts in a safe nonjudgmental environment with unconditional positive regard, active  listening skills, and empathy.      ASSESSMENT: Patient is a 39 year old male who presented to the ED with SI. Patient states that he is feeling much better. Patient is eager to return home where he claims he is staying with his brother, contrary to prior reports. Patient confirms that he has a state guardian, but cannot remember her name.  Patient is agreeable to care and cooperative. Patient rates that his depression is at a 0/10 and his anxiety at a 1/10. Patient denies SI/HI/AVH.       PLAN:   Patient will continue stabilization. Patient will continue to receive services offered by Treatment Team.     Patient will follow up with [facility] in [place].     Transportation needs unclear.

## 2024-08-13 NOTE — PLAN OF CARE
Goal Outcome Evaluation:  Plan of Care Reviewed With: patient  Patient Agreement with Plan of Care: agrees     Progress: no change  Outcome Evaluation: Pt refused assessment. Pt stays in his room and avoids social contact.

## 2024-08-13 NOTE — PROGRESS NOTES
"INPATIENT PSYCHIATRIC PROGRESS NOTE    Name:  Cruz Mims  :  1985  MRN:  [id number]  Visit Number:  [id number]  Length of stay:  2    Behavioral Health Treatment Plan and Problem List: I have reviewed and approved the Behavioral Health Treatment Plan and Problem list.    SUBJECTIVE    CC/ Focus of exam:  psychosis/ depression    Patient's subjective status: \"I'm doing ok\"    INTERVAL HISTORY: Chart reviewed and patient interviewed.  This morning he presents calmly clear of any apparent psychotic thought content and affect appropriate.  He certainly minimizes substance abuse as an issue, specifically methamphetamine.  He states he has been hospitalized once in [place] since his last hospitalization here in , states he has been living with his brother in [place] for the past 4 to 5 months.  Is under state guardianship who has  approved this.  He states that the household is composed of his father's  (father  3 to 4 months PTA), her 3-year-old daughter, his brother and his girlfriend.  He describes the home situation is peaceful and drug free.  He states he has been in contact with the [facility] clinic there in [place] and perhaps has plans to participate in their day hospital program although is not sure.  As with his prior admissions his presenting symptoms have cleared rapidly and after checking with his home situation and guardian he should be able to be discharged safely within the next day or 2.    Depression rating says not  Anxiety rating says not  Hopelessness: Says not  Sleep: 9 hours  Withdrawal sx: None  Craving: Says not/10       ROS: No cardiovascular, GI, or Neurological complaints.       OBJECTIVE    Vitals:    24 0743   BP: 94/57   Pulse: 55   Resp: 18   Temp: 98.6 °F (37 °C)   SpO2: 97%      Temp:  [97 °F (36.1 °C)-98.8 °F (37.1 °C)] 98.6 °F (37 °C)  Heart Rate:  [] 55  Resp:  [18] 18  BP: ()/(56-75) 94/57    MENTAL STATUS EXAM:       Psychomotor: No " psychomotor agitation/retardation, No EPS, No motor tics  Speech-normal rate, amount.  Mood/Affect: Appropriate  Thought Processes: coherent  Thought Content: normal  Hallucination(s): none  Hopelessness: No  Optimistic: minimally  Suicidal Thoughts: No  Suicidal Plan/Intent: No  Homicidal Thoughts: absent  Orientation: oriented x 3  Memory: recent intact    Lab Results (last 24 hours)       ** No results found for the last 24 hours. **             Imaging Results (Last 24 Hours)       ** No results found for the last 24 hours. **             Lab and x-ray studies reviewed.    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     Methamphetamine use disorder, severe, dependence  -Admission UDS positive.  Patient denies recent use  -Supportive care  -We will refer for substance abuse treatment following hospitalization     Borderline intellectual disability  - Supportive treatment     Cannabis use disorder, severe, dependence  -Admission UDS positive  -Supportive care  -Ascertain substance abuse treatment plans following discharge     Nicotine use disorder, severe, dependence  -21 mg daily patch ordered  -Encouraged cessation     Hepatitis C  -Antibody positive  -Transaminases within normal limits      Special precautions: Special Precautions Level 3 (q15 min checks)     Behavioral Health Treatment Plan and Problem  List: I have reviewed and approved the Behavioral Health Treatment Plan and Problem list.    I spent a total of 26 minutes in direct patient care including  17 minutes face to face with the patient assessment, coordination of care, and counseling the patient on the current and follow-up treatment plans regarding his status and situation.  Patient had no additional questions.     SHERLYN Lozada MD    Clinician:  Carlton Lozada MD  08/13/24  10:04 EDT    Dictated utilizing Dragon dictation

## 2024-08-14 VITALS
WEIGHT: 128.8 LBS | SYSTOLIC BLOOD PRESSURE: 115 MMHG | HEIGHT: 68 IN | HEART RATE: 73 BPM | BODY MASS INDEX: 19.52 KG/M2 | DIASTOLIC BLOOD PRESSURE: 57 MMHG | RESPIRATION RATE: 17 BRPM | OXYGEN SATURATION: 97 % | TEMPERATURE: 97.5 F

## 2024-08-14 PROCEDURE — 99239 HOSP IP/OBS DSCHRG MGMT >30: CPT | Performed by: PSYCHIATRY & NEUROLOGY

## 2024-08-14 RX ORDER — CLONIDINE HYDROCHLORIDE 0.1 MG/1
0.1 TABLET ORAL DAILY
Qty: 30 TABLET | Refills: 0 | Status: SHIPPED | OUTPATIENT
Start: 2024-08-14

## 2024-08-14 RX ORDER — BUPROPION HYDROCHLORIDE 150 MG/1
300 TABLET ORAL DAILY
Qty: 30 TABLET | Refills: 0 | Status: SHIPPED | OUTPATIENT
Start: 2024-08-14

## 2024-08-14 RX ORDER — OLANZAPINE 5 MG/1
5 TABLET ORAL NIGHTLY
Qty: 30 TABLET | Refills: 0 | Status: SHIPPED | OUTPATIENT
Start: 2024-08-14

## 2024-08-14 RX ADMIN — BUSPIRONE HYDROCHLORIDE 10 MG: 10 TABLET ORAL at 10:32

## 2024-08-14 NOTE — DISCHARGE PLACEMENT REQUEST
"Crzu Monteiro (39 y.o. Male)       Date of Birth   1985    Social Security Number       Address   [address] [place] [address] [id number]    Home Phone   [phone]    MRN   [id number]       [employer]   None    Marital Status   Single                            Admission Date   8/11/24    Admission Type   Emergency    Admitting Provider   Antwan Stearns MD    Attending Provider   Carlton Lozada MD    Department, Room/Bed   [facility] ADULT PSYCHIATRIC, 1017/1S       Discharge Date       Discharge Disposition   Home or Self Care    Discharge Destination                                 Attending Provider: Carlton Lozada MD    Allergies: Amoxicillin, Penicillins, Venlafaxine    Isolation: None   Infection: None   Code Status: CPR    Ht: 172.7 cm (68\")   Wt: 58.4 kg (128 lb 12.8 oz)    Admission Cmt: None   Principal Problem: Suicidal behavior [R45.89]                   Active Insurance as of 8/11/2024       Primary Coverage       Payor Plan Insurance Group Employer/Plan Group    MEDICARE MEDICARE A & B        Payor Plan Address Payor Plan Phone Number Payor Plan Fax Number Effective Dates    [address] [phone]  11/1/2013 - None Entered    [facility] [id number]         Subscriber Name Subscriber Birth Date Member ID       CRUZ MONTEIRO 1985 [id number]               Secondary Coverage       Payor Plan Insurance Group Employer/Plan Group    [place] [id number]       Payor Plan Address Payor Plan Phone Number Payor Plan Fax Number Effective Dates    [employer] [address] [address] [phone]  1/1/2020 - None Entered    [facility] [id number]         Subscriber Name Subscriber Birth Date Member ID       CRUZ MONTEIRO 1985 [id number]                     Emergency Contacts        (Rel.) Home Phone Work Phone Mobile Phone    STATE,GUARDIAN (Legal Guardian) [phone] -- --    Aric Monteiro (Father) " [phone] -- --                 Discharge Summaryl        Manisha Quiroga, RN at 08/14/24 0944          Resume regular/home diet.    Electronically signed by Manisha Quiroga RN at 08/14/24 0944       Manisha Quiroga, RN at 08/14/24 0944          Activity as tolerated.    Electronically signed by Manisha Quiroga RN at 08/14/24 0944       Lizett Epps RN at 08/14/24 0618          Pt slept approximately 7 hours this shift.     Electronically signed by Lizett Epps RN at 08/14/24 0618       Lizett Epps RN at 08/14/24 0251          Goal Outcome Evaluation:  Plan of Care Reviewed With: patient  Patient Agreement with Plan of Care: agrees     Progress: improving  Outcome Evaluation: Pt denies anxiety, depression, SI/HI/AVH. Pt reports good sleep and appetite.                                 Electronically signed by Lizett Epps RN at 08/14/24 0251       Daisy Batista RN at 08/13/24 1635          Goal Outcome Evaluation:  Plan of Care Reviewed With: patient  Patient Agreement with Plan of Care: agrees     Progress: improving  Outcome Evaluation: Patient was cooperative with staff during assessment. Patient denied any anxiety or depression, as well as SI/HI/AVH. Pt got out of room a little more today and showered. Pt had no complaints this shift.                                 Electronically signed by Daisy Batista RN at 08/13/24 1635       Martha Rubin at 08/13/24 1503          [facility] [address], [place] [address]  [phone]    Friday, August 16 2024 at 9:00am.    Electronically signed by Martha Rubin at 08/13/24 1503       Petros London at 08/13/24 1335           Goal Outcome Evaluation:    Problem: Adult Behavioral Health Plan of Care  Goal: Patient-Specific Goal (Individualization)  Outcome: Ongoing, Progressing  Flowsheets  Taken 8/12/2024 1502 by Petros London  Patient-Specific Goals (Include Timeframe): Identify 2-3  coping skills, complete aftercare plan, complete safety plan, and deny SI/HI prior to discharge.  Individualized Care Needs: Therapist to offer 1-4 therapy sessions, daily groups, family education, aftercare recommendation, safety planning, and brief CBT/MI interventions during hospitalization.  Taken 8/12/2024 1029 by Petros London  Patient Personal Strengths:   family/social support   resilient  Patient Vulnerabilities: limited social skills  Taken 8/11/2024 0332 by Lizett Epps RN  Anxieties, Fears or Concerns: none verbalized     Problem: Adult Behavioral Health Plan of Care  Goal: Optimized Coping Skills in Response to Life Stressors  Outcome: Ongoing, Progressing  Flowsheets (Taken 8/12/2024 1508)  Optimized Coping Skills in Response to Life Stressors: making progress toward outcome  Intervention: Promote Effective Coping Strategies  Flowsheets (Taken 8/13/2024 1333)  Supportive Measures:   active listening utilized   verbalization of feelings encouraged   positive reinforcement provided   counseling provided     Problem: Adult Behavioral Health Plan of Care  Goal: Develops/Participates in Therapeutic [place] to Support Successful Transition  Outcome: Ongoing, Progressing  Flowsheets (Taken 8/12/2024 1508)  Develops/Participates in Therapeutic [place] to Support Successful Transition: making progress toward outcome  Intervention: Foster Therapeutic [place]  Flowsheets (Taken 8/13/2024 1333)  Trust Relationship/Rapport:   care explained   choices provided   emotional support provided   empathic listening provided   questions answered   questions encouraged   thoughts/feelings acknowledged   reassurance provided  Intervention: Mutually Develop Transition Plan  Flowsheets  Taken 8/13/2024 1333 by Petros London  Transition Support:   follow-up care discussed   community resources reviewed  Taken 8/12/2024 1526 by Petros London  Readmission Within the Last 30 Days: no previous admission in last 30  days  Taken 8/12/2024 1508 by Petros London  Discharge Coordination/Progress:   Therapist met with patient to complete discharge needs assessment   patient considering outpatient services. Patient has transportation.  Anticipated Discharge Disposition: home with family  Transportation Concerns: no car  Current Discharge Risk: psychiatric illness  Concerns to be Addressed: mental health  Taken 8/11/2024 0332 by Lizett Epps RN  Transportation Anticipated: family or friend will provide  Patient/Family Anticipated Services at Transition: none  Patient/Family Anticipates Transition to: home with family    DATA:Therapist met with Patient individually this date. Patient agreeable to discuss current treatment progress and discharge concerns.     Therapist contacted State Guardian Vicki Bourne at number [phone] through the [facility] Section of [employer]. Guardian provided verbal consent for return home to Bro[facility]'s care([phone]).     Guardian provided consent for aftercare with [facility] in [place].     Therapist made an attempt to call brothrylee to complete safety and discharge planning, but the call was unanswered. Message left for him to call back.     CLINICAL MANUVERING/INTERVENTIONS:  Assisted Patient in processing session content; acknowledged and normalized Patient’s thoughts, feelings, and concerns by utilizing a person-centered approach in efforts to build appropriate rapport and a positive therapeutic relationship with open and honest communication. Allowed Patient to ventilate regarding current stressors and triggers for negative emotions and thoughts in a safe nonjudgmental environment with unconditional positive regard, active listening skills, and empathy.      ASSESSMENT: Patient is a 39 year old male who presented to the ED with SI. Patient states that he is feeling much better. Patient is eager to return home where he claims he is staying with his brother, contrary to prior  "reports. Patient confirms that he has a state guardian, but cannot remember her name.  Patient is agreeable to care and cooperative. Patient rates that his depression is at a 0/10 and his anxiety at a 1/10. Patient denies SI/HI/AVH.       PLAN:   Patient will continue stabilization. Patient will continue to receive services offered by Treatment Team.     Patient will follow up with [facility] in [place].     Transportation needs unclear.     Electronically signed by Petros London at 24 1450       Daisy Batista RN at 24 1328          Goal Outcome Evaluation:  Plan of Care Reviewed With: patient  Patient Agreement with Plan of Care: agrees     Progress: improving  Outcome Evaluation: Patient was cooperative with staff during assessment. Patient denied any anxiety or depression, as well as SI/HI/AVH. Pt got out of room a little more today and showered. Pt had no complaints this shift.                                 Electronically signed by Daisy Batista RN at 24 1329       Carlton Lozada MD at 24 1004          INPATIENT PSYCHIATRIC PROGRESS NOTE    Name:  Cruz Mims  :  1985  MRN:  [id number]  Visit Number:  [id number]  Length of stay:  2    Behavioral Health Treatment Plan and Problem List: I have reviewed and approved the Behavioral Health Treatment Plan and Problem list.    SUBJECTIVE    CC/ Focus of exam:  psychosis/ depression    Patient's subjective status: \"I'm doing ok\"    INTERVAL HISTORY: Chart reviewed and patient interviewed.  This morning he presents calmly clear of any apparent psychotic thought content and affect appropriate.  He certainly minimizes substance abuse as an issue, specifically methamphetamine.  He states he has been hospitalized once in [place] since his last hospitalization here in , states he has been living with his brother in [place] for the past 4 to 5 months.  Is under state guardianship who has  approved this.  He " states that the household is composed of his father's  (father  3 to 4 months PTA), her 3-year-old daughter, his brother and his girlfriend.  He describes the home situation is peaceful and drug free.  He states he has been in contact with the [facility] clinic there in [place] and perhaps has plans to participate in their day hospital program although is not sure.  As with his prior admissions his presenting symptoms have cleared rapidly and after checking with his home situation and guardian he should be able to be discharged safely within the next day or 2.    Depression rating says not  Anxiety rating says not  Hopelessness: Says not  Sleep: 9 hours  Withdrawal sx: None  Craving: Says not/10       ROS: No cardiovascular, GI, or Neurological complaints.       OBJECTIVE    Vitals:    24 0743   BP: 94/57   Pulse: 55   Resp: 18   Temp: 98.6 °F (37 °C)   SpO2: 97%      Temp:  [97 °F (36.1 °C)-98.8 °F (37.1 °C)] 98.6 °F (37 °C)  Heart Rate:  [] 55  Resp:  [18] 18  BP: ()/(56-75) 94/57    MENTAL STATUS EXAM:       Psychomotor: No psychomotor agitation/retardation, No EPS, No motor tics  Speech-normal rate, amount.  Mood/Affect: Appropriate  Thought Processes: coherent  Thought Content: normal  Hallucination(s): none  Hopelessness: No  Optimistic: minimally  Suicidal Thoughts: No  Suicidal Plan/Intent: No  Homicidal Thoughts: absent  Orientation: oriented x 3  Memory: recent intact    Lab Results (last 24 hours)       ** No results found for the last 24 hours. **             Imaging Results (Last 24 Hours)       ** No results found for the last 24 hours. **             Lab and x-ray studies reviewed.    ECG/EMG Results (most recent)       None             ALLERGIES: Amoxicillin, Penicillins, and Venlafaxine    Medications:     busPIRone, 10 mg, Oral, BID  mirtazapine, 15 mg, Oral, Nightly  OLANZapine, 5 mg, Oral, Nightly       ASSESSMENT & PLAN      Unspecified psychosis  -substance induced psychosis  most likely  -Resumed olanzapine 5 mg nightly on 1124  -Resumed mirtazapine 15 mg nightly on 8/11/2024  -Patient also prescribed Wellbutrin and clonidine, which were held on admission given current presentation  -We will establish outpatient psychiatric care following hospitalization      ADHD  Patient prescribed bupropion and clonidine as an outpatient .     Methamphetamine use disorder, severe, dependence  -Admission UDS positive.  Patient denies recent use  -Supportive care  -We will refer for substance abuse treatment following hospitalization     Borderline intellectual disability  - Supportive treatment     Cannabis use disorder, severe, dependence  -Admission UDS positive  -Supportive care  -Ascertain substance abuse treatment plans following discharge     Nicotine use disorder, severe, dependence  -21 mg daily patch ordered  -Encouraged cessation     Hepatitis C  -Antibody positive  -Transaminases within normal limits      Special precautions: Special Precautions Level 3 (q15 min checks)     Behavioral Health Treatment Plan and Problem List: I have reviewed and approved the Behavioral Health Treatment Plan and Problem list.    I spent a total of 26 minutes in direct patient care including  17 minutes face to face with the patient assessment, coordination of care, and counseling the patient on the current and follow-up treatment plans regarding his status and situation.  Patient had no additional questions.     SHERLYN Lozada MD    Clinician:  Carlton Lozada MD  08/13/24  10:04 EDT    Dictated utilizing Dragon dictation       Electronically signed by Carlton Lozada MD at 08/13/24 1037       Lizett Epps RN at 08/13/24 0609          Pt slept approximately 9 hours this shift.     Electronically signed by Lizett Epps, RN at 08/13/24 0609       Lizett Epps, RN at 08/13/24 0102          Goal Outcome Evaluation:  Plan of Care Reviewed With: patient  Patient Agreement with Plan  of Care: agrees     Progress: no change  Outcome Evaluation: Pt refused assessment. Pt stays in his room and avoids social contact.                                 Electronically signed by Lizett Epps RN at 08/13/24 0102       Petros London at 08/12/24 1508          Goal Outcome Evaluation:           Progress: improving  Outcome Evaluation: Therapist met with patient to review care plan, social history, aftercare recommendation, and safety plan; patient agreeable.        Problem: Adult Behavioral Health Plan of Care  Goal: Plan of Care Review  Outcome: Ongoing, Progressing  Flowsheets  Taken 8/12/2024 1502 by Petros London  Progress: improving  Outcome Evaluation:   Therapist met with patient to review care plan, social history, aftercare recommendation, and safety plan   patient agreeable.  Taken 8/12/2024 1436 by Daisy Batista RN  Plan of Care Reviewed With: patient  Patient Agreement with Plan of Care: agrees     Problem: Adult Behavioral Health Plan of Care  Goal: Patient-Specific Goal (Individualization)  Outcome: Ongoing, Progressing  Flowsheets  Taken 8/12/2024 1502 by Petros London  Patient-Specific Goals (Include Timeframe): Identify 2-3 coping skills, complete aftercare plan, complete safety plan, and deny SI/HI prior to discharge.  Individualized Care Needs: Therapist to offer 1-4 therapy sessions, daily groups, family education, aftercare recommendation, safety planning, and brief CBT/MI interventions during hospitalization.  Taken 8/12/2024 1029 by Petros London  Patient Personal Strengths:   family/social support   resilient  Patient Vulnerabilities: limited social skills  Taken 8/11/2024 0332 by Lizett Epps RN  Anxieties, Fears or Concerns: none verbalized     Problem: Adult Behavioral Health Plan of Care  Goal: Optimized Coping Skills in Response to Life Stressors  Outcome: Ongoing, Progressing  Flowsheets (Taken 8/12/2024 1508)  Optimized Coping Skills in Response to Life Stressors:  making progress toward outcome  Intervention: Promote Effective Coping Strategies  Flowsheets (Taken 8/12/2024 1508)  Supportive Measures:   active listening utilized   counseling provided   self-care encouraged   verbalization of feelings encouraged     Problem: Adult Behavioral Health Plan of Care  Goal: Develops/Participates in Therapeutic [place] to Support Successful Transition  Outcome: Ongoing, Progressing  Flowsheets (Taken 8/12/2024 1508)  Develops/Participates in Therapeutic [place] to Support Successful Transition: making progress toward outcome  Intervention: Foster Therapeutic [place]  Flowsheets (Taken 8/12/2024 1508)  Trust Relationship/Rapport:   care explained   choices provided   empathic listening provided   questions answered   questions encouraged   thoughts/feelings acknowledged   emotional support provided   reassurance provided  Intervention: Mutually Develop Transition Plan  Flowsheets  Taken 8/12/2024 1508 by Petros London  Discharge Coordination/Progress:   Therapist met with patient to complete discharge needs assessment   patient considering outpatient services. Patient has transportation.  Anticipated Discharge Disposition: home with family  Transportation Concerns: no car  Current Discharge Risk: psychiatric illness  Concerns to be Addressed: mental health  Readmission Within the Last 30 Days: no previous admission in last 30 days  Taken 8/11/2024 0332 by Lizett Epps RN  Transportation Anticipated: family or friend will provide  Patient/Family Anticipated Services at Transition: none  Patient/Family Anticipates Transition to: home with family    DATA:  Therapist met individually with Patient this date for initial evaluation.  Introduced self as Therapist and the role of a positive therapeutic relationship; Patient agreeable.      Therapist encouraged Patient to speak openly and honestly about any issues or stressors during treatment stay. Therapist explained how open  communication is significant to providing most effective care.      Therapist completed psychosocial assessment, integrated summary, reviewed care plans, disposition planning and discussed hospitalization expectations and treatment goals this date.     Therapist is recommending family involvement prior to discharge and it's importance. Patient agreeable, awaiting consent from state guardian.     State guardian has been contacted via local Guardianship office. Number listed is for calls to the office after hours. Awaiting consent from state guardian at this time.     CLINICAL MANUVERING/INTERVENTIONS:  Assisted Patient in processing session content; acknowledged and normalized Patient’s thoughts, feelings, and concerns by utilizing a person-centered approach in efforts to build appropriate rapport and a positive therapeutic relationship with open and honest communication. Allowed Patient to ventilate regarding current stressors and triggers for negative emotions and thoughts in a safe nonjudgmental environment with unconditional positive regard, active listening skills, and empathy.     ASSESSMENT: Patient is a 39 year old male who presented to the ED with SI. Patient was seen by therapist 1:1 bedside on this date. Patient reports that he is feeling better than when he came in. Patient reports a decline in depression and anxiety and reports that they are both very low on a scale of 1-10, but was unable to provide a true number. Patient struggled to communicate with therapist due to speak impediment and became frustrated with this struggle. Patient denies SI/HI/AVH at this time.       PLAN:   Patient will receive 24/7 nursing monitoring and daily psychiatrist evaluation by a multidisciplinary team.    Patient will continue stabilization at this time.     Public assistance with transportation will not be needed. Family member will provide.          Electronically signed by Petros London at 08/12/24 6979       Lynne  "PATEL Villa at 24 1448          Goal Outcome Evaluation:  Plan of Care Reviewed With: patient  Patient Agreement with Plan of Care: agrees     Progress: no change  Outcome Evaluation: Patient isolated to room most of shift and refused to answer any asessment questions. Patient slept majority of shift.                                 Electronically signed by Daisy Batista RN at 24 1447       Antwan Stearns MD at 24 1323          INPATIENT PSYCHIATRIC PROGRESS NOTE    Name:  Cruz Mims  :  1985  MRN:  [id number]  Visit Number:  [id number]  Length of stay:  1    SUBJECTIVE    CC/Focus of Exam: Agitation, SI    INTERVAL HISTORY:  Patient minimally cooperative thus far, largely isolating.  When he does awake or emerge, he is somewhat disorganized and a poor historian.  He did report home medications had not been restarted, specifically Wellbutrin and clonidine.  Patient denied recent substance use other than cannabis, despite methamphetamine found on UDS.    Depression rating 5/10  Anxiety rating 6/10  Sleep: Plenty  Withdrawal sx: Denied  [place]/10    Review of Systems   Constitutional: Negative.    Respiratory: Negative.     Cardiovascular: Negative.    Gastrointestinal: Negative.    Musculoskeletal: Negative.    Psychiatric/Behavioral:  Positive for behavioral problems and dysphoric mood. The patient is nervous/anxious.        OBJECTIVE    Temp:  [97.5 °F (36.4 °C)-98 °F (36.7 °C)] 98 °F (36.7 °C)  Heart Rate:  [62-72] 62  Resp:  [16-18] 18  BP: (91-94)/(54-60) 91/54    MENTAL STATUS EXAM:  Appearance: Casually dressed, fair hygeine.   Cooperation: Guarded  Psychomotor: No psychomotor agitation/retardation, No EPS, No motor tics  Speech: Decreased rate, amount.  Mood: \"Not sure\"   Affect: congruent, restricted, intermittently irritable  Thought Content: Hospital delusional material present  Thought process: Disorganized  Suicidality: Intermittent SI  Homicidality: No " HI  Perception: No AH/VH  Insight: Poor  Judgment: Poor    Lab Results (last 24 hours)       ** No results found for the last 24 hours. **               Imaging Results (Last 24 Hours)       ** No results found for the last 24 hours. **               ECG/EMG Results (most recent)       None             ALLERGIES: Amoxicillin, Penicillins, and Venlafaxine      Current Facility-Administered Medications:     acetaminophen (TYLENOL) tablet 650 mg, 650 mg, Oral, Q6H PRN, Antwan Stearns MD    aluminum-magnesium hydroxide-simethicone (MAALOX MAX) 400-400-40 MG/5ML suspension 15 mL, 15 mL, Oral, Q6H PRN, Antwan Stearns MD    benzonatate (TESSALON) capsule 100 mg, 100 mg, Oral, TID PRN, Antwan Stearns MD    benztropine (COGENTIN) tablet 2 mg, 2 mg, Oral, Once PRN **OR** benztropine (COGENTIN) injection 1 mg, 1 mg, Intramuscular, Once PRN, Antwan Stearns MD    busPIRone (BUSPAR) tablet 10 mg, 10 mg, Oral, BID, Antwan Stearns MD, 10 mg at 08/12/24 0940    famotidine (PEPCID) tablet 20 mg, 20 mg, Oral, BID PRN, Antwan Stearns MD    hydrOXYzine (ATARAX) tablet 50 mg, 50 mg, Oral, Q6H PRN, Antwan Stearns MD    ibuprofen (ADVIL,MOTRIN) tablet 400 mg, 400 mg, Oral, Q6H PRN, Antwan Stearns MD    loperamide (IMODIUM) capsule 2 mg, 2 mg, Oral, Q2H PRN, Antwan Stearns MD    magnesium hydroxide (MILK OF MAGNESIA) suspension 10 mL, 10 mL, Oral, Daily PRN, Antwan Stearns MD    mirtazapine (REMERON) tablet 15 mg, 15 mg, Oral, Daily, Antwan Stearns MD, 15 mg at 08/12/24 0940    OLANZapine (zyPREXA) tablet 5 mg, 5 mg, Oral, Nightly, Antwan Stearns MD, 5 mg at 08/11/24 2144    ondansetron ODT (ZOFRAN-ODT) disintegrating tablet 4 mg, 4 mg, Translingual, Q6H PRN, Antwan Stearns MD    polyethylene glycol (MIRALAX) packet 17 g, 17 g, Oral, Daily PRN, Antwan Stearns MD    sodium chloride nasal spray 2 spray, 2 spray, Each Nare, PRN, Antwan Stearns MD    traZODone (DESYREL) tablet 50 mg, 50 mg, Oral, Nightly PRN,  Antwan Stearns MD    Reviewed chart, notes, vitals, labs and EKG personally reviewed.    ASSESSMENT & PLAN:    Suicidal Ideation  -SI with plan  -Admit for crisis stabilization  -SP3     Unspecified psychosis  -Rule out organic illness, substance induced psychosis, mood disorder  -Resumed olanzapine 5 mg nightly on 1124  -Resumed mirtazapine 15 mg nightly on 8/11/2024  -Patient also prescribed Wellbutrin and clonidine, which were held on admission given current presentation  -We will establish outpatient psychiatric care following hospitalization     Unspecified anxiety disorder  -Medication as above  -Continue buspirone 10 mg twice daily  -We will establish outpatient psychiatric care following hospitalization     Methamphetamine use disorder, severe, dependence  -Admission UDS positive.  Patient denies recent use  -Supportive care  -We will refer for substance abuse treatment following hospitalization     Borderline intellectual disability  - Supportive treatment     Cannabis use disorder, severe, dependence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

## 2024-08-14 NOTE — DISCHARGE PLACEMENT REQUEST
"Cruz Monteiro (39 y.o. Male)       Date of Birth   1985    Social Security Number       Address   [address] [place] [address] [address] [id number]    Home Phone   [phone]    MRN   [id number]       Rastafari   None    Marital Status   Single                            Admission Date   8/11/24    Admission Type   Emergency    Admitting Provider   Antwan Stearns MD    Attending Provider   Carlton Lozada MD    Department, Room/Bed   [facility] ADULT PSYCHIATRIC, 1017/1S       Discharge Date       Discharge Disposition   Home or Self Care    Discharge Destination                                 Attending Provider: Carlton Lozada MD    Allergies: Amoxicillin, Penicillins, Venlafaxine    Isolation: None   Infection: None   Code Status: CPR    Ht: 172.7 cm (68\")   Wt: 58.4 kg (128 lb 12.8 oz)    Admission Cmt: None   Principal Problem: Suicidal behavior [R45.89]                   Active Insurance as of 8/11/2024       Primary Coverage       Payor Plan Insurance Group Employer/Plan Group    MEDICARE MEDICARE A & B        Payor Plan Address Payor Plan Phone Number Payor Plan Fax Number Effective Dates    [address] [phone]  11/1/2013 - None Entered    [facility] [id number]         Subscriber Name Subscriber Birth Date Member ID       CRUZ MONTEIRO 1985 [id number]               Secondary Coverage       Payor Plan Insurance Group Employer/Plan Group    [place] [id number]       Payor Plan Address Payor Plan Phone Number Payor Plan Fax Number Effective Dates    [employer] [address] [address] [phone]  1/1/2020 - None Entered    [facility] [id number]         Subscriber Name Subscriber Birth Date Member ID       CRUZ MONTEIRO 1985 [id number]                     Emergency Contacts        (Rel.) Home Phone Work Phone Mobile Phone    STATE,GUARDIAN (Legal Guardian) [phone] -- --    Aric Monteiro (Father) " [phone] -- --                 Discharge Summaryl        Petros London at 08/14/24 1142           Therapist and patient met on this date to discuss discharge, disposition plan, and safety plan. Patient reports that he is feeling well and is ready to go home. Patient reports feeling safe in his brother's home and has no concerns about his discharge.  Patient reports that his depression is at a 1/10 and his anxiety 1/10. Patient denies any SI/HI/AVH.     Therapist spoke with State Guardian who was in agreement of his discharge and disposition plan. Guardian has no concerns at this time.     Therapist discussed safety plan, limiting access to firearms, sharp objects, medications, or any other potentially hazardous materials. Patient agreeable.     Safety planning completed with brother Aric Mims on this date. Brother is agreeable to limit access to firearms, sharp objects, medications, or any other potentially hazardous materials.     Aftercare has been coordinated for [facility] in [place].     [facility] will provide transportation upon discharge.    Electronically signed by Petros London at 08/14/24 1149       Petros London at 08/14/24 1015            Cruz Mims (39 y.o. Male)       Date of Birth   1985    Social Security Number       Address   [address] [place] [address]    Home Phone   [phone]    MRN   [id number]       [employer]aritan   None    Marital Status   Single                            Admission Date   8/11/24    Admission Type   Emergency    Admitting Provider   Antwan Stearns MD    Attending Provider   Carlton Lozada MD    Department, Room/Bed   [facility] ADULT PSYCHIATRIC, 1017/1S       Discharge Date       Discharge Disposition   Home or Self Care    Discharge Destination                                 Attending Provider: Carlton Lozada MD    Allergies: Amoxicillin, Penicillins, Venlafaxine     "Isolation: None   Infection: None   Code Status: CPR    Ht: 172.7 cm (68\")   Wt: 58.4 kg (128 lb 12.8 oz)    Admission Cmt: None   Principal Problem: Suicidal behavior [R45.89]                   Active Insurance as of 8/11/2024       Primary Coverage       Payor Plan Insurance Group Employer/Plan Group    MEDICARE MEDICARE A & B        Payor Plan Address Payor Plan Phone Number Payor Plan Fax Number Effective Dates    [address] [phone]  11/1/2013 - None Entered    [facility] [id number]         Subscriber Name Subscriber Birth Date Member ID       SANDEEP MONTEIRO M 1985 [id number]               Secondary Coverage       Payor Plan Insurance Group Employer/Plan Group    [place] [id number]       Payor Plan Address Payor Plan Phone Number Payor Plan Fax Number Effective Dates    [employer] [address] [address] [phone]  1/1/2020 - None Entered    [facility] [id number]         Subscriber Name Subscriber Birth Date Member ID       SANDEEP MONTEIRO  1985 [id number]                     Emergency Contacts        (Rel.) Home Phone Work Phone Mobile Phone    STATE,GUARDIAN (Legal Guardian) [phone] -- --    Aric Monteiro (Father) [phone] -- --                 Discharge Summaryl        Manisha Quiroga RN at 08/14/24 0944          Resume regular/home diet.    Electronically signed by Manisha Quiroga RN at 08/14/24 0944       Manisha Quiroga RN at 08/14/24 0944          Activity as tolerated.    Electronically signed by Manisha Quiroga RN at 08/14/24 0944       Lizett Epps, RN at 08/14/24 0618          Pt slept approximately 7 hours this shift.     Electronically signed by Lizett Epps, RN at 08/14/24 0618       Lizett Epps, RN at 08/14/24 0251          Goal Outcome Evaluation:  Plan of Care Reviewed With: patient  Patient Agreement with Plan of Care: agrees     Progress: improving  Outcome Evaluation: Pt denies anxiety, depression, SI/HI/AVH. " Pt reports good sleep and appetite.                                 Electronically signed by Lizett Epps RN at 08/14/24 0251       Daisy Batista RN at 08/13/24 1633          Goal Outcome Evaluation:  Plan of Care Reviewed With: patient  Patient Agreement with Plan of Care: agrees     Progress: improving  Outcome Evaluation: Patient was cooperative with staff during assessment. Patient denied any anxiety or depression, as well as SI/HI/AVH. Pt got out of room a little more today and showered. Pt had no complaints this shift.                                 Electronically signed by Daisy Batista RN at 08/13/24 1635       Martha Rubin at 08/13/24 1503          [facility] [address], [place] [address]  [phone]    Friday, August 16 2024 at 9:00am.    Electronically signed by Martha Rubin at 08/13/24 1503       Petros London at 08/13/24 5035           Goal Outcome Evaluation:    Problem: Adult Behavioral Health Plan of Care  Goal: Patient-Specific Goal (Individualization)  Outcome: Ongoing, Progressing  Flowsheets  Taken 8/12/2024 1502 by Petros London  Patient-Specific Goals (Include Timeframe): Identify 2-3 coping skills, complete aftercare plan, complete safety plan, and deny SI/HI prior to discharge.  Individualized Care Needs: Therapist to offer 1-4 therapy sessions, daily groups, family education, aftercare recommendation, safety planning, and brief CBT/MI interventions during hospitalization.  Taken 8/12/2024 1029 by Petros London  Patient Personal Strengths:   family/social support   resilient  Patient Vulnerabilities: limited social skills  Taken 8/11/2024 0332 by Lizett Epps, RN  Anxieties, Fears or Concerns: none verbalized     Problem: Adult Behavioral Health Plan of Care  Goal: Optimized Coping Skills in Response to Life Stressors  Outcome: Ongoing, Progressing  Flowsheets (Taken 8/12/2024 1508)  Optimized Coping Skills in Response to  Life Stressors: making progress toward outcome  Intervention: Promote Effective Coping Strategies  Flowsheets (Taken 8/13/2024 1333)  Supportive Measures:   active listening utilized   verbalization of feelings encouraged   positive reinforcement provided   counseling provided     Problem: Adult Behavioral Health Plan of Care  Goal: Develops/Participates in Therapeutic [place] to Support Successful Transition  Outcome: Ongoing, Progressing  Flowsheets (Taken 8/12/2024 1508)  Develops/Participates in Therapeutic [place] to Support Successful Transition: making progress toward outcome  Intervention: Foster Therapeutic [place]  Flowsheets (Taken 8/13/2024 1333)  Trust Relationship/Rapport:   care explained   choices provided   emotional support provided   empathic listening provided   questions answered   questions encouraged   thoughts/feelings acknowledged   reassurance provided  Intervention: Mutually Develop Transition Plan  Flowsheets  Taken 8/13/2024 1333 by Petros London  Transition Support:   follow-up care discussed   community resources reviewed  Taken 8/12/2024 1526 by Petros London  Readmission Within the Last 30 Days: no previous admission in last 30 days  Taken 8/12/2024 1508 by Petros London  Discharge Coordination/Progress:   Therapist met with patient to complete discharge needs assessment   patient considering outpatient services. Patient has transportation.  Anticipated Discharge Disposition: home with family  Transportation Concerns: no car  Current Discharge Risk: psychiatric illness  Concerns to be Addressed: mental health  Taken 8/11/2024 0332 by Lizett Epps RN  Transportation Anticipated: family or friend will provide  Patient/Family Anticipated Services at Transition: none  Patient/Family Anticipates Transition to: home with family    DATA:Therapist met with Patient individually this date. Patient agreeable to discuss current treatment progress and discharge concerns.     Therapist  contacted State Guardian Vicki Bourne at number [phone] through the [facility] of [employer]. Guardian provided verbal consent for return home to Brother, Aric Mims's care([phone]).     Guardian provided consent for aftercare with [facility] in [place].     Therapist made an attempt to call brother to complete safety and discharge planning, but the call was unanswered. Message left for him to call back.     CLINICAL MANUVERING/INTERVENTIONS:  Assisted Patient in processing session content; acknowledged and normalized Patient’s thoughts, feelings, and concerns by utilizing a person-centered approach in efforts to build appropriate rapport and a positive therapeutic relationship with open and honest communication. Allowed Patient to ventilate regarding current stressors and triggers for negative emotions and thoughts in a safe nonjudgmental environment with unconditional positive regard, active listening skills, and empathy.      ASSESSMENT: Patient is a 39 year old male who presented to the ED with SI. Patient states that he is feeling much better. Patient is eager to return home where he claims he is staying with his brother, contrary to prior reports. Patient confirms that he has a state guardian, but cannot remember her name.  Patient is agreeable to care and cooperative. Patient rates that his depression is at a 0/10 and his anxiety at a 1/10. Patient denies SI/HI/AVH.       PLAN:   Patient will continue stabilization. Patient will continue to receive services offered by Treatment Team.     Patient will follow up with [facility] in [place].     Transportation needs unclear.     Electronically signed by Petros London at 08/13/24 8882       Daisy Batista RN at 08/13/24 2194          Goal Outcome Evaluation:  Plan of Care Reviewed With: patient  Patient Agreement with Plan of Care: agrees     Progress: improving  Outcome Evaluation: Patient was cooperative with staff during assessment.  "Patient denied any anxiety or depression, as well as SI/HI/AVH. Pt got out of room a little more today and showered. Pt had no complaints this shift.                                 Electronically signed by Daisy Batista RN at 24 5869       Carlton Lozada MD at 24 1004          INPATIENT PSYCHIATRIC PROGRESS NOTE    Name:  Cruz Mims  :  1985  MRN:  [id number]  Visit Number:  [id number]  Length of stay:  2    Behavioral Health Treatment Plan and Problem List: I have reviewed and approved the Behavioral Health Treatment Plan and Problem list.    SUBJECTIVE    CC/ Focus of exam:  psychosis/ depression    Patient's subjective status: \"I'm doing ok\"    INTERVAL HISTORY: Chart reviewed and patient interviewed.  This morning he presents calmly clear of any apparent psychotic thought content and affect appropriate.  He certainly minimizes substance abuse as an issue, specifically methamphetamine.  He states he has been hospitalized once in [place] since his last hospitalization here in , states he has been living with his brother in [place] for the past 4 to 5 months.  Is under state guardianship who has  approved this.  He states that the household is composed of his father's  (father  3 to 4 months PTA), her 3-year-old daughter, his brother and his girlfriend.  He describes the home situation is peaceful and drug free.  He states he has been in contact with the [facility] clinic there in [place] and perhaps has plans to participate in their day hospital program although is not sure.  As with his prior admissions his presenting symptoms have cleared rapidly and after checking with his home situation and guardian he should be able to be discharged safely within the next day or 2.    Depression rating says not  Anxiety rating says not  Hopelessness: Says not  Sleep: 9 hours  Withdrawal sx: None  Craving: Says not/10       ROS: No cardiovascular, GI, or Neurological " complaints.       OBJECTIVE    Vitals:    08/13/24 0743   BP: 94/57   Pulse: 55   Resp: 18   Temp: 98.6 °F (37 °C)   SpO2: 97%      Temp:  [97 °F (36.1 °C)-98.8 °F (37.1 °C)] 98.6 °F (37 °C)  Heart Rate:  [] 55  Resp:  [18] 18  BP: ()/(56-75) 94/57    MENTAL STATUS EXAM:       Psychomotor: No psychomotor agitation/retardation, No EPS, No motor tics  Speech-normal rate, amount.  Mood/Affect: Appropriate  Thought Processes: coherent  Thought Content: normal  Hallucination(s): none  Hopelessness: No  Optimistic: minimally  Suicidal Thoughts: No  Suicidal Plan/Intent: No  Homicidal Thoughts: absent  Orientation: oriented x 3  Memory: recent intact    Lab Results (last 24 hours)       ** No results found for the last 24 hours. **             Imaging Results (Last 24 Hours)       ** No results found for the last 24 hours. **             Lab and x-ray studies reviewed.    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     Methamphetamine use disorder, severe, dependence  -Admission UDS positive.  Patient denies recent use  -Supportive care  -We will refer for substance abuse treatment following hospitalization     Borderline intellectual disability  - Supportive treatment     Cannabis use disorder, severe, dependence  -Admission UDS positive  -Supportive care  -Ascertain substance abuse treatment plans  following discharge     Nicotine use disorder, severe, dependence  -21 mg daily patch ordered  -Encouraged cessation     Hepatitis C  -Antibody positive  -Transaminases within normal limits      Special precautions: Special Precautions Level 3 (q15 min checks)     Behavioral Health Treatment Plan and Problem List: I have reviewed and approved the Behavioral Health Treatment Plan and Problem list.    I spent a total of 26 minutes in direct patient care including  17 minutes face to face with the patient assessment, coordination of care, and counseling the patient on the current and follow-up treatment plans regarding his status and situation.  Patient had no additional questions.     SHERLYN Lozada MD    Clinician:  Carlton Lozada MD  08/13/24  10:04 EDT    Dictated utilizing Dragon dictation       Electronically signed by Carlton Lozada MD at 08/13/24 1037       Lizett Epps RN at 08/13/24 0609          Pt slept approximately 9 hours this shift.     Electronically signed by Lizett Epps RN at 08/13/24 0609       Lizett Epps RN at 08/13/24 0102          Goal Outcome Evaluation:  Plan of Care Reviewed With: patient  Patient Agreement with Plan of Care: agrees     Progress: no change  Outcome Evaluation: Pt refused assessment. Pt stays in his room and avoids social contact.                                 Electronically signed by Lizett Epps RN at 08/13/24 0102       Petros London at 08/12/24 1508          Goal Outcome Evaluation:           Progress: improving  Outcome Evaluation: Therapist met with patient to review care plan, social history, aftercare recommendation, and safety plan; patient agreeable.        Problem: Adult Behavioral Health Plan of Care  Goal: Plan of Care Review  Outcome: Ongoing, Progressing  Flowsheets  Taken 8/12/2024 1502 by Petros London  Progress: improving  Outcome Evaluation:   Therapist met with patient to review care plan, social history,  aftercare recommendation, and safety plan   patient agreeable.  Taken 8/12/2024 1436 by Daisy Batista RN  Plan of Care Reviewed With: patient  Patient Agreement with Plan of Care: agrees     Problem: Adult Behavioral Health Plan of Care  Goal: Patient-Specific Goal (Individualization)  Outcome: Ongoing, Progressing  Flowsheets  Taken 8/12/2024 1502 by Petros London  Patient-Specific Goals (Include Timeframe): Identify 2-3 coping skills, complete aftercare plan, complete safety plan, and deny SI/HI prior to discharge.  Individualized Care Needs: Therapist to offer 1-4 therapy sessions, daily groups, family education, aftercare recommendation, safety planning, and brief CBT/MI interventions during hospitalization.  Taken 8/12/2024 1029 by Petros London  Patient Personal Strengths:   family/social support   resilient  Patient Vulnerabilities: limited social skills  Taken 8/11/2024 0332 by Lizett Epps RN  Anxieties, Fears or Concerns: none verbalized     Problem: Adult Behavioral Health Plan of Care  Goal: Optimized Coping Skills in Response to Life Stressors  Outcome: Ongoing, Progressing  Flowsheets (Taken 8/12/2024 1508)  Optimized Coping Skills in Response to Life Stressors: making progress toward outcome  Intervention: Promote Effective Coping Strategies  Flowsheets (Taken 8/12/2024 1508)  Supportive Measures:   active listening utilized   counseling provided   self-care encouraged   verbalization of feelings encouraged     Problem: Adult Behavioral Health Plan of Care  Goal: Develops/Participates in Therapeutic [place] to Support Successful Transition  Outcome: Ongoing, Progressing  Flowsheets (Taken 8/12/2024 1508)  Develops/Participates in Therapeutic [place] to Support Successful Transition: making progress toward outcome  Intervention: Foster Therapeutic [place]  Flowsheets (Taken 8/12/2024 1508)  Trust Relationship/Rapport:   care explained   choices provided   empathic listening provided    questions answered   questions encouraged   thoughts/feelings acknowledged   emotional support provided   reassurance provided  Intervention: Mutually Develop Transition Plan  Flowsheets  Taken 8/12/2024 1508 by Petros London  Discharge Coordination/Progress:   Therapist met with patient to complete discharge needs assessment   patient considering outpatient services. Patient has transportation.  Anticipated Discharge Disposition: home with family  Transportation Concerns: no car  Current Discharge Risk: psychiatric illness  Concerns to be Addressed: mental health  Readmission Within the Last 30 Days: no previous admission in last 30 days  Taken 8/11/2024 0332 by Lizett Epps RN  Transportation Anticipated: family or friend will provide  Patient/Family Anticipated Services at Transition: none  Patient/Family Anticipates Transition to: home with family    DATA:  Therapist met individually with Patient this date for initial evaluation.  Introduced self as Therapist and the role of a positive therapeutic relationship; Patient agreeable.      Therapist encouraged Patient to speak openly and honestly about any issues or stressors during treatment stay. Therapist explained how open communication is significant to providing most effective care.      Therapist completed psychosocial assessment, integrated summary, reviewed care plans, disposition planning and discussed hospitalization expectations and treatment goals this date.     Therapist is recommending family involvement prior to discharge and it's importance. Patient agreeable, awaiting consent from state guardian.     State guardian has been contacted via local Guardianship office. Number listed is for calls to the office after hours. Awaiting consent from state guardian at this time.     CLINICAL MANUVERING/INTERVENTIONS:  Assisted Patient in processing session content; acknowledged and normalized Patient’s thoughts, feelings, and concerns by utilizing a  person-centered approach in efforts to build appropriate rapport and a positive therapeutic relationship with open and honest communication. Allowed Patient to ventilate regarding current stressors and triggers for negative emotions and thoughts in a safe nonjudgmental environment with unconditional positive regard, active listening skills, and empathy.     ASSESSMENT: Patient is a 39 year old male who presented to the ED with SI. Patient was seen by therapist 1:1 bedside on this date. Patient reports that he is feeling better than when he came in. Patient reports a decline in depression and anxiety and reports that they are both very low on a scale of 1-10, but was unable to provide a true number. Patient struggled to communicate with therapist due to speak impediment and became frustrated with this struggle. Patient denies SI/HI/AVH at this time.       PLAN:   Patient will receive  nursing monitoring and daily psychiatrist evaluation by a multidisciplinary team.    Patient will continue stabilization at this time.     Public assistance with transportation will not be needed. Family member will provide.          Electronically signed by Petros London at 24 1519       Daisy Batista RN at 24 1448          Goal Outcome Evaluation:  Plan of Care Reviewed With: patient  Patient Agreement with Plan of Care: agrees     Progress: no change  Outcome Evaluation: Patient isolated to room most of shift and refused to answer any asessment questions. Patient slept majority of shift.                                 Electronically signed by Daisy Batista RN at 24 1449       Antwan Stearns MD at 24 1323          INPATIENT PSYCHIATRIC PROGRESS NOTE    Name:  Cruz Mims  :  1985  MRN:  [id number]  Visit Number:  [id number]  Length of stay:  1    SUBJECTIVE    CC/Focus of Exam: Agitation, SI    INTERVAL HISTORY:  Patient minimally cooperative thus far, largely isolating.  When he  "does awake or emerge, he is somewhat disorganized and a poor historian.  He did report home medications had not been restarted, specifically Wellbutrin and clonidine.  Patient denied recent substance use other than cannabis, despite methamphetamine found on UDS.    Depression rating 5/10  Anxiety rating 6/10  Sleep: Plenty  Withdrawal sx: Denied  [place]/10    Review of Systems   Constitutional: Negative.    Respiratory: Negative.     Cardiovascular: Negative.    Gastrointestinal: Negative.    Musculoskeletal: Negative.    Psychiatric/Behavioral:  Positive for behavioral problems and dysphoric mood. The patient is nervous/anxious.        OBJECTIVE    Temp:  [97.5 °F (36.4 °C)-98 °F (36.7 °C)] 98 °F (36.7 °C)  Heart Rate:  [62-72] 62  Resp:  [16-18] 18  BP: (91-94)/(54-60) 91/54    MENTAL STATUS EXAM:  Appearance: Casually dressed, fair hygeine.   Cooperation: Guarded  Psychomotor: No psychomotor agitation/retardation, No EPS, No motor tics  Speech: Decreased rate, amount.  Mood: \"Not sure\"   Affect: congruent, restricted, intermittently irritable  Thought Content: Hospital delusional material present  Thought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    CREATININE 0.74 (L) 08/11/2024    EGFRIFNONA 133 03/14/2021    BCR 21.6 08/11/2024    CO2 24.0 08/11/2024    CALCIUM 9.7 08/11/2024    ALBUMIN 4.4 08/11/2024    LABIL2 1.4 (L) 06/08/2015    AST 31 08/11/2024    ALT 33 08/11/2024       Lab Results   Component Value Date    WBC 7.24 08/11/2024    HGB 14.7 08/11/2024    HCT 44.3 08/11/2024    MCV 90.8 08/11/2024     08/11/2024       ECG/EMG Results (most recent)       None             Brief Urine Lab Results  (Last result in the past 365 days)        Color   Clarity   Blood   Leuk Est   Nitrite   Protein   CREAT   Urine HCG        08/11/24 0127 Dark Yellow   Clear   Negative   Negative   Negative   Trace                   Last Urine Toxicity  More data exists         Latest Ref Rng & Units 8/11/2024 3/14/2021   LAST URINE TOXICITY RESULTS   Amphetamine, Urine Qual Negative Positive  Positive     Barbiturates Screen, Urine Negative Negative  Negative    Benzodiazepine Screen, Urine Negative Negative  Negative    Buprenorphine, Screen, Urine Negative Negative  Negative    Cocaine Screen, Urine Negative Negative  Negative    Fentanyl, Urine Negative Negative  -   Methadone Screen , Urine Negative Negative  Negative    Methamphetamine, Ur Negative Positive  -      Details                   Chart, notes, vitals, labs personally reviewed.  + Hep C antibody  Outside [facility] report requested, reviewed  UDS results: + Meth/amp/THC  EKG tracing personally reviewed, interpreted as sinus tachycardia to 107, QTc interval 453  Consulted with patient's therapist regarding clinical history and treatment plan    ASSESSMENT & PLAN:    Suicidal Ideation  -SI with plan  -Admit for crisis stabilization  -SP3    Unspecified psychosis  -Rule out organic illness, substance induced psychosis  -Resume olanzapine  -We will establish outpatient psychiatric care following hospitalization    Other recurrent depressive disorders   -Resume olanzapine and mirtazapine  -We will establish outpatient psychiatric care following hospitalization     Methamphetamine use disorder, severe, dependence  -Admission UDS positive  -Supportive care  -We will refer for substance abuse treatment following hospitalization    Borderline intellectual disability  - Supportive treatment     Cannabis use disorder, severe, dependence  -Admission UDS positive  -Supportive care  -Ascertain substance abuse treatment plans following discharge     Nicotine use disorder, severe, dependence  -21 mg daily patch ordered  -Encouraged cessation    Hepatitis C  -Antibody positive  -Transaminases within normal limits    Hospital bed: No    The patient has been admitted for safety and stabilization.  Patient will be monitored for suicidality daily and maintained on Special Precautions Level 3 (q15 min checks) .  The patient will have individual and group therapy with a  "master's level therapist. A master treatment plan will be developed and agreed upon by the patient and his/her treatment team.  The patient's estimated length of stay in the hospital is 5-7 days.     This note was generated using a scribe, Edith Troncoso.  The work documented in this note was completed, reviewed, and approved by the attending psychiatrist as designated Dr. Antwan Stearns electronic signature.     Electronically signed by Antwan Stearns MD at 08/11/24 1023       Lizett Epps RN at 08/11/24 0609          Pt slept approximately 1.5 hours this shift.     Electronically signed by Lizett Epps RN at 08/11/24 0610       Lizett Epps RN at 08/11/24 0425          Pt is becoming increasingly agitated. Pt is cussing and screaming in his room. Pt threw cereal and milk and said he \"is not fucking cleaning that shit up\". Pt asked to be quiet because his roommate and others are sleeping, pt became louder. Dr. Stearns notified of pt behavior. New orders as follows:   Benadryl 50mg IM once  Haldol 5mg IM once  Ativan 2mg IM once  Private room   [id number]    Electronically signed by Lizett Epps RN at 08/11/24 0436       Sonal Hardy RN at 08/11/24 0425          Pt agitated @0420, yelling and throwing his cereal. Pt told by staff to clean it up and pt refused, cursing at staff. Pt's roommate moved to another room and new orders for B52 and private room placed. This nurse asked pt why he was upset, and he said, “The world.” Attempted to discuss appropriate behavior with pt; pt unable to be redirected, continuing to yell but attempted to clean up cereal mess. Security called for walkthrough, and they stood by while B52 given; pt tolerated without issue.     Electronically signed by Sonal Hardy RN at 08/11/24 1454       Lizett Epps RN at 08/11/24 9422          Goal Outcome Evaluation:                 Care plan initiated                               Electronically signed by Lizett Epps RN " at 08/11/24 032

## 2024-08-14 NOTE — DISCHARGE SUMMARY
Date of Discharge:  8/14/2024    Discharge Diagnosis:Principal Problem:    Suicidal behavior  Active Problems:    Methamphetamine abuse    Borderline intellectual disability    Other recurrent depressive disorders    ADHD (attention deficit hyperactivity disorder)    Brief psychotic disorder        Presenting Problem/History of Present Illness:Patient was admitted to the hospital on August 11 having presented psychotic and agitated, voluntarily admitted for safety evaluation and treatment, see admission note for details.         Hospital Course:    Patient was admitted for safety and stabilization and was placed on standard precautions.  Routine labs were checked.  Patient was assigned a master's level therapist and provided with an opportunity to participate in group and individual therapy on the unit.  Patient seen on a daily basis for evaluation and supportive therapy.  Patient's psychotropic performing at limited to 5 mg olanzapine and 15 mg of mirtazapine at bedtime.  Patient's agitation and psychosis cleared rapidly, and he did not appear to be depressed and was denying any thoughts of harming self or others throughout his hospital stay.  Ostensibly patient's acute psychosis related to substance abuse, specifically methamphetamine although he stated his substance abuse was limited to cannabis.  Patient's medication for ADHD were not prescribed during his stay but at discharge were continued consisting of bupropion  mg (instead of 150) and the clonidine 0.1 mg daily.  Only additional medication at discharge was the olanzapine 5 mg at bedtime.  It was recommended he discontinue BuSpar.  Patient declined recommendations for focused CD outpatient treatment options but was agreeable with follow-up at [facility] in [place] close to his home.  State guardian involved with the patient's care decisions.  See below for details of follow-up and medications.      Consults:   Consults       No  orders found from 7/13/2024 to 8/12/2024.            Labs:  Lab Results (all)       Procedure Component Value Units Date/Time    Hepatitis Panel, Acute [[id number]]  (Abnormal) Collected: 08/10/24 0181    Specimen: Blood Updated: 08/11/24 0527     Hepatitis B Surface Ag Non-Reactive     Hep A IgM Non-Reactive     Hep B C IgM Non-Reactive     Hepatitis C Ab Reactive    Narrative:      Results may be falsely decreased if patient taking Biotin.             Imaging:  Imaging Results (All)       None            Condition on Discharge:  improved    Prognosis: Fair    Vital Signs  Temp:  [97.5 °F (36.4 °C)-97.6 °F (36.4 °C)] 97.5 °F (36.4 °C)  Heart Rate:  [73-94] 73  Resp:  [16-17] 17  BP: (104-115)/(57-71) 115/57    Discharge Disposition  Home or Self Care       Discharge Medications        New Medications        Instructions Start Date   OLANZapine 5 MG tablet  Commonly known as: zyPREXA   5 mg, Oral, Nightly             Changes to Medications        Instructions Start Date   buPROPion  MG 24 hr tablet  Commonly known as: WELLBUTRIN XL  What changed: how much to take   300 mg, Oral, Daily             Continue These Medications        Instructions Start Date   cloNIDine 0.1 MG tablet  Commonly known as: CATAPRES   0.1 mg, Oral, Daily             Stop These Medications      busPIRone 10 MG tablet  Commonly known as: BUSPAR              Discharge Diet: Regular    Activity at Discharge: No restrictions    Follow-up Appointments:    [facility] [address], [place] [address]  [phone]     Friday, August 16 2024 at 9:00am.        Carlton Lozada MD  08/14/24  09:25 EDT  .    Time: I spent greater than 30 minutes on this discharge activity which included: face-to-face encounter with the patient, reviewing the data in the system, coordination of the care with the nursing staff as well as consultants, documentation, and entering orders.      Dictated utilizing Srinath  dictation

## 2024-08-14 NOTE — PLAN OF CARE
Goal Outcome Evaluation:  Plan of Care Reviewed With: patient  Patient Agreement with Plan of Care: agrees     Progress: improving  Outcome Evaluation: Pt denies anxiety, depression, SI/HI/AVH. Pt reports good sleep and appetite.

## 2024-08-17 ENCOUNTER — HOSPITAL ENCOUNTER (EMERGENCY)
Facility: HOSPITAL | Age: 39
Discharge: HOME | End: 2024-08-17
Attending: STUDENT IN AN ORGANIZED HEALTH CARE EDUCATION/TRAINING PROGRAM
Payer: MEDICARE

## 2024-08-17 VITALS
SYSTOLIC BLOOD PRESSURE: 126 MMHG | OXYGEN SATURATION: 98 % | HEIGHT: 68 IN | BODY MASS INDEX: 19.85 KG/M2 | SYSTOLIC BLOOD PRESSURE: 126 MMHG | RESPIRATION RATE: 18 BRPM | WEIGHT: 131 LBS | HEIGHT: 68 IN | HEART RATE: 89 BPM | BODY MASS INDEX: 19.85 KG/M2 | HEART RATE: 89 BPM | OXYGEN SATURATION: 98 % | TEMPERATURE: 97.9 F | DIASTOLIC BLOOD PRESSURE: 78 MMHG | DIASTOLIC BLOOD PRESSURE: 78 MMHG | RESPIRATION RATE: 18 BRPM | TEMPERATURE: 97.9 F | WEIGHT: 131 LBS

## 2024-08-17 DIAGNOSIS — F19.10 SUBSTANCE ABUSE: Primary | ICD-10-CM

## 2024-08-17 LAB
ALBUMIN SERPL-MCNC: 4.6 G/DL (ref 3.5–5.2)
ALBUMIN SERPL-MCNC: 4.6 G/DL (ref 3.5–5.2)
ALBUMIN/GLOB SERPL: 1.4 G/DL
ALBUMIN/GLOB SERPL: 1.4 G/DL
ALP SERPL-CCNC: 68 U/L (ref 39–117)
ALP SERPL-CCNC: 68 U/L (ref 39–117)
ALT SERPL W P-5'-P-CCNC: 36 U/L (ref 1–41)
ALT SERPL W P-5'-P-CCNC: 36 U/L (ref 1–41)
AMPHET+METHAMPHET UR QL: POSITIVE
AMPHET+METHAMPHET UR QL: POSITIVE
AMPHETAMINES UR QL: POSITIVE
AMPHETAMINES UR QL: POSITIVE
ANION GAP SERPL CALCULATED.3IONS-SCNC: 13 MMOL/L (ref 5–15)
ANION GAP SERPL CALCULATED.3IONS-SCNC: 13 MMOL/L (ref 5–15)
APAP SERPL-MCNC: <5 MCG/ML (ref 0–30)
APAP SERPL-MCNC: <5 MCG/ML (ref 0–30)
AST SERPL-CCNC: 34 U/L (ref 1–40)
AST SERPL-CCNC: 34 U/L (ref 1–40)
BARBITURATES UR QL SCN: NEGATIVE
BARBITURATES UR QL SCN: NEGATIVE
BASOPHILS # BLD AUTO: 0.09 10*3/MM3 (ref 0–0.2)
BASOPHILS # BLD AUTO: 0.09 10*3/MM3 (ref 0–0.2)
BASOPHILS NFR BLD AUTO: 0.6 % (ref 0–1.5)
BASOPHILS NFR BLD AUTO: 0.6 % (ref 0–1.5)
BENZODIAZ UR QL SCN: NEGATIVE
BENZODIAZ UR QL SCN: NEGATIVE
BILIRUB SERPL-MCNC: 0.7 MG/DL (ref 0–1.2)
BILIRUB SERPL-MCNC: 0.7 MG/DL (ref 0–1.2)
BILIRUB UR QL STRIP: ABNORMAL
BILIRUB UR QL STRIP: ABNORMAL
BUN SERPL-MCNC: 17 MG/DL (ref 6–20)
BUN SERPL-MCNC: 17 MG/DL (ref 6–20)
BUN/CREAT SERPL: 18.5 (ref 7–25)
BUN/CREAT SERPL: 18.5 (ref 7–25)
BUPRENORPHINE SERPL-MCNC: NEGATIVE NG/ML
BUPRENORPHINE SERPL-MCNC: NEGATIVE NG/ML
CALCIUM SPEC-SCNC: 9.8 MG/DL (ref 8.6–10.5)
CALCIUM SPEC-SCNC: 9.8 MG/DL (ref 8.6–10.5)
CANNABINOIDS SERPL QL: NEGATIVE
CANNABINOIDS SERPL QL: NEGATIVE
CHLORIDE SERPL-SCNC: 99 MMOL/L (ref 98–107)
CHLORIDE SERPL-SCNC: 99 MMOL/L (ref 98–107)
CLARITY UR: CLEAR
CLARITY UR: CLEAR
CO2 SERPL-SCNC: 25 MMOL/L (ref 22–29)
CO2 SERPL-SCNC: 25 MMOL/L (ref 22–29)
COCAINE UR QL: NEGATIVE
COCAINE UR QL: NEGATIVE
COLOR UR: ABNORMAL
COLOR UR: ABNORMAL
CREAT SERPL-MCNC: 0.92 MG/DL (ref 0.76–1.27)
CREAT SERPL-MCNC: 0.92 MG/DL (ref 0.76–1.27)
DEPRECATED RDW RBC AUTO: 44 FL (ref 37–54)
DEPRECATED RDW RBC AUTO: 44 FL (ref 37–54)
EGFRCR SERPLBLD CKD-EPI 2021: 109.9 ML/MIN/1.73
EGFRCR SERPLBLD CKD-EPI 2021: 109.9 ML/MIN/1.73
EOSINOPHIL # BLD AUTO: 0.23 10*3/MM3 (ref 0–0.4)
EOSINOPHIL # BLD AUTO: 0.23 10*3/MM3 (ref 0–0.4)
EOSINOPHIL NFR BLD AUTO: 1.6 % (ref 0.3–6.2)
EOSINOPHIL NFR BLD AUTO: 1.6 % (ref 0.3–6.2)
ERYTHROCYTE [DISTWIDTH] IN BLOOD BY AUTOMATED COUNT: 12.9 % (ref 12.3–15.4)
ERYTHROCYTE [DISTWIDTH] IN BLOOD BY AUTOMATED COUNT: 12.9 % (ref 12.3–15.4)
ETHANOL BLD-MCNC: <10 MG/DL (ref 0–10)
ETHANOL BLD-MCNC: <10 MG/DL (ref 0–10)
ETHANOL UR QL: <0.01 %
ETHANOL UR QL: <0.01 %
FENTANYL UR-MCNC: NEGATIVE NG/ML
FENTANYL UR-MCNC: NEGATIVE NG/ML
GLOBULIN UR ELPH-MCNC: 3.4 GM/DL
GLOBULIN UR ELPH-MCNC: 3.4 GM/DL
GLUCOSE SERPL-MCNC: 147 MG/DL (ref 65–99)
GLUCOSE SERPL-MCNC: 147 MG/DL (ref 65–99)
GLUCOSE UR STRIP-MCNC: NEGATIVE MG/DL
GLUCOSE UR STRIP-MCNC: NEGATIVE MG/DL
HCT VFR BLD AUTO: 43.7 % (ref 37.5–51)
HCT VFR BLD AUTO: 43.7 % (ref 37.5–51)
HGB BLD-MCNC: 14.3 G/DL (ref 13–17.7)
HGB BLD-MCNC: 14.3 G/DL (ref 13–17.7)
HGB UR QL STRIP.AUTO: NEGATIVE
HGB UR QL STRIP.AUTO: NEGATIVE
HOLD SPECIMEN: NORMAL
IMM GRANULOCYTES # BLD AUTO: 0.02 10*3/MM3 (ref 0–0.05)
IMM GRANULOCYTES # BLD AUTO: 0.02 10*3/MM3 (ref 0–0.05)
IMM GRANULOCYTES NFR BLD AUTO: 0.1 % (ref 0–0.5)
IMM GRANULOCYTES NFR BLD AUTO: 0.1 % (ref 0–0.5)
KETONES UR QL STRIP: ABNORMAL
KETONES UR QL STRIP: ABNORMAL
LEUKOCYTE ESTERASE UR QL STRIP.AUTO: NEGATIVE
LEUKOCYTE ESTERASE UR QL STRIP.AUTO: NEGATIVE
LYMPHOCYTES # BLD AUTO: 4.03 10*3/MM3 (ref 0.7–3.1)
LYMPHOCYTES # BLD AUTO: 4.03 10*3/MM3 (ref 0.7–3.1)
LYMPHOCYTES NFR BLD AUTO: 28.1 % (ref 19.6–45.3)
LYMPHOCYTES NFR BLD AUTO: 28.1 % (ref 19.6–45.3)
MAGNESIUM SERPL-MCNC: 2.1 MG/DL (ref 1.6–2.6)
MAGNESIUM SERPL-MCNC: 2.1 MG/DL (ref 1.6–2.6)
MCH RBC QN AUTO: 30 PG (ref 26.6–33)
MCH RBC QN AUTO: 30 PG (ref 26.6–33)
MCHC RBC AUTO-ENTMCNC: 32.7 G/DL (ref 31.5–35.7)
MCHC RBC AUTO-ENTMCNC: 32.7 G/DL (ref 31.5–35.7)
MCV RBC AUTO: 91.6 FL (ref 79–97)
MCV RBC AUTO: 91.6 FL (ref 79–97)
METHADONE UR QL SCN: NEGATIVE
METHADONE UR QL SCN: NEGATIVE
MONOCYTES # BLD AUTO: 1.04 10*3/MM3 (ref 0.1–0.9)
MONOCYTES # BLD AUTO: 1.04 10*3/MM3 (ref 0.1–0.9)
MONOCYTES NFR BLD AUTO: 7.2 % (ref 5–12)
MONOCYTES NFR BLD AUTO: 7.2 % (ref 5–12)
NEUTROPHILS NFR BLD AUTO: 62.4 % (ref 42.7–76)
NEUTROPHILS NFR BLD AUTO: 62.4 % (ref 42.7–76)
NEUTROPHILS NFR BLD AUTO: 8.94 10*3/MM3 (ref 1.7–7)
NEUTROPHILS NFR BLD AUTO: 8.94 10*3/MM3 (ref 1.7–7)
NITRITE UR QL STRIP: NEGATIVE
NITRITE UR QL STRIP: NEGATIVE
NRBC BLD AUTO-RTO: 0 /100 WBC (ref 0–0.2)
NRBC BLD AUTO-RTO: 0 /100 WBC (ref 0–0.2)
OPIATES UR QL: NEGATIVE
OPIATES UR QL: NEGATIVE
OXYCODONE UR QL SCN: NEGATIVE
OXYCODONE UR QL SCN: NEGATIVE
PCP UR QL SCN: NEGATIVE
PCP UR QL SCN: NEGATIVE
PH UR STRIP.AUTO: 5.5 [PH] (ref 5–8)
PH UR STRIP.AUTO: 5.5 [PH] (ref 5–8)
PLATELET # BLD AUTO: 286 10*3/MM3 (ref 140–450)
PLATELET # BLD AUTO: 286 10*3/MM3 (ref 140–450)
PMV BLD AUTO: 10.1 FL (ref 6–12)
PMV BLD AUTO: 10.1 FL (ref 6–12)
POTASSIUM SERPL-SCNC: 3.7 MMOL/L (ref 3.5–5.2)
POTASSIUM SERPL-SCNC: 3.7 MMOL/L (ref 3.5–5.2)
PROT SERPL-MCNC: 8 G/DL (ref 6–8.5)
PROT SERPL-MCNC: 8 G/DL (ref 6–8.5)
PROT UR QL STRIP: ABNORMAL
PROT UR QL STRIP: ABNORMAL
RBC # BLD AUTO: 4.77 10*6/MM3 (ref 4.14–5.8)
RBC # BLD AUTO: 4.77 10*6/MM3 (ref 4.14–5.8)
SALICYLATES SERPL-MCNC: <0.3 MG/DL
SALICYLATES SERPL-MCNC: <0.3 MG/DL
SODIUM SERPL-SCNC: 137 MMOL/L (ref 136–145)
SODIUM SERPL-SCNC: 137 MMOL/L (ref 136–145)
SP GR UR STRIP: >1.03 (ref 1–1.03)
SP GR UR STRIP: >1.03 (ref 1–1.03)
TRICYCLICS UR QL SCN: NEGATIVE
TRICYCLICS UR QL SCN: NEGATIVE
UROBILINOGEN UR QL STRIP: ABNORMAL
UROBILINOGEN UR QL STRIP: ABNORMAL
WBC NRBC COR # BLD AUTO: 14.35 10*3/MM3 (ref 3.4–10.8)
WBC NRBC COR # BLD AUTO: 14.35 10*3/MM3 (ref 3.4–10.8)
WHOLE BLOOD HOLD COAG: NORMAL
WHOLE BLOOD HOLD COAG: NORMAL
WHOLE BLOOD HOLD SPECIMEN: NORMAL
WHOLE BLOOD HOLD SPECIMEN: NORMAL

## 2024-08-17 PROCEDURE — 85025 COMPLETE CBC W/AUTO DIFF WBC: CPT | Performed by: NURSE PRACTITIONER

## 2024-08-17 PROCEDURE — 80307 DRUG TEST PRSMV CHEM ANLYZR: CPT | Performed by: NURSE PRACTITIONER

## 2024-08-17 PROCEDURE — 82077 ASSAY SPEC XCP UR&BREATH IA: CPT | Performed by: NURSE PRACTITIONER

## 2024-08-17 PROCEDURE — 81003 URINALYSIS AUTO W/O SCOPE: CPT | Performed by: NURSE PRACTITIONER

## 2024-08-17 PROCEDURE — 80143 DRUG ASSAY ACETAMINOPHEN: CPT | Performed by: NURSE PRACTITIONER

## 2024-08-17 PROCEDURE — 83735 ASSAY OF MAGNESIUM: CPT | Performed by: NURSE PRACTITIONER

## 2024-08-17 PROCEDURE — 99284 EMERGENCY DEPT VISIT MOD MDM: CPT

## 2024-08-17 PROCEDURE — 36415 COLL VENOUS BLD VENIPUNCTURE: CPT

## 2024-08-17 PROCEDURE — 80179 DRUG ASSAY SALICYLATE: CPT | Performed by: NURSE PRACTITIONER

## 2024-08-17 PROCEDURE — 80053 COMPREHEN METABOLIC PANEL: CPT | Performed by: NURSE PRACTITIONER

## 2024-08-17 RX ORDER — LORAZEPAM 2 MG/1
2 TABLET ORAL ONCE
Status: COMPLETED | OUTPATIENT
Start: 2024-08-17 | End: 2024-08-17

## 2024-08-17 RX ADMIN — LORAZEPAM 2 MG: 2 TABLET ORAL at 05:26

## 2024-08-17 NOTE — NURSING NOTE
Medication administered. Unable to assess pt at this time due to excessive pacing, jumping, and pt wandering away while staff talking to him. Assessment to be completed when pt is able to participate.

## 2024-08-17 NOTE — NURSING NOTE
"Intake assessment completed at this time. Pt presents to Intake via [facility] Dept. Pt agitated, anxious, hyperactive, flailing arms and legs, jumping around on arrival. Pt reports taking IV methamphetamine PTA. Pt at first unable to be assessed d/t inability to concentrate or remain still. Medication provided. When pt was able to answer questions, pt had severe response lag and has speech impediment. Pt stated at beginning of assessment, \"I want to kill myself. I got frustrated and lost it. Thought about slitting my throat.\", but denies wishing to be dead or wishing to end his own life and denies any plan to end his own life later in assessment. Pt touching self under blanket during assessment. Pt reports argument with brother after waking brother from sleep. Pt states stressors as, \"Things happening at the house. I was trying to get something out of the bedroom and my brother yelled at me, said he was trying to sleep, called me retarded. Said he was going to call the police. I told him to call them, then.\" Pt stating he is not going to return to the home with his brother, because \"something bad always happens.\" Pt states brother calls him names. Pt denying abuse. Pt states he [facility] and wants to be prescribed Suboxone. Pt continued masturbating under blanket once assessment was over, then uncovered genitals to continue masturbating. Pt told repeatedly that this is not allowed in this department, but pt refusing to keep hands off genitals. Pt has State Guardian, but lives with brother.     Labs  UDS + meth, amphetamines    WBC 14.35    Anxiety 5 depression 10 craving 10 on scale of 0-10. Sleep poor appetite good.  Pt denies any SI/HI/AVH.    Pt A&Ox 2.  "
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
## 2024-08-17 NOTE — NURSING NOTE
Spoke to Family on the phone. Sister in law instructed that  at this time he is ok and not voicing any active SI or HI. She states if the patient wants to come home they are agreeable to letting him come home.     Instructed they were just worried about him last night.     Spoke to patient and he states yes he will go home.     Family report that they just dont have no way to get him home. And will need a ride for him.     Also provided patient a list of resources for substance abuse treatment. Encouraged him to seek treatment for the meth use and educated him on the benefits of treatment. Pt verbalized understanding.

## 2024-08-17 NOTE — NURSING NOTE
"Per pt's discharge Summary on 8/14 by Carlton Lozada in pt's Merged chart, \"Patient's agitation and psychosis cleared rapidly, and he did not appear to be depressed and was denying any thoughts of harming self or others throughout his hospital stay.  Ostensibly patient's acute psychosis related to substance abuse, specifically methamphetamine although he stated his substance abuse was limited to cannabis.\" Per Nursing Notes from same admission, pt had difficulty at the beginning of his stay. Pt cursed staff and threw cereal, refusing to clean up the mess shortly after arrival to Unit on 8/11. On days 8/12 and 8/13, pt isolated self in his room and refused assessment. On day of discharge on 8/14, pt participated in assessment, denying any SI/HI/AVH and wished to return to brother's home on D/C.  "

## 2024-08-17 NOTE — NURSING NOTE
Pt remains unable to sit still, kicking legs while seated, then jumping up and pacing. Pt states he might need some medication to help calm down. MUKUND Mckenna notified. Orders received.

## 2024-08-17 NOTE — NURSING NOTE
Felicity, [facility]  staff returned call and pt situation provided. Per Felicity, there are no resources they can provide in this situation. Contact State Guardian or  for alternate placement.

## 2024-08-17 NOTE — NURSING NOTE
"Pt jumping, dancing, bouncing up and down in ED6. Pt bending over with head near knees, jumping around chairs. Explained to pt that these behaviors are dangerous and he might fall and requested pt sit down. Pt states, \"I can't. I'm anxious.\" Explained to pt that we cannot allow him to jump around close to the chairs. Pt decided to lay in the floor. Pt flailing arms into wall, then jumping back up to pace and jump around room once more. Mats provided to pt and requested once more that pt either sit in a chair or lie on mats for safety. Pt currently lying on mats, rolling and thrashing body on floor. When asked why pt is flailing legs and arms, pt states, \"Someone is killing me.\" When asked who pt feels is killing him, pt states, \"A ghost, I guess.\" Advised pt there are no ghosts in the room with him.  "

## 2024-08-17 NOTE — NURSING NOTE
Report taken from Kayla SWEENEY. Patient awaiting social service consult. Patient moved to room 5 to lay on lounger to await consult.

## 2024-08-17 NOTE — NURSING NOTE
"Pt reports meth use today, states he is not going back to the home he was at. Pt states, \"Shit happened. He started throwing things and I'm not going back there. I'll go to a homeless shelter or [facility] or something, but I'm not going back.\"    Guardianship On Call worker, Michelle on phone providing consent. Michelle states if pt admitted, call back to notify and provide Psychiatrist name.    [phone]  "

## 2024-08-17 NOTE — NURSING NOTE
Patient laying on lounger. Touching his genitals. Staff informed patient that is inappropriate behavior and asked him to stop. Patient did this several times. Informed patient this was a hospital and that he needs to wait until he is in a more private setting.

## 2024-08-17 NOTE — NURSING NOTE
Contacted Dr. Barlow to report pt clinicals, labs, assessment findings. Per Dr. Barlow, no reason to admit pt at this time. Dr. Barlow requesting  consult d/t pt refusing to return to home with brother.

## 2024-08-17 NOTE — NURSING NOTE
Per Lead [facility] and House Supervisor marcus [place] to utilize [facility]. Called them and patient ready to be transported by them. DC at this time.

## 2024-08-17 NOTE — NURSING NOTE
Updated after hours guardianship via phone of DC plan and instructed them patient agreed and went back home with his brother Aric.    Instructed they will let his worker Coral Bourne know.     Contacted registration and had them update his guardianship information.

## 2024-11-18 ENCOUNTER — HOSPITAL ENCOUNTER (EMERGENCY)
Facility: HOSPITAL | Age: 39
Discharge: PSYCHIATRIC OR SUBSTANCE USE FACILITY | DRG: 885 | End: 2024-11-18
Attending: STUDENT IN AN ORGANIZED HEALTH CARE EDUCATION/TRAINING PROGRAM | Admitting: STUDENT IN AN ORGANIZED HEALTH CARE EDUCATION/TRAINING PROGRAM
Payer: MEDICARE

## 2024-11-18 ENCOUNTER — HOSPITAL ENCOUNTER (INPATIENT)
Facility: HOSPITAL | Age: 39
LOS: 2 days | Discharge: HOME | End: 2024-11-20
Attending: PSYCHIATRY & NEUROLOGY | Admitting: PSYCHIATRY & NEUROLOGY
Payer: MEDICARE

## 2024-11-18 VITALS
WEIGHT: 130.95 LBS | OXYGEN SATURATION: 95 % | BODY MASS INDEX: 19.85 KG/M2 | DIASTOLIC BLOOD PRESSURE: 74 MMHG | HEART RATE: 117 BPM | RESPIRATION RATE: 18 BRPM | HEIGHT: 68 IN | TEMPERATURE: 98.2 F | SYSTOLIC BLOOD PRESSURE: 112 MMHG

## 2024-11-18 DIAGNOSIS — R45.89 SUICIDAL BEHAVIOR WITHOUT ATTEMPTED SELF-INJURY: Primary | ICD-10-CM

## 2024-11-18 PROBLEM — B19.20 HEPATITIS C: Status: ACTIVE | Noted: 2024-11-18

## 2024-11-18 PROBLEM — R31.9 HEMATURIA: Status: ACTIVE | Noted: 2024-11-18

## 2024-11-18 PROBLEM — B19.10 HEPATITIS B: Status: ACTIVE | Noted: 2024-11-18

## 2024-11-18 PROBLEM — R45.851 SUICIDAL IDEATION: Status: ACTIVE | Noted: 2024-11-18

## 2024-11-18 LAB
ALBUMIN SERPL-MCNC: 3.7 G/DL (ref 3.5–5.2)
ALBUMIN/GLOB SERPL: 1.1 G/DL
ALP SERPL-CCNC: 67 U/L (ref 39–117)
ALT SERPL W P-5'-P-CCNC: 38 U/L (ref 1–41)
AMPHET+METHAMPHET UR QL: POSITIVE
AMPHETAMINES UR QL: POSITIVE
ANION GAP SERPL CALCULATED.3IONS-SCNC: 8.8 MMOL/L (ref 5–15)
AST SERPL-CCNC: 29 U/L (ref 1–40)
BARBITURATES UR QL SCN: NEGATIVE
BASOPHILS # BLD AUTO: 0.06 10*3/MM3 (ref 0–0.2)
BASOPHILS NFR BLD AUTO: 0.6 % (ref 0–1.5)
BENZODIAZ UR QL SCN: NEGATIVE
BILIRUB SERPL-MCNC: 0.2 MG/DL (ref 0–1.2)
BILIRUB UR QL STRIP: NEGATIVE
BILIRUB UR QL STRIP: NEGATIVE
BUN SERPL-MCNC: 16 MG/DL (ref 6–20)
BUN/CREAT SERPL: 23.2 (ref 7–25)
BUPRENORPHINE SERPL-MCNC: NEGATIVE NG/ML
CALCIUM SPEC-SCNC: 9.6 MG/DL (ref 8.6–10.5)
CANNABINOIDS SERPL QL: POSITIVE
CHLORIDE SERPL-SCNC: 103 MMOL/L (ref 98–107)
CLARITY UR: ABNORMAL
CLARITY UR: CLEAR
CO2 SERPL-SCNC: 29.2 MMOL/L (ref 22–29)
COCAINE UR QL: NEGATIVE
COLOR UR: YELLOW
COLOR UR: YELLOW
CREAT SERPL-MCNC: 0.69 MG/DL (ref 0.76–1.27)
DEPRECATED RDW RBC AUTO: 45.8 FL (ref 37–54)
EGFRCR SERPLBLD CKD-EPI 2021: 122.2 ML/MIN/1.73
EOSINOPHIL # BLD AUTO: 0.14 10*3/MM3 (ref 0–0.4)
EOSINOPHIL NFR BLD AUTO: 1.3 % (ref 0.3–6.2)
ERYTHROCYTE [DISTWIDTH] IN BLOOD BY AUTOMATED COUNT: 13.4 % (ref 12.3–15.4)
ETHANOL BLD-MCNC: <10 MG/DL (ref 0–10)
ETHANOL UR QL: <0.01 %
FENTANYL UR-MCNC: NEGATIVE NG/ML
GLOBULIN UR ELPH-MCNC: 3.3 GM/DL
GLUCOSE SERPL-MCNC: 108 MG/DL (ref 65–99)
GLUCOSE UR STRIP-MCNC: NEGATIVE MG/DL
GLUCOSE UR STRIP-MCNC: NEGATIVE MG/DL
HAV IGM SERPL QL IA: ABNORMAL
HBV CORE IGM SERPL QL IA: REACTIVE
HBV SURFACE AG SERPL QL IA: ABNORMAL
HCT VFR BLD AUTO: 42.3 % (ref 37.5–51)
HCV AB SER QL: REACTIVE
HGB BLD-MCNC: 13.6 G/DL (ref 13–17.7)
HGB UR QL STRIP.AUTO: NEGATIVE
HGB UR QL STRIP.AUTO: NEGATIVE
IMM GRANULOCYTES # BLD AUTO: 0.04 10*3/MM3 (ref 0–0.05)
IMM GRANULOCYTES NFR BLD AUTO: 0.4 % (ref 0–0.5)
KETONES UR QL STRIP: NEGATIVE
KETONES UR QL STRIP: NEGATIVE
LEUKOCYTE ESTERASE UR QL STRIP.AUTO: NEGATIVE
LEUKOCYTE ESTERASE UR QL STRIP.AUTO: NEGATIVE
LYMPHOCYTES # BLD AUTO: 3.69 10*3/MM3 (ref 0.7–3.1)
LYMPHOCYTES NFR BLD AUTO: 34.1 % (ref 19.6–45.3)
MAGNESIUM SERPL-MCNC: 2 MG/DL (ref 1.6–2.6)
MCH RBC QN AUTO: 29.8 PG (ref 26.6–33)
MCHC RBC AUTO-ENTMCNC: 32.2 G/DL (ref 31.5–35.7)
MCV RBC AUTO: 92.8 FL (ref 79–97)
METHADONE UR QL SCN: NEGATIVE
MONOCYTES # BLD AUTO: 0.75 10*3/MM3 (ref 0.1–0.9)
MONOCYTES NFR BLD AUTO: 6.9 % (ref 5–12)
NEUTROPHILS NFR BLD AUTO: 56.7 % (ref 42.7–76)
NEUTROPHILS NFR BLD AUTO: 6.13 10*3/MM3 (ref 1.7–7)
NITRITE UR QL STRIP: NEGATIVE
NITRITE UR QL STRIP: NEGATIVE
NRBC BLD AUTO-RTO: 0 /100 WBC (ref 0–0.2)
OPIATES UR QL: NEGATIVE
OXYCODONE UR QL SCN: NEGATIVE
PCP UR QL SCN: NEGATIVE
PH UR STRIP.AUTO: 6 [PH] (ref 5–8)
PH UR STRIP.AUTO: 7 [PH] (ref 5–8)
PLATELET # BLD AUTO: 281 10*3/MM3 (ref 140–450)
PMV BLD AUTO: 9.8 FL (ref 6–12)
POTASSIUM SERPL-SCNC: 4.2 MMOL/L (ref 3.5–5.2)
PROT SERPL-MCNC: 7 G/DL (ref 6–8.5)
PROT UR QL STRIP: NEGATIVE
PROT UR QL STRIP: NEGATIVE
QT INTERVAL: 358 MS
QTC INTERVAL: 430 MS
RBC # BLD AUTO: 4.56 10*6/MM3 (ref 4.14–5.8)
SODIUM SERPL-SCNC: 141 MMOL/L (ref 136–145)
SP GR UR STRIP: 1.02 (ref 1–1.03)
SP GR UR STRIP: 1.02 (ref 1–1.03)
TRICYCLICS UR QL SCN: NEGATIVE
UROBILINOGEN UR QL STRIP: ABNORMAL
UROBILINOGEN UR QL STRIP: NORMAL
WBC NRBC COR # BLD AUTO: 10.81 10*3/MM3 (ref 3.4–10.8)

## 2024-11-18 PROCEDURE — 99223 1ST HOSP IP/OBS HIGH 75: CPT | Performed by: PSYCHIATRY & NEUROLOGY

## 2024-11-18 PROCEDURE — 36415 COLL VENOUS BLD VENIPUNCTURE: CPT

## 2024-11-18 PROCEDURE — 80074 ACUTE HEPATITIS PANEL: CPT | Performed by: PSYCHIATRY & NEUROLOGY

## 2024-11-18 PROCEDURE — 85025 COMPLETE CBC W/AUTO DIFF WBC: CPT | Performed by: STUDENT IN AN ORGANIZED HEALTH CARE EDUCATION/TRAINING PROGRAM

## 2024-11-18 PROCEDURE — 93005 ELECTROCARDIOGRAM TRACING: CPT | Performed by: STUDENT IN AN ORGANIZED HEALTH CARE EDUCATION/TRAINING PROGRAM

## 2024-11-18 PROCEDURE — 99285 EMERGENCY DEPT VISIT HI MDM: CPT

## 2024-11-18 PROCEDURE — 81003 URINALYSIS AUTO W/O SCOPE: CPT | Performed by: PSYCHIATRY & NEUROLOGY

## 2024-11-18 PROCEDURE — 81003 URINALYSIS AUTO W/O SCOPE: CPT | Performed by: STUDENT IN AN ORGANIZED HEALTH CARE EDUCATION/TRAINING PROGRAM

## 2024-11-18 PROCEDURE — 93010 ELECTROCARDIOGRAM REPORT: CPT | Performed by: INTERNAL MEDICINE

## 2024-11-18 PROCEDURE — 83735 ASSAY OF MAGNESIUM: CPT | Performed by: STUDENT IN AN ORGANIZED HEALTH CARE EDUCATION/TRAINING PROGRAM

## 2024-11-18 PROCEDURE — 80307 DRUG TEST PRSMV CHEM ANLYZR: CPT | Performed by: STUDENT IN AN ORGANIZED HEALTH CARE EDUCATION/TRAINING PROGRAM

## 2024-11-18 PROCEDURE — 82077 ASSAY SPEC XCP UR&BREATH IA: CPT | Performed by: STUDENT IN AN ORGANIZED HEALTH CARE EDUCATION/TRAINING PROGRAM

## 2024-11-18 PROCEDURE — 80053 COMPREHEN METABOLIC PANEL: CPT | Performed by: STUDENT IN AN ORGANIZED HEALTH CARE EDUCATION/TRAINING PROGRAM

## 2024-11-18 RX ORDER — OLANZAPINE 5 MG/1
5 TABLET, ORALLY DISINTEGRATING ORAL 3 TIMES DAILY PRN
Status: DISCONTINUED | OUTPATIENT
Start: 2024-11-18 | End: 2024-11-20 | Stop reason: HOSPADM

## 2024-11-18 RX ORDER — ECHINACEA PURPUREA EXTRACT 125 MG
2 TABLET ORAL AS NEEDED
Status: DISCONTINUED | OUTPATIENT
Start: 2024-11-18 | End: 2024-11-20 | Stop reason: HOSPADM

## 2024-11-18 RX ORDER — MIRTAZAPINE 15 MG/1
15 TABLET, FILM COATED ORAL NIGHTLY
COMMUNITY
End: 2024-11-20 | Stop reason: HOSPADM

## 2024-11-18 RX ORDER — BENZTROPINE MESYLATE 1 MG/1
2 TABLET ORAL ONCE AS NEEDED
Status: DISCONTINUED | OUTPATIENT
Start: 2024-11-18 | End: 2024-11-20 | Stop reason: HOSPADM

## 2024-11-18 RX ORDER — HYDROXYZINE HYDROCHLORIDE 50 MG/1
50 TABLET, FILM COATED ORAL EVERY 6 HOURS PRN
Status: DISCONTINUED | OUTPATIENT
Start: 2024-11-18 | End: 2024-11-20 | Stop reason: HOSPADM

## 2024-11-18 RX ORDER — NICOTINE 21 MG/24HR
1 PATCH, TRANSDERMAL 24 HOURS TRANSDERMAL
Status: DISCONTINUED | OUTPATIENT
Start: 2024-11-18 | End: 2024-11-20 | Stop reason: HOSPADM

## 2024-11-18 RX ORDER — FAMOTIDINE 20 MG/1
20 TABLET, FILM COATED ORAL 2 TIMES DAILY PRN
Status: DISCONTINUED | OUTPATIENT
Start: 2024-11-18 | End: 2024-11-20 | Stop reason: HOSPADM

## 2024-11-18 RX ORDER — LOPERAMIDE HYDROCHLORIDE 2 MG/1
2 CAPSULE ORAL
Status: DISCONTINUED | OUTPATIENT
Start: 2024-11-18 | End: 2024-11-20 | Stop reason: HOSPADM

## 2024-11-18 RX ORDER — ONDANSETRON 4 MG/1
4 TABLET, ORALLY DISINTEGRATING ORAL EVERY 6 HOURS PRN
Status: DISCONTINUED | OUTPATIENT
Start: 2024-11-18 | End: 2024-11-20 | Stop reason: HOSPADM

## 2024-11-18 RX ORDER — BENZTROPINE MESYLATE 1 MG/ML
1 INJECTION, SOLUTION INTRAMUSCULAR; INTRAVENOUS ONCE AS NEEDED
Status: DISCONTINUED | OUTPATIENT
Start: 2024-11-18 | End: 2024-11-20 | Stop reason: HOSPADM

## 2024-11-18 RX ORDER — MIRTAZAPINE 15 MG/1
15 TABLET, FILM COATED ORAL NIGHTLY
Status: CANCELLED | OUTPATIENT
Start: 2024-11-18

## 2024-11-18 RX ORDER — OLANZAPINE 5 MG/1
5 TABLET ORAL ONCE
Status: COMPLETED | OUTPATIENT
Start: 2024-11-18 | End: 2024-11-18

## 2024-11-18 RX ORDER — BENZONATATE 100 MG/1
100 CAPSULE ORAL 3 TIMES DAILY PRN
Status: DISCONTINUED | OUTPATIENT
Start: 2024-11-18 | End: 2024-11-20 | Stop reason: HOSPADM

## 2024-11-18 RX ORDER — POLYETHYLENE GLYCOL 3350 17 G/17G
17 POWDER, FOR SOLUTION ORAL DAILY PRN
Status: DISCONTINUED | OUTPATIENT
Start: 2024-11-18 | End: 2024-11-20 | Stop reason: HOSPADM

## 2024-11-18 RX ORDER — ACETAMINOPHEN 325 MG/1
650 TABLET ORAL EVERY 6 HOURS PRN
Status: DISCONTINUED | OUTPATIENT
Start: 2024-11-18 | End: 2024-11-20 | Stop reason: HOSPADM

## 2024-11-18 RX ORDER — ALUMINA, MAGNESIA, AND SIMETHICONE 2400; 2400; 240 MG/30ML; MG/30ML; MG/30ML
15 SUSPENSION ORAL EVERY 6 HOURS PRN
Status: DISCONTINUED | OUTPATIENT
Start: 2024-11-18 | End: 2024-11-20 | Stop reason: HOSPADM

## 2024-11-18 RX ORDER — TRAZODONE HYDROCHLORIDE 50 MG/1
50 TABLET, FILM COATED ORAL NIGHTLY PRN
Status: DISCONTINUED | OUTPATIENT
Start: 2024-11-18 | End: 2024-11-20 | Stop reason: HOSPADM

## 2024-11-18 RX ORDER — IBUPROFEN 400 MG/1
400 TABLET, FILM COATED ORAL EVERY 6 HOURS PRN
Status: DISCONTINUED | OUTPATIENT
Start: 2024-11-18 | End: 2024-11-20 | Stop reason: HOSPADM

## 2024-11-18 RX ADMIN — HYDROXYZINE HYDROCHLORIDE 50 MG: 50 TABLET, FILM COATED ORAL at 03:00

## 2024-11-18 RX ADMIN — TRAZODONE HYDROCHLORIDE 50 MG: 50 TABLET ORAL at 03:01

## 2024-11-18 RX ADMIN — OLANZAPINE 5 MG: 5 TABLET, FILM COATED ORAL at 03:00

## 2024-11-18 NOTE — PROGRESS NOTES
Navigator is helping with the following referral:    [facility] - [phone]  -No answer. 11/18  -Not in network with patients insurance. He states to try [facility] [phone].  11/18
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
## 2024-11-18 NOTE — NURSING NOTE
Spoke to Dr. Burt, discussed assessment and labs, new orders to admit the patient to adult with routine orders SP3. Zyprexa 5mg once tonight.  [id number]

## 2024-11-18 NOTE — PLAN OF CARE
Problem: Adult Behavioral Health Plan of Care  Goal: Plan of Care Review  Outcome: Progressing  Flowsheets (Taken 11/18/2024 1040)  Consent Given to Review Plan with: Guardiernestine Moncada  Progress: improving  Patient Agreement with Plan of Care: agrees  Outcome Evaluation: New admit. Completed social history and integrated summary  Plan of Care Reviewed With:   patient   guardian  Goal: Patient-Specific Goal (Individualization)  Outcome: Progressing  Flowsheets  Taken 11/18/2024 1040  Patient/Family-Specific Goals (Include Timeframe): Patient will deny SI/HI/AVH in 1-4 days. Patient will identify 1-4 healthy coping skills as alternative to drug use in 1-4 days. Patient will identify 1-2 positive reasons to be compliant with medication in 1-7 days. Patient will demonstrate improved group attendance and peer interaction in 1-7 days.  Individualized Care Needs: Therapist will offer 1-4 individual sessions focusing on CBT/DBT concepts for relapse prevention.  Therapist will offer guardian education and safety planning.  Therapist will offer [facility] referral prior to discharge  Anxieties, Fears or Concerns: None reported  Taken 11/18/2024 1033  Patient Personal Strengths:   resourceful   resilient   community support  Patient Vulnerabilities:   family/relationship conflict   history of unsuccessful treatment   housing insecurity   lacks insight into illness   poor impulse control   limited support system   limited social skills   limited ability to read/write   recent loss   substance abuse/addiction  Goal: Optimized Coping Skills in Response to Life Stressors  Outcome: Progressing  Intervention: Promote Effective Coping Strategies  Flowsheets (Taken 11/18/2024 1040)  Supportive Measures:   active listening utilized   counseling provided  Goal: Develops/Participates in Therapeutic Sayre to Support Successful Transition  Outcome: Progressing  Intervention: Foster Therapeutic Sayre  Flowsheets (Taken 11/18/2024 0900 by  Yahir Perez RN)  Trust Relationship/Rapport:   choices provided   care explained   emotional support provided   questions answered   empathic listening provided   questions encouraged   thoughts/feelings acknowledged   reassurance provided  Intervention: Mutually Develop Transition Plan  Flowsheets  Taken 11/18/2024 1040  Outpatient/Agency/Support Group Needs: residential services  Transition Support:   community resources reviewed   follow-up care coordinated   crisis management plan promoted   crisis management plan verbalized   follow-up care discussed  Anticipated Discharge Disposition: residential substance use unit  Taken 11/18/2024 1038  Discharge Coordination/Progress: Patient has Medicare AB and [place] Medicaid. Patient's guardian signed consent for [facility] referrals  Concerns Comments: NA  Transportation Anticipated:   [employer] transportation   agency  Transportation Concerns: none  Current Discharge Risk:   psychiatric illness   substance use/abuse  Concerns to be Addressed:   substance/tobacco abuse/use   cognitive/perceptual   suicidal   homelessness   medication   mental health   discharge planning   care coordination/care conferences   coping/stress   compliance issue  Readmission Within the Last 30 Days: no previous admission in last 30 days  Patient/Family Anticipated Services at Transition: rehabilitation services  Patient's Choice of Community Agency(s): [facility] sober living  Patient/Family Anticipates Transition to: inpatient rehabilitation facility  Offered/Gave Vendor List: no   Goal Outcome Evaluation:  Plan of Care Reviewed With: patient, guardian  Patient Agreement with Plan of Care: agrees  Consent Given to Review Plan with: Guardian Coral  Progress: improving  Outcome Evaluation: New admit. Completed social history and integrated summary

## 2024-11-18 NOTE — PROGRESS NOTES
0928      DATA:      Therapist met individually with patient this date to introduce role and to discuss hospitalization expectations. Patient agreeable. Reviewed medical record and staffed case with treatment team this date. No major issues identified.       Clinical Maneuvering/Intervention:     Therapist assisted patient in processing above session content; acknowledged and normalized patient’s thoughts, feelings, and concerns.  Discussed the therapist/patient relationship and explain the parameters and limitations of relative confidentiality.  Also discussed the importance of active participation, and honesty to the treatment process.  Encouraged the patient to discuss/vent their feelings, frustrations, and fears concerning their ongoing medical issues and validated their feelings.     Allowed patient to freely discuss issues without interruption or judgment. Provided safe, confidential environment to facilitate the development of positive therapeutic relationship and encourage open, honest communication.      Concern was voiced regarding substance use and educated patient on risks associated with use. Patient was advised to abstain from use and educated on community resources that can help with sobriety and recovery.  State guardian consents for [facility] treatment referrals this date.     Therapist addressed discharge safety planning this date. Assisted patient in identifying risk factors which would indicate the need for higher level of care after discharge;  including thoughts to harm self or others and/or self-harming behavior. Encouraged patient to call [phone],  [phone], or present to the nearest emergency room should any of these events occur. Discussed crisis intervention services and means to access.  Encouraged securing any objects of harm.       Therapist completed integrated summary, treatment plan, and initiated social history this date.  Therapist is strongly encouraging family involvement in treatment.      "  ASSESSMENT:      The patient is a 39 year old  male.  Patient is a 10th grade, SSD recipient and has a state guardian.  He reports that he was last living with his brother Aric Mims, but now refuses to return. Patient has history of multiple admissions here, last being August 2024 and being diagnosed with Methamphetamine abuse, Borderline Intellectual Disability, Other recurrent depressive disorders, ADHD and Brief psychotic disorder. Per ER report, \"Patient presented to intake after calling police and requesting to come to the [facility] because he was \"thinking about ending it all.\" Patient difficult to understand and is a poor historian, so obtaining information about current situation is difficult, but was able to gather that he is suicidal with a plan to jump off a bridge. He denied HI. Reported visual hallucinations of seeing a man who vanishes. I was also able to gather that his mother and father passed away and he has been in state guardianship since then, and has not been receiving his state checks. According to his history, he does have a history of suicide attempts, schizophrenia, bipolar disorder, and a borderline intellectual disability.\"    Today, patient was seen 1-1 in the office. Patient restless, cooperative, and oriented to person, place, and situation, date.  Patient observed to have appropriate affect, coherent thought content, and linear thought processes.  Patent familiar to therapist due to his multiple admissions. Patient  a poor historian. He does state that he has intermittent SI. He denies HI.  He endorses AH, but does not elaborate.  He reports that he lives with his brother Aric Mims, but refuses to return. He states that was kicked out over pot use.  He reports that he wants to go to [facility] sober living in [facility].   Patient's UDS + for THC, Methamphetamine and Amphetamine.  Patient does admit to Methamphetamine and THC use.      Therapist " contacted state guardian Coral Bourne at [phone]; Coral was contacted this date.  Coral reports that the patient has been kicked out by his brother. She reports the issue is that patient stops his medicines, masturbates with out a door on his bedroom, and uses substances.  There is a small child in the home and patient's brother does not want him to return for these reasons. Coral reports that she supports [facility] treatment and gives verbal consent for [facility] referral as well as residential and other sober living referrals if needed.       PLAN:       Patient to remain hospitalized this date.      Treatment team will focus efforts on stabilizing patient's acute symptoms while providing education on healthy coping and crisis management to reduce hospitalizations.   Patient requires daily psychiatrist evaluation and 24/7 nursing supervision to promote patient  safety.     Therapist will offer 1-4 individual sessions, family education, and appropriate referral.     Therapist recommends continued stabilization. State guardian Coral consents to [facility] treatment referrals this date. BH Navigator please start with [facility] and patient will likely be ready for phone assessments tomorrow. Therapist will update state guardian as needed and prior to discharge.
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
## 2024-11-18 NOTE — PLAN OF CARE
Goal Outcome Evaluation:  Plan of Care Reviewed With: patient  Plan of Care Reviewed With: patient  Patient Agreement with Plan of Care: agrees     Progress: no change  Outcome Evaluation: Pt new admit reports Anx  10 Dep 10 thoughts of SI to jump off bridge patient loud hyperverbal difficult to understand at times due to speach impediment. States her hears voices and sees people that vanish He mentions hoping to attend sober living facility in [place] if possible

## 2024-11-18 NOTE — PLAN OF CARE
Problem: Adult Behavioral Health Plan of Care  Goal: Plan of Care Review  Outcome: Progressing  Flowsheets  Taken 11/18/2024 1229 by Yahir Perez, RN  Progress: improving  Outcome Evaluation: PATIENTS SPEECH DEFECT MAKES ASSESSMENT DIFFICULT BUT PATIENT DOES NOT DISPLAY ANY S/S OF ACUTE DISTRESS. PATIENT IS EATING AND DRINKING, INTERSCTS WELL WITH OTHERS, NEW ORDERS: ZYPREXA 5MG, URINALYSIS  Plan of Care Reviewed With: patient  Taken 11/18/2024 1040 by Livia Borja  Patient Agreement with Plan of Care: agrees  Taken 11/18/2024 0900 by Yahir Perez, RN  Patient Agreement with Plan of Care: agrees   Goal Outcome Evaluation:  Plan of Care Reviewed With: patient  Plan of Care Reviewed With: patient  Patient Agreement with Plan of Care: agrees     Progress: improving  Outcome Evaluation: PATIENTS SPEECH DEFECT MAKES ASSESSMENT DIFFICULT BUT PATIENT DOES NOT DISPLAY ANY S/S OF ACUTE DISTRESS. PATIENT IS EATING AND DRINKING, INTERSCTS WELL WITH OTHERS, NEW ORDERS: ZYPREXA 5MG, URINALYSIS

## 2024-11-18 NOTE — NURSING NOTE
"Patient presented to intake after calling police and requesting to come to the [facility] because he was \"thinking about ending it all.\" Patient difficult to understand and is a poor historian, so obtaining information about current situation is difficult, but was able to gather that he is suicidal with a plan to jump off a bridge. He denied HI. Reported visual hallucinations of seeing a man who vanishes. I was also able to gather that his mother and father passed away and he has been in state guardianship since then, and has not been receiving his state checks.     According to his history, he does have a history of suicide attempts, schizophrenia, bipolar disorder, and a borderline intellectual disability.   "

## 2024-11-18 NOTE — NURSING NOTE
Consent for admission, as well as PRN and scheduled medications, including Zyprexa 5mg, provided by Elsy Vivar, on call state guardianship

## 2024-11-19 PROCEDURE — 99232 SBSQ HOSP IP/OBS MODERATE 35: CPT | Performed by: PSYCHIATRY & NEUROLOGY

## 2024-11-19 NOTE — PLAN OF CARE
Goal Outcome Evaluation:  Plan of Care Reviewed With: patient  Plan of Care Reviewed With: patient  Patient Agreement with Plan of Care: agrees     Progress: improving  Outcome Evaluation: Pt reports anxiety 6/10. Pt denies depression. Pt is minimally cooperative with assessment. Pt only rates anxiety and depression and then walks away. When asked if he has any complaints about anything, pt mumbles and walks away again.

## 2024-11-19 NOTE — PROGRESS NOTES
Navigator is helping with the following referral:     [facility] - [phone]  -No answer. 11/18  -Not in network with patients insurance. He states to try [facility] [phone].  11/18    [facility] - [phone]  -Spoke with [facility]. They can not accept a client with a guardian.  11/19    [facility] [phone]   -Staff states they only provide outpatient services.  11/19    [facility] - [phone]  -Sent 11/19

## 2024-11-19 NOTE — PLAN OF CARE
Problem: Adult Behavioral Health Plan of Care  Goal: Plan of Care Review  Outcome: Progressing  Flowsheets  Taken 11/19/2024 1312 by Yahir Perez, RN  Outcome Evaluation: PATIENT VERBALIZES SEVERE ANXIETY AND DEPRESSION, SI WITH NO PLAN AS ONLY PPROBLEMS THIS SHIFT. PATIENT IS AOX3, COOPERTAIVE WITH NO S/S OF ACUTE DISTRESS NOTED NNO NOTED  Taken 11/19/2024 0759 by Yahir Perez, RN  Patient Agreement with Plan of Care: agrees  Taken 11/19/2024 0237 by Lizett Epps, RN  Progress: improving  Plan of Care Reviewed With: patient   Goal Outcome Evaluation:  Plan of Care Reviewed With: patient  Plan of Care Reviewed With: patient  Patient Agreement with Plan of Care: agrees     Progress: improving  Outcome Evaluation: PATIENT VERBALIZES SEVERE ANXIETY AND DEPRESSION, SI WITH NO PLAN AS ONLY PPROBLEMS THIS SHIFT. PATIENT IS AOX3, COOPERTAIVE WITH NO S/S OF ACUTE DISTRESS NOTED NNO NOTED

## 2024-11-19 NOTE — PLAN OF CARE
Problem: Adult Behavioral Health Plan of Care  Goal: Optimized Coping Skills in Response to Life Stressors  Outcome: Progressing  Intervention: Promote Effective Coping Strategies  Flowsheets (Taken 11/19/2024 0838 by Yahir Perez, RN)  Supportive Measures:   counseling provided   active listening utilized   positive reinforcement provided   verbalization of feelings encouraged   self-responsibility promoted      0840    DATA:    Therapist met with the patient individually. Therapist continues reviewing plan of care and aftercare plan.  The patient was agreeable.    ASSESSMENT:    Patient was seen for follow up of  Brief psychotic disorder. Other recurrent depressive disorders. Suicidal ideation. Borderline intellectual disability. Methamphetamine abuse. Tetrahydrocannabinol (THC) use disorder, mild, abuse. ADHD (attention deficit hyperactivity disorder)    Today, patient was seen 1-1 at bedside. Patient calm, cooperative, and oriented to person, place, situation.  Patient noted to have congruent affect, congruent mood, coherent thought content, linear thought processes.  Patient denies SI/HI/AVH.  He rates depression/anxiety at 5/10. Patient reports fatigue, likely related to Methamphetamine crash.  He continues to mumble, and is somewhat guarded.  He is aware that therapist has gained verbal consent from state guardian to attempt [facility] referrals.     Therapist and  navigator running into barriers with patient's [facility] referral. 1. Patient has Medicare AB and [place] [employer] which is puzzling.  This in itself will limit referrals. However, some places will also deny patient because he has a state guardian.  Thus far, he has been denied at [facility] because of insurance and [facility] because of having a guardian.   Navigator trying [facility] in [place] next. We may have to discuss this issue with state guardian Coral and pivot towards personal care home or shelter if we continue to run into this issue.       CLINICAL MANEUVERING:    Therapist provided safe, secure environment for patient to share.  Provided reflective listening and psychoeducation.  Assisted patient in processing the above session. Assisted patient in identifying any questions or needs to be addressed today.        Concern was voiced regarding substance use and educated patient on risks associated with use. Patient was advised to abstain from use and educated on community resources that can help with sobriety and recovery.  State guardian Coral gives consent to attempt [facility] referrals.     Therapist continues to staff with Dr. Lozada RN staff, patient, State guardian as needed.       Plan:     Patient to remain hospitalized this date.      Treatment team will focus efforts on stabilizing patient's acute symptoms while providing education on healthy coping and crisis management to reduce hospitalizations.   Patient requires daily psychiatrist evaluation and 24/7 nursing supervision to promote patient  safety.     Therapist will offer 1-4 individual sessions, family education, and appropriate referral.     Therapist recommends continued assessment.  [facility] referrals being attempted this date.
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
## 2024-11-19 NOTE — PROGRESS NOTES
"INPATIENT PSYCHIATRIC PROGRESS NOTE    Name:  Cruz Mims  :  1985  MRN:  [id number]  Visit Number:  [id number]  Length of stay:  1    Behavioral Health Treatment Plan and Problem List: I have reviewed and approved the Behavioral Health Treatment Plan and Problem list.    SUBJECTIVE    CC/ Focus of exam: Psychosis/methamphetamine abuse/borderline intellect    Patient's subjective status: \"I am doing okay\"    INTERVAL HISTORY: The patient is alert this morning, affect appropriate, free of any demonstrated formal thought disorder, pleasant enough so as to seek alternatives to the hospital.  Staff running into obstacles regarding a safe disposition plan, hopefully can work in concert with the state guardian towards his discharge from the hospital.  Patient reiterates that he cannot return to his brother's household in [facility] and he has no other viable supportive options.  Right now a residential CD program best option if can get around the insurance and guardianship issue.    Depression rating says not  Anxiety rating says not  Hopelessness: Says not  Sleep: 7 hours  Withdrawal sx: None  Craving: Says not       ROS: No cardiovascular, GI, or Neurological complaints.   Repeat UA clear.  Has a follow-up appointment scheduled with the hepatitis clinic on .    OBJECTIVE    Vitals:    24 0723   BP: 117/75   Pulse: 115   Resp: 18   Temp: 98 °F (36.7 °C)   SpO2: 97%      Temp:  [97 °F (36.1 °C)-98 °F (36.7 °C)] 98 °F (36.7 °C)  Heart Rate:  [] 115  Resp:  [18] 18  BP: ()/(53-75) 117/75    MENTAL STATUS EXAM:       Psychomotor: No psychomotor agitation/retardation, No EPS, No motor tics  Speech-normal rate, amount.  Mood/Affect: Appropriate  Thought Processes: coherent  Thought Content: normal  Hallucination(s): none  Hopelessness: No  Optimistic: minimally  Suicidal Thoughts: No  Suicidal Plan/Intent: No  Homicidal Thoughts: absent  Orientation: oriented x 3  Memory: recent " intact    Lab Results (last 24 hours)       Procedure Component Value Units Date/Time    Urinalysis With Microscopic If Indicated (No Culture) - Urine, Clean Catch [[id number]]  (Abnormal) Collected: 11/18/24 2045    Specimen: Urine, Clean Catch Updated: 11/18/24 2252     Color, UA Yellow     Appearance, UA Cloudy     pH, UA 7.0     Specific Gravity, UA 1.021     Glucose, UA Negative     Ketones, UA Negative     Bilirubin, UA Negative     Blood, UA Negative     Protein, UA Negative     Leuk Esterase, UA Negative     Nitrite, UA Negative     Urobilinogen, UA 1.0 E.U./dL    Narrative:      Urine microscopic not indicated.             Imaging Results (Last 24 Hours)       ** No results found for the last 24 hours. **             Lab and x-ray studies reviewed.    ECG/EMG Results (most recent)       None             ALLERGIES: Amoxicillin, Penicillins, and Venlafaxine    Medications:     nicotine, 1 patch, Transdermal, Q24H       ASSESSMENT & PLAN     Brief psychotic disorder  Olanzapine plus the supportive individual and group psychotherapeutic effort     Other recurrent depressive disorders  Evaluate prospectively prior to initiating medication plus the supportive psychotherapeutic effort       Suicidal ideation  Patient is on precautions       Borderline intellectual disability  Incorporate into the supportive effort and disposition planning effort.       Methamphetamine abuse  Once again be encouraging patient initiating recovery effort for preferably with residential treatment post hospital       Tetrahydrocannabinol (THC) use disorder, mild, abuse  Same           ADHD (attention deficit hyperactivity disorder)  We will hold on medications for now.      Special precautions: Special Precautions Level 3 (q15 min checks)     Behavioral Health Treatment Plan and Problem List: I have reviewed and approved the Behavioral Health Treatment Plan and Problem list.    I spent a total of 26 minutes in direct patient care  including  17 minutes face to face with the patient assessment, coordination of care, and counseling the patient on the current and follow-up treatment plans regarding his status and situation.  Patient had no additional questions.     SHERLYN Lozada MD    Clinician:  Carlton Lozada MD  11/19/24  09:23 EST    Dictated utilizing Dragon dictation

## 2024-11-20 VITALS
HEIGHT: 65 IN | HEART RATE: 107 BPM | TEMPERATURE: 97.1 F | RESPIRATION RATE: 18 BRPM | DIASTOLIC BLOOD PRESSURE: 79 MMHG | WEIGHT: 134 LBS | SYSTOLIC BLOOD PRESSURE: 122 MMHG | BODY MASS INDEX: 22.33 KG/M2 | OXYGEN SATURATION: 96 %

## 2024-11-20 PROCEDURE — 99239 HOSP IP/OBS DSCHRG MGMT >30: CPT | Performed by: PSYCHIATRY & NEUROLOGY

## 2024-11-20 RX ORDER — OLANZAPINE 5 MG/1
5 TABLET ORAL NIGHTLY
Qty: 30 TABLET | Refills: 2 | Status: SHIPPED | OUTPATIENT
Start: 2024-11-20 | End: 2024-11-20

## 2024-11-20 NOTE — NURSING NOTE
Patient discharging to [facility] per Therapist, Dr. Lozada aware no appointment scheduled at suboxone clinic , order discontinued

## 2024-11-20 NOTE — NURSING NOTE
Reviewed discharge, follow up with Patient and Coral Bourne, State Guardian [phone], verbalized understanding, granted consent. Per Guardian Patient will be discharged to [facility] who will be providing transportation, Therapist aware, Witnessed by Kandy Baca

## 2024-11-20 NOTE — PROGRESS NOTES
0900    Therapist staffed with Dr. Lozada this morning. He has placed a discharge order as patient is requesting discharge and does not appear to meet criteria for involuntary hold or transfer.     Therapist met 1-1 in the office. Patient calm/cooperative, oriented x person, place, situation, month/year.  Patient noted to have appropriate affect, congruent mood, normal thought content, linear thought content. Patient denies SI/HI/AVH and acute symptoms.   Patient initially reported that he wanted to go home with his brother and sorted the details out. However, therapist attempted 5x phone calls to Aric Mims at [phone] with out success. Patient apparently told Dr. Lozada he could also go to a cousin's house down the road, but patient can not give therapist a contact or details.  Therapist contacted [facility] jailyn Bourne at [phone]; she agrees that it would be best for patient to go to rehab and can not confirm his story of going to brother's house as last they spoke patient can not go there. Patient asked to go to [facility]-but they are closed to [place] of [place] residents at this time. Patient ultimately stated that he would go to [facility].  He reports that he can read and write.  navigator faxed guardianship papers to [facility] liaison Monica. Therapist faxed verbal consent from state jailyn Moncada to Monica as well.     Therapist has informed  Navigator to let [facility] Monica know that patient is reporting he can read and write, we do not know an IQ for patient, and that patient has attended groups here in the past and participated.  Guardian consents to the referral and we do have guardianship papers.     State jailyn Moncada was educated that it may be best to assist patient in switching his secondary insurance back to [place] medicaid.  We talked about [facility] referrals, however patient would likely not stay there as well plus his FERNANDO diagnosis will likely be barrier to any accepting facility.  Coral was  educated about Reed's Law, seeking an SCL waiver for patient.  Coral reports that she does not have an IQ on file for patient.  Coral reports that any program patient would go at this time, he is likely to leave.  Coral verbalized understanding.      Concern was voiced regarding substance use and educated patient on risks associated with use. Patient was advised to abstain from use and educated on community resources that can help with sobriety and recovery.  Patient's guardian Coral Bourne provides verbal consent for [facility] referral and discharge today.       Assisted patient in identifying risk factors which would indicate the need for higher level of care including thoughts to harm self or others and/or self-harming behavior and encouraged patient to call 988, call 911, or present to the nearest emergency room should any of these events occur. Discussed crisis intervention services and means to access.  Patient adamantly and convincingly denies current suicidal or homicidal ideation or perceptual disturbance.    Therapist completed the following safety plan with the patient       SAFETY/MENTAL HEALTH PLAN    1. Recognizing warning signs: Warning signs that a crisis may be developing such as thoughts, images, mood, situation, behaviors:     Feeling down and out    2. Internal coping strategies: Things that the patient can do to take their mind off problems without contacting another person such as relaxation techniques, physical activity, etc:    Like listening to music. Play ball      3. Socialization strategies for distraction and support: People and social settings that provide distraction or support -       Get to know people at the rehab I guess       4. Social contact for assistance in resolving crisis: People the patient can ask for help - names and telephone:    Brothrylee Corbett      5. Professionals or agencies contacts to help resolve crisis: Professionals or agencies the patient can contact during a crisis -  clinician name/location/phone/emergency contact number, local urgent care services with address/phone, National Suicide Prevention Lifeline (988), Emergency contact 911:       Patient educated about 988 and reports that he would tell rehab staff to bring me.     6. Means restriction: Ways to make the environment safe     Patient denies access to firearms, weapons, medications.  Going to [facility] rehab.

## 2024-11-20 NOTE — DISCHARGE SUMMARY
Date of Discharge:  11/20/2024    Discharge Diagnosis:Principal Problem:    Brief psychotic disorder  Active Problems:   Other recurrent depressive disorders    Borderline intellectual disability    ADHD (attention deficit hyperactivity disorder)    Methamphetamine abuse    Tetrahydrocannabinol (THC) use disorder, mild, abuse    Hepatitis C    Hepatitis B          Presenting Problem/History of Present Illness:Patient was admitted to the hospital on November 18 having presented psychotic threatening self-harm, voluntarily admitted for safety evaluation and treatment, see admission note for details.         Hospital Course:      Patient was admitted for safety and stabilization and was placed on standard precautions.  Routine labs were checked.  Patient was assigned a master's level therapist and provided with an opportunity to participate in group and individual therapy on the unit.  Patient seen on a daily basis for evaluation and supportive therapy.  Patient's dysphoria and suspect psychotic symptomatology cleared rapidly as did his thoughts of harming self or others.  Patient rested well was calm and cooperative by his second day of hospitalization.  Patient initially requesting referral to a residential chemical dependency treatment program but on the morning of November 20 stated he wanted to be discharged, that he was planninh to return to his brother's household or possibly a cousins home in [facility].  Patient subsequently accepted referral to the [facility] residential program.  Patient was given appointment at the hepatitis clinic for December 11.  By the conclusion of this hospitalization, patient is exhibiting no acutely concerning symptoms of mood, psychotic or thought disorder that would necessitate further inpatient care. Patient is also denying SI, HI, and AVH. Patient has shown improvement of presenting symptoms, exhibited no behavior concerning for harm to self or others, and is considered  appropriate for discharge to a lower level of care today. Treatment and safe discharge planning completed. See below for prescriptions and follow-up appointments.    Consults:   Consults       No orders found from 10/20/2024 to 11/19/2024.            Labs:  Lab Results (all)       Procedure Component Value Units Date/Time    Urinalysis With Microscopic If Indicated (No Culture) - Urine, Clean Catch [[id number]]  (Abnormal) Collected: 11/18/24 2045    Specimen: Urine, Clean Catch Updated: 11/18/24 2252     Color, UA Yellow     Appearance, UA Cloudy     pH, UA 7.0     Specific Gravity, UA 1.021     Glucose, UA Negative     Ketones, UA Negative     Bilirubin, UA Negative     Blood, UA Negative     Protein, UA Negative     Leuk Esterase, UA Negative     Nitrite, UA Negative     Urobilinogen, UA 1.0 E.U./dL    Narrative:      Urine microscopic not indicated.    Hepatitis Panel, Acute [[id number]]  (Abnormal) Collected: 11/18/24 0526    Specimen: Blood Updated: 11/18/24 0718     Hepatitis B Surface Ag Non-Reactive     Hep A IgM Non-Reactive     Hep B C IgM Reactive     Hepatitis C Ab Reactive    Narrative:      Results may be falsely decreased if patient taking Biotin.             Imaging:  Imaging Results (All)       None            Condition on Discharge:  improved    Prognosis: Concerning    Vital Signs  Temp:  [97.1 °F (36.2 °C)-98.2 °F (36.8 °C)] 97.1 °F (36.2 °C)  Heart Rate:  [102-107] 107  Resp:  [16-18] 18  BP: (122-126)/(79-82) 122/79    Discharge Disposition  Home or Self Care       Discharge Medications        New Medications        Instructions Start Date   OLANZapine 5 MG tablet  Commonly known as: ZyPREXA   5 mg, Oral, Nightly             Stop These Medications      mirtazapine 15 MG tablet  Commonly known as: REMERON              Discharge Diet: Regular    Activity at Discharge: No restrictions    Follow-up Appointments:  DEC    11 [facility] HEPATITIS C Management  Wednesday Dec 11, 2024 10:00 AM [facility]  [facility]  [address] [facility] [id number]  [phone]         Additional Information  [facility] [place]  [id number], [place] [address] [phone]     11/20/2024           Carlton Lozada MD  11/20/24  07:46 EST  .    Time: I spent greater than 30 minutes on this discharge activity which included: face-to-face encounter with the patient, reviewing the data in the system, coordination of the care with the nursing staff as well as consultants, documentation, and entering orders.      Dictated utilizing Dragon dictation

## 2024-11-20 NOTE — PLAN OF CARE
Goal Outcome Evaluation:  Plan of Care Reviewed With: patient  Plan of Care Reviewed With: patient  Patient Agreement with Plan of Care: agrees     Progress: improving  Outcome Evaluation: Pt denies anxiety, depression, SI/HI/AVH. Pt reports good sleep and appetite.

## 2024-11-20 NOTE — PROGRESS NOTES
"1243:     Both state guardian Coral and patient have been educated that he will be admitting to [facility] via their transport. Both agreeable. Patient reports that he got a hold of his brother Aric and Aric told him that he can't come back there. Patient states, \"I'd rather go to rehab anyway.\"  Dr. Lozada updated that patient will not be going home with a suboxone referral, but will got to [facility].    "

## 2024-11-20 NOTE — PROGRESS NOTES
Navigator is helping with the following referral:    [facility] - [phone]  -Sent 11/19  -Patient approved. Bed available today at [place] location.  at 1:30pm.    11/20

## 2024-11-20 NOTE — PLAN OF CARE
Goal Outcome Evaluation:        Patient is discharging today.

## 2025-01-07 ENCOUNTER — HOSPITAL ENCOUNTER (INPATIENT)
Facility: HOSPITAL | Age: 40
LOS: 8 days | Discharge: NURSING, REHAB OR LONG-TERM CARE | DRG: 885 | End: 2025-01-15
Attending: PSYCHIATRY & NEUROLOGY | Admitting: PSYCHIATRY & NEUROLOGY
Payer: MEDICARE

## 2025-01-07 ENCOUNTER — HOSPITAL ENCOUNTER (EMERGENCY)
Facility: HOSPITAL | Age: 40
Discharge: STILL ADMITTED OR ADMINISTRATIVE | DRG: 885 | End: 2025-01-07
Attending: STUDENT IN AN ORGANIZED HEALTH CARE EDUCATION/TRAINING PROGRAM
Payer: MEDICARE

## 2025-01-07 VITALS
OXYGEN SATURATION: 97 % | TEMPERATURE: 97 F | SYSTOLIC BLOOD PRESSURE: 137 MMHG | WEIGHT: 128 LBS | RESPIRATION RATE: 18 BRPM | HEIGHT: 68 IN | HEART RATE: 94 BPM | DIASTOLIC BLOOD PRESSURE: 67 MMHG | BODY MASS INDEX: 19.4 KG/M2

## 2025-01-07 DIAGNOSIS — R45.851 SUICIDAL IDEATIONS: Primary | ICD-10-CM

## 2025-01-07 LAB
ALBUMIN SERPL-MCNC: 3.7 G/DL (ref 3.5–5.2)
ALBUMIN/GLOB SERPL: 1.1 G/DL
ALP SERPL-CCNC: 57 U/L (ref 39–117)
ALT SERPL W P-5'-P-CCNC: 28 U/L (ref 1–41)
AMPHET+METHAMPHET UR QL: POSITIVE
AMPHETAMINES UR QL: POSITIVE
ANION GAP SERPL CALCULATED.3IONS-SCNC: 11.2 MMOL/L (ref 5–15)
AST SERPL-CCNC: 27 U/L (ref 1–40)
BARBITURATES UR QL SCN: NEGATIVE
BASOPHILS # BLD AUTO: 0.03 10*3/MM3 (ref 0–0.2)
BASOPHILS NFR BLD AUTO: 0.3 % (ref 0–1.5)
BENZODIAZ UR QL SCN: NEGATIVE
BILIRUB SERPL-MCNC: 0.4 MG/DL (ref 0–1.2)
BILIRUB UR QL STRIP: NEGATIVE
BUN SERPL-MCNC: 14 MG/DL (ref 6–20)
BUN/CREAT SERPL: 19.7 (ref 7–25)
BUPRENORPHINE SERPL-MCNC: NEGATIVE NG/ML
CALCIUM SPEC-SCNC: 9 MG/DL (ref 8.6–10.5)
CANNABINOIDS SERPL QL: NEGATIVE
CHLORIDE SERPL-SCNC: 102 MMOL/L (ref 98–107)
CLARITY UR: CLEAR
CO2 SERPL-SCNC: 24.8 MMOL/L (ref 22–29)
COCAINE UR QL: NEGATIVE
COLOR UR: ABNORMAL
CREAT SERPL-MCNC: 0.71 MG/DL (ref 0.76–1.27)
DEPRECATED RDW RBC AUTO: 44.7 FL (ref 37–54)
EGFRCR SERPLBLD CKD-EPI 2021: 120.4 ML/MIN/1.73
EOSINOPHIL # BLD AUTO: 0.04 10*3/MM3 (ref 0–0.4)
EOSINOPHIL NFR BLD AUTO: 0.4 % (ref 0.3–6.2)
ERYTHROCYTE [DISTWIDTH] IN BLOOD BY AUTOMATED COUNT: 13.2 % (ref 12.3–15.4)
ETHANOL BLD-MCNC: <10 MG/DL (ref 0–10)
ETHANOL UR QL: <0.01 %
FENTANYL UR-MCNC: NEGATIVE NG/ML
GLOBULIN UR ELPH-MCNC: 3.5 GM/DL
GLUCOSE SERPL-MCNC: 99 MG/DL (ref 65–99)
GLUCOSE UR STRIP-MCNC: NEGATIVE MG/DL
HAV IGM SERPL QL IA: ABNORMAL
HBV CORE IGM SERPL QL IA: ABNORMAL
HBV SURFACE AG SERPL QL IA: ABNORMAL
HCT VFR BLD AUTO: 38.2 % (ref 37.5–51)
HCV AB SER QL: REACTIVE
HGB BLD-MCNC: 12.3 G/DL (ref 13–17.7)
HGB UR QL STRIP.AUTO: NEGATIVE
IMM GRANULOCYTES # BLD AUTO: 0.03 10*3/MM3 (ref 0–0.05)
IMM GRANULOCYTES NFR BLD AUTO: 0.3 % (ref 0–0.5)
KETONES UR QL STRIP: ABNORMAL
LEUKOCYTE ESTERASE UR QL STRIP.AUTO: NEGATIVE
LYMPHOCYTES # BLD AUTO: 2.35 10*3/MM3 (ref 0.7–3.1)
LYMPHOCYTES NFR BLD AUTO: 24.6 % (ref 19.6–45.3)
MAGNESIUM SERPL-MCNC: 1.7 MG/DL (ref 1.6–2.6)
MCH RBC QN AUTO: 29.7 PG (ref 26.6–33)
MCHC RBC AUTO-ENTMCNC: 32.2 G/DL (ref 31.5–35.7)
MCV RBC AUTO: 92.3 FL (ref 79–97)
METHADONE UR QL SCN: NEGATIVE
MONOCYTES # BLD AUTO: 0.69 10*3/MM3 (ref 0.1–0.9)
MONOCYTES NFR BLD AUTO: 7.2 % (ref 5–12)
NEUTROPHILS NFR BLD AUTO: 6.41 10*3/MM3 (ref 1.7–7)
NEUTROPHILS NFR BLD AUTO: 67.2 % (ref 42.7–76)
NITRITE UR QL STRIP: NEGATIVE
NRBC BLD AUTO-RTO: 0 /100 WBC (ref 0–0.2)
OPIATES UR QL: NEGATIVE
OXYCODONE UR QL SCN: NEGATIVE
PCP UR QL SCN: NEGATIVE
PH UR STRIP.AUTO: 5.5 [PH] (ref 5–8)
PLATELET # BLD AUTO: 251 10*3/MM3 (ref 140–450)
PMV BLD AUTO: 9.7 FL (ref 6–12)
POTASSIUM SERPL-SCNC: 3.5 MMOL/L (ref 3.5–5.2)
PROT SERPL-MCNC: 7.2 G/DL (ref 6–8.5)
PROT UR QL STRIP: NEGATIVE
RBC # BLD AUTO: 4.14 10*6/MM3 (ref 4.14–5.8)
SODIUM SERPL-SCNC: 138 MMOL/L (ref 136–145)
SP GR UR STRIP: 1.02 (ref 1–1.03)
TRICYCLICS UR QL SCN: NEGATIVE
UROBILINOGEN UR QL STRIP: ABNORMAL
WBC NRBC COR # BLD AUTO: 9.55 10*3/MM3 (ref 3.4–10.8)

## 2025-01-07 PROCEDURE — 80307 DRUG TEST PRSMV CHEM ANLYZR: CPT | Performed by: STUDENT IN AN ORGANIZED HEALTH CARE EDUCATION/TRAINING PROGRAM

## 2025-01-07 PROCEDURE — 99223 1ST HOSP IP/OBS HIGH 75: CPT | Performed by: PSYCHIATRY & NEUROLOGY

## 2025-01-07 PROCEDURE — 36415 COLL VENOUS BLD VENIPUNCTURE: CPT

## 2025-01-07 PROCEDURE — 81003 URINALYSIS AUTO W/O SCOPE: CPT | Performed by: STUDENT IN AN ORGANIZED HEALTH CARE EDUCATION/TRAINING PROGRAM

## 2025-01-07 PROCEDURE — 80074 ACUTE HEPATITIS PANEL: CPT | Performed by: PSYCHIATRY & NEUROLOGY

## 2025-01-07 PROCEDURE — 93005 ELECTROCARDIOGRAM TRACING: CPT | Performed by: STUDENT IN AN ORGANIZED HEALTH CARE EDUCATION/TRAINING PROGRAM

## 2025-01-07 PROCEDURE — 93010 ELECTROCARDIOGRAM REPORT: CPT | Performed by: INTERNAL MEDICINE

## 2025-01-07 PROCEDURE — 83735 ASSAY OF MAGNESIUM: CPT | Performed by: STUDENT IN AN ORGANIZED HEALTH CARE EDUCATION/TRAINING PROGRAM

## 2025-01-07 PROCEDURE — 85025 COMPLETE CBC W/AUTO DIFF WBC: CPT | Performed by: STUDENT IN AN ORGANIZED HEALTH CARE EDUCATION/TRAINING PROGRAM

## 2025-01-07 PROCEDURE — 96372 THER/PROPH/DIAG INJ SC/IM: CPT

## 2025-01-07 PROCEDURE — 99285 EMERGENCY DEPT VISIT HI MDM: CPT

## 2025-01-07 PROCEDURE — 80053 COMPREHEN METABOLIC PANEL: CPT | Performed by: STUDENT IN AN ORGANIZED HEALTH CARE EDUCATION/TRAINING PROGRAM

## 2025-01-07 PROCEDURE — 82077 ASSAY SPEC XCP UR&BREATH IA: CPT | Performed by: STUDENT IN AN ORGANIZED HEALTH CARE EDUCATION/TRAINING PROGRAM

## 2025-01-07 PROCEDURE — 25010000002 LORAZEPAM PER 2 MG: Performed by: STUDENT IN AN ORGANIZED HEALTH CARE EDUCATION/TRAINING PROGRAM

## 2025-01-07 RX ORDER — BENZTROPINE MESYLATE 1 MG/1
2 TABLET ORAL ONCE AS NEEDED
Status: DISCONTINUED | OUTPATIENT
Start: 2025-01-07 | End: 2025-01-07

## 2025-01-07 RX ORDER — ACETAMINOPHEN 500 MG
500 TABLET ORAL EVERY 6 HOURS PRN
COMMUNITY
End: 2025-01-15 | Stop reason: HOSPADM

## 2025-01-07 RX ORDER — IBUPROFEN 400 MG/1
800 TABLET, FILM COATED ORAL EVERY 8 HOURS PRN
Status: CANCELLED | OUTPATIENT
Start: 2025-01-07

## 2025-01-07 RX ORDER — HYDROXYZINE HYDROCHLORIDE 50 MG/1
50 TABLET, FILM COATED ORAL EVERY 6 HOURS PRN
Status: DISCONTINUED | OUTPATIENT
Start: 2025-01-07 | End: 2025-01-10

## 2025-01-07 RX ORDER — ONDANSETRON 4 MG/1
4 TABLET, ORALLY DISINTEGRATING ORAL EVERY 8 HOURS PRN
Status: CANCELLED | OUTPATIENT
Start: 2025-01-07

## 2025-01-07 RX ORDER — ACETAMINOPHEN 500 MG
500 TABLET ORAL EVERY 6 HOURS PRN
Status: CANCELLED | OUTPATIENT
Start: 2025-01-07

## 2025-01-07 RX ORDER — CLONIDINE HYDROCHLORIDE 0.1 MG/1
0.1 TABLET ORAL 3 TIMES DAILY PRN
COMMUNITY
End: 2025-01-15 | Stop reason: HOSPADM

## 2025-01-07 RX ORDER — BENZONATATE 100 MG/1
100 CAPSULE ORAL 3 TIMES DAILY PRN
Status: DISCONTINUED | OUTPATIENT
Start: 2025-01-07 | End: 2025-01-15 | Stop reason: HOSPADM

## 2025-01-07 RX ORDER — HYDROXYZINE PAMOATE 50 MG/1
50 CAPSULE ORAL 3 TIMES DAILY PRN
Status: ON HOLD | COMMUNITY
End: 2025-01-07

## 2025-01-07 RX ORDER — MIRTAZAPINE 15 MG/1
15 TABLET, FILM COATED ORAL NIGHTLY
Status: CANCELLED | OUTPATIENT
Start: 2025-01-07

## 2025-01-07 RX ORDER — BUSPIRONE HYDROCHLORIDE 10 MG/1
10 TABLET ORAL 3 TIMES DAILY
COMMUNITY
End: 2025-01-15 | Stop reason: HOSPADM

## 2025-01-07 RX ORDER — ACETAMINOPHEN 325 MG/1
650 TABLET ORAL EVERY 6 HOURS PRN
Status: DISCONTINUED | OUTPATIENT
Start: 2025-01-07 | End: 2025-01-15 | Stop reason: HOSPADM

## 2025-01-07 RX ORDER — TRAZODONE HYDROCHLORIDE 50 MG/1
50 TABLET, FILM COATED ORAL NIGHTLY
COMMUNITY
End: 2025-01-15 | Stop reason: HOSPADM

## 2025-01-07 RX ORDER — TRAZODONE HYDROCHLORIDE 50 MG/1
50 TABLET, FILM COATED ORAL NIGHTLY PRN
Status: DISCONTINUED | OUTPATIENT
Start: 2025-01-07 | End: 2025-01-07

## 2025-01-07 RX ORDER — MIRTAZAPINE 15 MG/1
15 TABLET, FILM COATED ORAL NIGHTLY
Status: DISCONTINUED | OUTPATIENT
Start: 2025-01-07 | End: 2025-01-15 | Stop reason: HOSPADM

## 2025-01-07 RX ORDER — METHOCARBAMOL 750 MG/1
750 TABLET, FILM COATED ORAL 3 TIMES DAILY PRN
COMMUNITY
End: 2025-01-15 | Stop reason: HOSPADM

## 2025-01-07 RX ORDER — IBUPROFEN 400 MG/1
400 TABLET, FILM COATED ORAL EVERY 6 HOURS PRN
Status: DISCONTINUED | OUTPATIENT
Start: 2025-01-07 | End: 2025-01-15 | Stop reason: HOSPADM

## 2025-01-07 RX ORDER — LORAZEPAM 2 MG/ML
2 INJECTION INTRAMUSCULAR ONCE
Status: COMPLETED | OUTPATIENT
Start: 2025-01-07 | End: 2025-01-07

## 2025-01-07 RX ORDER — FLUTICASONE PROPIONATE 50 MCG
2 SPRAY, SUSPENSION (ML) NASAL DAILY
Status: CANCELLED | OUTPATIENT
Start: 2025-01-07

## 2025-01-07 RX ORDER — BUPROPION HYDROCHLORIDE 300 MG/1
300 TABLET ORAL DAILY
COMMUNITY
End: 2025-01-15 | Stop reason: HOSPADM

## 2025-01-07 RX ORDER — CLONIDINE HYDROCHLORIDE 0.1 MG/1
0.1 TABLET ORAL 3 TIMES DAILY PRN
Status: DISCONTINUED | OUTPATIENT
Start: 2025-01-07 | End: 2025-01-15 | Stop reason: HOSPADM

## 2025-01-07 RX ORDER — NICOTINE 21 MG/24HR
1 PATCH, TRANSDERMAL 24 HOURS TRANSDERMAL
Status: DISCONTINUED | OUTPATIENT
Start: 2025-01-07 | End: 2025-01-15 | Stop reason: HOSPADM

## 2025-01-07 RX ORDER — METHOCARBAMOL 750 MG/1
750 TABLET, FILM COATED ORAL 3 TIMES DAILY PRN
Status: DISCONTINUED | OUTPATIENT
Start: 2025-01-07 | End: 2025-01-15 | Stop reason: HOSPADM

## 2025-01-07 RX ORDER — OLANZAPINE 5 MG/1
5 TABLET ORAL NIGHTLY
Status: DISCONTINUED | OUTPATIENT
Start: 2025-01-07 | End: 2025-01-15 | Stop reason: HOSPADM

## 2025-01-07 RX ORDER — MIRTAZAPINE 15 MG/1
15 TABLET, FILM COATED ORAL NIGHTLY
COMMUNITY
End: 2025-01-15 | Stop reason: HOSPADM

## 2025-01-07 RX ORDER — TRAZODONE HYDROCHLORIDE 50 MG/1
50 TABLET, FILM COATED ORAL NIGHTLY
Status: CANCELLED | OUTPATIENT
Start: 2025-01-07

## 2025-01-07 RX ORDER — FLUTICASONE PROPIONATE 50 MCG
2 SPRAY, SUSPENSION (ML) NASAL DAILY
COMMUNITY
End: 2025-01-15 | Stop reason: HOSPADM

## 2025-01-07 RX ORDER — BENZTROPINE MESYLATE 1 MG/ML
1 INJECTION, SOLUTION INTRAMUSCULAR; INTRAVENOUS ONCE AS NEEDED
Status: DISCONTINUED | OUTPATIENT
Start: 2025-01-07 | End: 2025-01-07

## 2025-01-07 RX ORDER — ECHINACEA PURPUREA EXTRACT 125 MG
2 TABLET ORAL AS NEEDED
Status: DISCONTINUED | OUTPATIENT
Start: 2025-01-07 | End: 2025-01-15 | Stop reason: HOSPADM

## 2025-01-07 RX ORDER — ONDANSETRON 4 MG/1
4 TABLET, ORALLY DISINTEGRATING ORAL EVERY 6 HOURS PRN
Status: DISCONTINUED | OUTPATIENT
Start: 2025-01-07 | End: 2025-01-15 | Stop reason: HOSPADM

## 2025-01-07 RX ORDER — LOPERAMIDE HYDROCHLORIDE 2 MG/1
2 CAPSULE ORAL
Status: DISCONTINUED | OUTPATIENT
Start: 2025-01-07 | End: 2025-01-15 | Stop reason: HOSPADM

## 2025-01-07 RX ORDER — BUSPIRONE HYDROCHLORIDE 10 MG/1
10 TABLET ORAL 3 TIMES DAILY
Status: CANCELLED | OUTPATIENT
Start: 2025-01-07

## 2025-01-07 RX ORDER — ALUMINA, MAGNESIA, AND SIMETHICONE 2400; 2400; 240 MG/30ML; MG/30ML; MG/30ML
15 SUSPENSION ORAL EVERY 6 HOURS PRN
Status: DISCONTINUED | OUTPATIENT
Start: 2025-01-07 | End: 2025-01-15 | Stop reason: HOSPADM

## 2025-01-07 RX ORDER — BUPROPION HYDROCHLORIDE 150 MG/1
300 TABLET ORAL DAILY
Status: DISCONTINUED | OUTPATIENT
Start: 2025-01-07 | End: 2025-01-15 | Stop reason: HOSPADM

## 2025-01-07 RX ORDER — POLYETHYLENE GLYCOL 3350 17 G/17G
17 POWDER, FOR SOLUTION ORAL DAILY PRN
Status: DISCONTINUED | OUTPATIENT
Start: 2025-01-07 | End: 2025-01-15 | Stop reason: HOSPADM

## 2025-01-07 RX ORDER — ONDANSETRON 4 MG/1
4 TABLET, ORALLY DISINTEGRATING ORAL EVERY 8 HOURS PRN
COMMUNITY
End: 2025-01-15 | Stop reason: HOSPADM

## 2025-01-07 RX ORDER — FAMOTIDINE 20 MG/1
20 TABLET, FILM COATED ORAL 2 TIMES DAILY PRN
Status: DISCONTINUED | OUTPATIENT
Start: 2025-01-07 | End: 2025-01-15 | Stop reason: HOSPADM

## 2025-01-07 RX ORDER — IBUPROFEN 800 MG/1
800 TABLET, FILM COATED ORAL EVERY 8 HOURS PRN
COMMUNITY
End: 2025-01-15 | Stop reason: HOSPADM

## 2025-01-07 RX ADMIN — OLANZAPINE 5 MG: 5 TABLET, FILM COATED ORAL at 20:55

## 2025-01-07 RX ADMIN — BUPROPION HYDROCHLORIDE 300 MG: 150 TABLET, EXTENDED RELEASE ORAL at 14:08

## 2025-01-07 RX ADMIN — LORAZEPAM 2 MG: 2 INJECTION INTRAMUSCULAR; INTRAVENOUS at 02:06

## 2025-01-07 RX ADMIN — MIRTAZAPINE 15 MG: 15 TABLET, FILM COATED ORAL at 20:55
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
## 2025-01-07 NOTE — PLAN OF CARE
Goal Outcome Evaluation:  Plan of Care Reviewed With: patient  Plan of Care Reviewed With: patient  Patient Agreement with Plan of Care: agrees     Progress: improving  Outcome Evaluation: Patient rates both anxiety and depresion 10/10. Patient denies any HI/AVH. Patient reports good sleep and appetite. Patient ahd no complaints this shift.
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
## 2025-01-07 NOTE — NURSING NOTE
Patient was in bed masturbating, patients roommate woke up and saw him and became upset. Patient came out of room yelling and cursing. Patients roommate then requested to be moved to a different room.
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
## 2025-01-07 NOTE — PROGRESS NOTES
Navigator is helping with the following referrals:    [facility] - [phone]  -Sent 1/7    [facility] [place] - [phone]  -Sent 1/7    [facility] [place]  [phone]  -Sent 1/7    Charissa Caputo [facility] - [phone]  -Sent 1/7

## 2025-01-07 NOTE — NURSING NOTE
Spoke to Dr. Lozada, discussed assessment and labs, new orders to admit the patient to adult with routine orders SP3.  [id number]
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
## 2025-01-08 PROCEDURE — 99232 SBSQ HOSP IP/OBS MODERATE 35: CPT | Performed by: PSYCHIATRY & NEUROLOGY

## 2025-01-08 RX ADMIN — MIRTAZAPINE 15 MG: 15 TABLET, FILM COATED ORAL at 20:23

## 2025-01-08 RX ADMIN — BUPROPION HYDROCHLORIDE 300 MG: 150 TABLET, EXTENDED RELEASE ORAL at 08:15

## 2025-01-08 RX ADMIN — OLANZAPINE 5 MG: 5 TABLET, FILM COATED ORAL at 20:23

## 2025-01-08 NOTE — PROGRESS NOTES
Navigator is helping with the following referrals:     Gama Kirksey - [phone]  -Sent 1/8    [facility] - [phone]  -Sent 1/8    [facility] - [phone]  -Sent 1/7  -Left message for Cami. 1/8     Boaz Kirksey - [phone]  -Sent 1/7  -No answer. 1/8     Aroldo Kirksey - [phone]  -Sent 1/7  -No answer.  1/8     Charissa [facility] - [phone]  -Sent 1/7  -Admissions out of the office until Monday. 1/8   Not applicable

## 2025-01-08 NOTE — PLAN OF CARE
Problem: Adult Behavioral Health Plan of Care  Goal: Patient-Specific Goal (Individualization)  Outcome: Progressing  Flowsheets  Taken 1/7/2025 1025  Patient/Family-Specific Goals (Include Timeframe): Cruz to deny suicidal ideation prior to discharge. Cruz will attend group therapy over the next 72 hours to discuss information learned to assist with development of 1-2 healthy coping in order to prevent relapse. Patient to engage in MI working to identify motivation to change during his 4-7 day hospital stay. Patient plans to attend [facility] following stabilization with a  long-term goal of maintaining sobriety.  Individualized Care Needs: Medication management, individual and group therapy.  Anxieties, Fears or Concerns: none reported  Taken 1/7/2025 1011  Patient Personal Strengths:   expressive of emotions   expressive of needs   motivated for treatment  Patient Vulnerabilities:   family/relationship conflict   history of unsuccessful treatment   lacks insight into illness   substance abuse/addiction   poor impulse control   limited social skills  Goal: Optimized Coping Skills in Response to Life Stressors  Outcome: Progressing  Intervention: Promote Effective Coping Strategies  Flowsheets (Taken 1/8/2025 1612)  Supportive Measures:   active listening utilized   self-reflection promoted   counseling provided   positive reinforcement provided   self-responsibility promoted   decision-making supported   goal-setting facilitated   problem-solving facilitated   verbalization of feelings encouraged   self-care encouraged  Goal: Develops/Participates in Therapeutic Evans to Support Successful Transition  Outcome: Progressing  Intervention: Foster Therapeutic Evans  Flowsheets (Taken 1/8/2025 1612)  Trust Relationship/Rapport:   care explained   reassurance provided   choices provided   thoughts/feelings acknowledged   emotional support provided   empathic listening provided   questions answered   questions  encouraged  Intervention: Mutually Develop Transition Plan  Flowsheets  Taken 1/8/2025 1612  Transition Support:   community resources reviewed   crisis management plan promoted   follow-up care discussed  Taken 1/8/2025 1611  Transportation Anticipated:   agency   health plan transportation  Transportation Concerns: none  Current Discharge Risk:   psychiatric illness   substance use/abuse   lack of support system/caregiver   cognitively impaired   homeless  Concerns to be Addressed:   coping/stress   cognitive/perceptual   relationship   compliance issue   mental health   medication   substance/tobacco abuse/use  Readmission Within the Last 30 Days: no previous admission in last 30 days  Patient/Family Anticipated Services at Transition: other (see comments)  Patient/Family Anticipates Transition to: other (see comments)  Offered/Gave Vendor List: no       Data:  11:30 am to 12:00 pm Therapist reviewed Dr. Lozada's assessment, discussed patient with nursing staff and met with patient this date to further discuss patient progress, review healthy coping and safe disposition.      Clinical Maneuvering/Intervention:    Therapist assisted patient in processing session content; acknowledged and normalized patient's thoughts, feelings and concerns.  Encouraged patient to discuss/vent feelings, frustrations, and fears concerning their ongoing issues and validated patients feelings.  Discussed the importance of healthy coping and reviewed healthy coping skills such as distraction and social support.  Reviewed safe disposition with patient.    Assessment:  Patient denies suicidal ideation/homicidal ideation.  Patient reports decrease in depression and anxiety today.  Patient was encouraged to engage in group therapy throughout the day.  Patient reported attending groups and he was making a list of names of people he has been impacted by.  He stated that he is feeling better but is just tired.  Discussed efforts made toward  securing [facility] and that there are not as many options as in the past.  He discussed that he has been kicked out of [facility] in the past and thinks he might not be able to return there.  Discussed concerns with patients previous behaviors and the importance of learning to manage his responses.  He asked about [place] for a possibility for a [facility] and this therapist discussed that there was at least one in [place] that a referral had been sent to.  Discussed that referrals sent would be followed up on and that he would be informed if a placement is found.        Plan:  Patient will continue hospitalization/medication management. Efforts continue to secure safe disposition for patient.

## 2025-01-08 NOTE — PROGRESS NOTES
"INPATIENT PSYCHIATRIC PROGRESS NOTE    Name:  Cruz Mims  :  1985  MRN:  [id number]  Visit Number:  [id number]  Length of stay:  1    Behavioral Health Treatment Plan and Problem List: I have reviewed and approved the Behavioral Health Treatment Plan and Problem list.    SUBJECTIVE    CC/ Focus of exam:  depression    Patient's subjective status: \"better today\"    INTERVAL HISTORY:     The patient presents today denying active suicidal thought content or psychotic symptoms.  \"All better\".  This is fairly typical for this patient with a quick recovery from adverse events and lack of a place to go.  Unfortunately record of rapid transition from one program to another partially based on his impulsivity and lack of conformity.  We will continue with low-dose SGA and antidepressant with focus on finding a reasonable residential/placement disposition.    Depression rating 5/10  Anxiety rating 5/10  Hopelessness: 0/10  Sleep: 8 hours  Withdrawal sx: None  Craving: Denies       ROS: No cardiovascular, GI, or Neurological complaints.       OBJECTIVE    Vitals:    25 0730   BP: 108/59   Pulse: 77   Resp: 16   Temp: 98.7 °F (37.1 °C)   SpO2: 95%      Temp:  [97.9 °F (36.6 °C)-99.5 °F (37.5 °C)] 98.7 °F (37.1 °C)  Heart Rate:  [74-84] 77  Resp:  [16-18] 16  BP: ()/(56-66) 108/59    MENTAL STATUS EXAM:       Psychomotor: No psychomotor agitation/retardation, No EPS, No motor tics  Speech-normal rate, amount.  Mood/Affect: Appropriate  Thought Processes: coherent  Thought Content: normal  Hallucination(s): none  Hopelessness: No  Optimistic: minimally  Suicidal Thoughts: No, \"not today\"  Suicidal Plan/Intent: No  Homicidal Thoughts: absent  Orientation: oriented x 3  Memory: recent intact    Lab Results (last 24 hours)       ** No results found for the last 24 hours. **             Imaging Results (Last 24 Hours)       ** No results found for the last 24 hours. **             Lab and x-ray studies " reviewed.    ECG/EMG Results (most recent)       None             ALLERGIES: Amoxicillin, Penicillins, and Venlafaxine    Medications:     buPROPion XL, 300 mg, Oral, Daily  mirtazapine, 15 mg, Oral, Nightly  nicotine, 1 patch, Transdermal, Q24H  OLANZapine, 5 mg, Oral, Nightly       ASSESSMENT & PLAN     Suicidal ideation  Patient on precautions       Brief psychotic disorder  Olanzapine plus a supportive individual group psychotherapeutic effort       Other recurrent depressive disorders  Mirtazapine plus the psychotherapeutic effort       Methamphetamine abuse  Endorse his recovery effort       Borderline intellectual disability  Incorporate into the treatment plan       ADHD (history of)  Incorporate into the treatment plan       Hepatitis C  Referral for outpatient clinic      Special precautions: Special Precautions Level 3 (q15 min checks)     Behavioral Health Treatment Plan and Problem List: I have reviewed and approved the Behavioral Health Treatment Plan and Problem list.    I spent a total of 26 minutes in direct patient care including  17 minutes face to face with the patient assessment, coordination of care, and counseling the patient on the current and follow-up treatment plans regarding his status and situation.  Patient had no additional questions.     SHERLYN Lozada MD    Clinician:  Carlton Lozada MD  01/08/25  09:49 EST    Dictated utilizing Dragon dictation
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
## 2025-01-08 NOTE — PLAN OF CARE
Goal Outcome Evaluation:  Plan of Care Reviewed With: patient  Plan of Care Reviewed With: patient  Patient Agreement with Plan of Care: agrees     Progress: improving  Outcome Evaluation: Pt calm and cooperative during assessment. Pt denied anxiety and depression. Positive for SI without a plan. Agrees to seek out staff if thoughts increase. Denies HI/AVH. Pt isolates in room and avoids social interactions.

## 2025-01-08 NOTE — PLAN OF CARE
Goal Outcome Evaluation:  Plan of Care Reviewed With: patient  Plan of Care Reviewed With: patient  Patient Agreement with Plan of Care: agrees        Consent Given to Review Plan with: Patient rates both anxiety and depression 0/10. Patient denies any SI/HI/AVH. Patient reports good sleep and appetite. Patient had no complaints this shift.

## 2025-01-09 PROCEDURE — 99232 SBSQ HOSP IP/OBS MODERATE 35: CPT | Performed by: PSYCHIATRY & NEUROLOGY

## 2025-01-09 RX ADMIN — BUPROPION HYDROCHLORIDE 300 MG: 150 TABLET, EXTENDED RELEASE ORAL at 08:46

## 2025-01-09 RX ADMIN — HYDROXYZINE HYDROCHLORIDE 50 MG: 50 TABLET ORAL at 20:12

## 2025-01-09 RX ADMIN — OLANZAPINE 5 MG: 5 TABLET, FILM COATED ORAL at 20:12

## 2025-01-09 RX ADMIN — MIRTAZAPINE 15 MG: 15 TABLET, FILM COATED ORAL at 20:12

## 2025-01-09 NOTE — PLAN OF CARE
Goal Outcome Evaluation:  Plan of Care Reviewed With: patient  Plan of Care Reviewed With: patient  Patient Agreement with Plan of Care: agrees  Consent Given to Review Plan with: Patient reports anxiety 0 /depression 0. Pt denies any SI/HI/.  Patient pacing unit- talking to self. Patient required redirection several times this shift.                                    No

## 2025-01-09 NOTE — PLAN OF CARE
Problem: Adult Behavioral Health Plan of Care  Goal: Patient-Specific Goal (Individualization)  Outcome: Progressing  Flowsheets  Taken 1/7/2025 1025  Patient/Family-Specific Goals (Include Timeframe): Cruz to deny suicidal ideation prior to discharge. Cruz will attend group therapy over the next 72 hours to discuss information learned to assist with development of 1-2 healthy coping in order to prevent relapse. Patient to engage in MI working to identify motivation to change during his 4-7 day hospital stay. Patient plans to attend [facility] following stabilization with a  long-term goal of maintaining sobriety.  Individualized Care Needs: Medication management, individual and group therapy.  Anxieties, Fears or Concerns: none reported  Taken 1/7/2025 1011  Patient Personal Strengths:   expressive of emotions   expressive of needs   motivated for treatment  Patient Vulnerabilities:   family/relationship conflict   history of unsuccessful treatment   lacks insight into illness   substance abuse/addiction   poor impulse control   limited social skills  Goal: Optimized Coping Skills in Response to Life Stressors  Outcome: Progressing  Intervention: Promote Effective Coping Strategies  Flowsheets (Taken 1/9/2025 1421)  Supportive Measures:   active listening utilized   self-reflection promoted   counseling provided   self-responsibility promoted   positive reinforcement provided   decision-making supported   goal-setting facilitated   problem-solving facilitated   verbalization of feelings encouraged   self-care encouraged  Goal: Develops/Participates in Therapeutic Lueders to Support Successful Transition  Outcome: Progressing  Intervention: Foster Therapeutic Lueders  Flowsheets (Taken 1/9/2025 1429)  Trust Relationship/Rapport:   care explained   choices provided   reassurance provided   thoughts/feelings acknowledged   emotional support provided   empathic listening provided   questions answered   questions  encouraged  Intervention: Mutually Develop Transition Plan  Flowsheets  Taken 1/9/2025 1428  Transition Support:   community resources reviewed   crisis management plan promoted   follow-up care discussed  Taken 1/9/2025 1427  Transportation Anticipated:   agency   health plan transportation  Transportation Concerns: none  Current Discharge Risk:   psychiatric illness   substance use/abuse   lack of support system/caregiver   cognitively impaired   homeless  Concerns to be Addressed:   coping/stress   cognitive/perceptual   relationship   compliance issue   mental health   medication   substance/tobacco abuse/use  Readmission Within the Last 30 Days: no previous admission in last 30 days  Patient/Family Anticipated Services at Transition: other (see comments)  Patient's Choice of Community Agency(s): Guardian maintains that patient is in need of [facility] placement  Patient/Family Anticipates Transition to: other (see comments)  Offered/Gave Vendor List: no        Data: 11:40 to 12:10 Therapist reviewed Dr. Lozada's assessment, discussed patient with nursing staff and met with patient this date to further discuss patient progress, review healthy coping and safe disposition.  Patient contacted his brother during the session and asked if he could return to his brothers home.  Patients brother was agreeable to return to his brothers home.  Patient then attempted contact with his guardian and was informed she is out of the office until 2 pm.      Therapist contacted patients guardian at 1355 to discuss patients plan.  She reported that patient has been to his brother's home on 4 different occasions since September of 2024.  She reports that patient will be okay for 2-3 days and will start misbehaving.  She reports that there is a child in the home and patient has engaged in some inappropriate behaviors making an inappropriate environment for the child.  Guardian reports that patient is in need of [facility] placement at this time  "for safety reasons.      Clinical Maneuvering/Intervention:    Therapist assisted patient in processing session content; acknowledged and normalized patient's thoughts, feelings and concerns.  Encouraged patient to discuss/vent feelings, frustrations, and fears concerning their ongoing issues and validated patients feelings.  Discussed the importance of healthy coping and reviewed healthy coping skills such as distraction, taking a time out and social support.  Reviewed safe disposition with patient.    Assessment:  Patient denies suicidal ideation/homicidal ideation.  Patient reports decrease in depression and anxiety today.  Patient states he wants to return to his brother's home this date.  He reports that he became scared that his brother was going to kick him out so he left.  Discussed with patient that the guardian would have to be in agreement with this plan and patient attempted contact with her with no success.    Later patient was upset that staff would not allow him to contact his guardian outside of phone time and would not allow him to watch [employer]ate Kid.  Patient reported he thinks the nurse \"has something against me.\"  Encouraged patient that this therapist would contact the guardian regarding the disposition plan and patient would be informed of her response.    Plan:  Patient will continue hospitalization/medication management. Patient will attend [facility] upon stabilization.                        "

## 2025-01-09 NOTE — PLAN OF CARE
Goal Outcome Evaluation:  Plan of Care Reviewed With: patient  Plan of Care Reviewed With: patient  Patient Agreement with Plan of Care: agrees     Progress: improving  Outcome Evaluation: Pt calm and cooperative during assessment. Pt isolates in room and avoids social interactions. Pt denied anxiety, depression and denied SI/HI/AVH. Rated poor sleep and good appetite.

## 2025-01-09 NOTE — NURSING NOTE
Pt came to nurses station raising his voice, telling staff he needed to be discharged back to his brother. Staff made therapist Mariella aware of pt's wishes.   Pt continues yelling loudly, however his speech is incoherent and not understandable. Pt pacing around the unit, telling staff to contact his therapist and brother. Pt unable to be redirected verbally by staff. Security on unit. Pt eventually able to be redirectable and is currently in day room watching television.

## 2025-01-09 NOTE — PROGRESS NOTES
"INPATIENT PSYCHIATRIC PROGRESS NOTE    Name:  Cruz Mims  :  1985  MRN:  [id number]  Visit Number:  [id number]  Length of stay:  2    Behavioral Health Treatment Plan and Problem List: I have reviewed and approved the Behavioral Health Treatment Plan and Problem list.    SUBJECTIVE    CC/ Focus of exam:  depression    Patient's subjective status: \"Doing okay\"    INTERVAL HISTORY: The patient is probably at his baseline for good behavior free of psychotic thought content and not clinically depressed. Awaiting finding a receptive H placement. Might check with [facility] here in [place].  Depression rating 0/10  Anxiety rating 0/10  Hopelessness: 0/10  Sleep: 8 to 9 hours  Withdrawal sx: None  Craving: Says not       ROS: No cardiovascular, GI, or Neurological complaints.       OBJECTIVE    Vitals:    25 0800   BP: 104/77   Pulse: 102   Resp: 18   Temp: 97.1 °F (36.2 °C)   SpO2: 97%      Temp:  [97 °F (36.1 °C)-97.1 °F (36.2 °C)] 97.1 °F (36.2 °C)  Heart Rate:  [] 102  Resp:  [16-18] 18  BP: (104-127)/(75-77) 104/77    MENTAL STATUS EXAM:       Psychomotor: No psychomotor agitation/retardation, No EPS, No motor tics  Speech-normal rate, amount.  Mood/Affect: Appropriate  Thought Processes: coherent  Thought Content: normal  Hallucination(s): none  Hopelessness: No  Optimistic: minimally  Suicidal Thoughts: No  Suicidal Plan/Intent: No  Homicidal Thoughts: absent  Orientation: oriented x 3  Memory: recent intact    Lab Results (last 24 hours)       ** No results found for the last 24 hours. **             Imaging Results (Last 24 Hours)       ** No results found for the last 24 hours. **             Lab and x-ray studies reviewed.    ECG/EMG Results (most recent)       None             ALLERGIES: Amoxicillin, Penicillins, and Venlafaxine    Medications:     buPROPion XL, 300 mg, Oral, Daily  mirtazapine, 15 mg, Oral, Nightly  nicotine, 1 patch, Transdermal, Q24H  OLANZapine, 5 mg, " Oral, Nightly       ASSESSMENT & PLAN        Brief psychotic disorder  Olanzapine plus a supportive individual group psychotherapeutic effort       Other recurrent depressive disorders  Mirtazapine plus the psychotherapeutic effort       Methamphetamine abuse  Endorse his recovery effort       Borderline intellectual disability  Incorporate into the treatment plan       ADHD (history of)  Incorporate into the treatment plan       Hepatitis C  Referral for outpatient clinic      Special precautions: Special Precautions Level 3 (q15 min checks)     Behavioral Health Treatment Plan and Problem List: I have reviewed and approved the Behavioral Health Treatment Plan and Problem list.    I spent a total of 26 minutes in direct patient care including  17 minutes face to face with the patient assessment, coordination of care, and counseling the patient on the current and follow-up treatment plans regarding his status and situation.  Patient had no additional questions.     SHERLYN Lozada MD    Clinician:  Carlton Lozada MD  01/09/25  08:34 EST    Dictated utilizing Dragon dictation

## 2025-01-09 NOTE — PROGRESS NOTES
Navigator is helping with the following referrals:    [facility] - [phone]  -Sent 1/9    [facility] - [phone]  -Sent 1/9     [facility] - [phone]  -Sent 1/8  -Staff states  will call tomorrow to set up a Zoom interview with patient.  1/9     [facility] - [phone]  -Sent 1/8  -Referral still in review.  1/9     [facility] [phone]  -Sent 1/7  -Left message for Cami. 1/8   -Spoke with Cami. She did not receive referral but doesn't think they can accept patient due to him being there in the past.  1/9     [facility] - [phone]  -Sent 1/7  -No answer. 1/8  -No answer  1/9     [facility] - [phone]  -Sent 1/7  -No answer.  1/8  -Admissions in a meeting.  1/9     [facility] [place] [facility] - [phone]  -Sent 1/7  -Admissions out of the office until Monday. 1/8
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
## 2025-01-10 LAB
QT INTERVAL: 416 MS
QTC INTERVAL: 483 MS

## 2025-01-10 PROCEDURE — 99232 SBSQ HOSP IP/OBS MODERATE 35: CPT | Performed by: PSYCHIATRY & NEUROLOGY

## 2025-01-10 RX ORDER — OLANZAPINE 5 MG/1
5 TABLET, ORALLY DISINTEGRATING ORAL 3 TIMES DAILY PRN
Status: DISCONTINUED | OUTPATIENT
Start: 2025-01-10 | End: 2025-01-15 | Stop reason: HOSPADM

## 2025-01-10 RX ADMIN — MIRTAZAPINE 15 MG: 15 TABLET, FILM COATED ORAL at 21:59

## 2025-01-10 RX ADMIN — METHOCARBAMOL TABLETS 750 MG: 750 TABLET, COATED ORAL at 13:36

## 2025-01-10 RX ADMIN — OLANZAPINE 5 MG: 5 TABLET, FILM COATED ORAL at 21:59

## 2025-01-10 RX ADMIN — OLANZAPINE 5 MG: 5 TABLET, ORALLY DISINTEGRATING ORAL at 13:36

## 2025-01-10 RX ADMIN — BUPROPION HYDROCHLORIDE 300 MG: 150 TABLET, EXTENDED RELEASE ORAL at 08:16

## 2025-01-10 NOTE — PROGRESS NOTES
"INPATIENT PSYCHIATRIC PROGRESS NOTE    Name:  Cruz Mims  :  1985  MRN:  [id number]  Visit Number:  [id number]  Length of stay:  3    Behavioral Health Treatment Plan and Problem List: I have reviewed and approved the Behavioral Health Treatment Plan and Problem list.    SUBJECTIVE    CC/ Focus of exam:  Inappropriate demanding behavior    Patient's subjective status: \"I'm ok\"    INTERVAL HISTORY:     Disruptive and inappropriate behavior yesterday coupled with his desire to be discharged saying he is welcome back at his brother's. This morning at interview patient saying he understands the need for everybody to agree with his plan. Therapist working on aligning the patient, his brother and his guardian as well as other disposition options. He says he talked to his brother yesterday and he agreed he could return.      Nursing reports patient actively experiencing auditory hallucinations that he denies this AM. No demonstrated active psychotic thought content - impulsivity coupled with autistic elements could likely account for with his behaviors.      Patient agreeing to modify his behavior on the unit - to be more appropriate.     Depression rating says not  Anxiety rating restless  Hopelessness: says not  Sleep:8 hours         ROS: No cardiovascular, GI, or Neurological complaints.       OBJECTIVE    Vitals:    01/10/25 0730   BP: 98/61   Pulse: 67   Resp: 18   Temp: 97.9 °F (36.6 °C)   SpO2: 98%      Temp:  [97.9 °F (36.6 °C)-98.6 °F (37 °C)] 97.9 °F (36.6 °C)  Heart Rate:  [67-82] 67  Resp:  [16-18] 18  BP: ()/(61-78) 98/61    MENTAL STATUS EXAM:       Psychomotor: No psychomotor agitation/retardation, No EPS, No motor tics  Speech-normal rate, amount.  Mood/Affect:  intermittent volitite and demanding  Thought Processes:  focused on leaving and going to his brother's  Thought Content: irrational  Hallucination(s): none  Hopelessness: No  Optimistic: no  Suicidal Thoughts: No  Suicidal " Plan/Intent: No  Homicidal Thoughts: absent  Orientation: oriented x 3  Memory: recent intact    Lab Results (last 24 hours)       ** No results found for the last 24 hours. **             Imaging Results (Last 24 Hours)       ** No results found for the last 24 hours. **             Lab and x-ray studies reviewed.    ECG/EMG Results (most recent)       None             ALLERGIES: Amoxicillin, Penicillins, and Venlafaxine    Medications:     buPROPion XL, 300 mg, Oral, Daily  mirtazapine, 15 mg, Oral, Nightly  nicotine, 1 patch, Transdermal, Q24H  OLANZapine, 5 mg, Oral, Nightly       ASSESSMENT & PLAN      Brief psychotic disorder  Olanzapine plus a supportive individual group psychotherapeutic effort       Other recurrent depressive disorders  Mirtazapine plus the psychotherapeutic effort       Methamphetamine abuse  Endorse his recovery effort       Borderline intellectual disability  Incorporate into the treatment plan       ADHD (history of)  Incorporate into the treatment plan       Hepatitis C  Referral for outpatient clinic      Special precautions: Special Precautions Level 3 (q15 min checks)     Behavioral Health Treatment Plan and Problem List: I have reviewed and approved the Behavioral Health Treatment Plan and Problem list.    I spent a total of 26 minutes in direct patient care including  17 minutes face to face with the patient assessment, coordination of care, and counseling the patient on the current and follow-up treatment plans regarding his status and situation.  Patient had no additional questions.     SHERLYN Lozada MD    Clinician:  Carlton Lozada MD  01/10/25  09:18 EST    Dictated utilizing Dragon dictation

## 2025-01-10 NOTE — PROGRESS NOTES
Navigator is helping with the following referrals:     [facility] - [phone]  -Sent 1/9  - out sick.  1/10     [facility] - [phone]  -Sent 1/9  -Admin not in the building today.  1/10     [facility] - [phone]  -Sent 1/8  -Staff states  will call tomorrow to set up a Zoom interview with patient.  1/9  -Spoke with Admin, Judy. She states to resend referral.  1/10     [facility] - [phone]  -Sent 1/8  -Referral still in review.  1/9  -Still in review. 1/10     [facility] - [phone]  -Sent 1/7  -Left message for Cami. 1/8   -Spoke with Cami. She did not receive referral but doesn't think they can accept patient due to him being there in the past.  1/9     [facility] - [phone]  -Sent 1/7  -No answer. 1/8  -No answer  1/9  -Admissions not in the building today.  1/10     [facility] - [phone]  -Sent 1/7  -No answer.  1/8  -Admissions in a meeting.  1/9  -Declined.  1/10     [facility] - [phone]  -Sent 1/7  -Admissions out of the office until Monday. 1/8

## 2025-01-10 NOTE — NURSING NOTE
Pt yelling & cursing loudly, pacing in and out of his room. Security called for standby, pt finally calmed down a little and agreed to take some vistaril.
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
## 2025-01-10 NOTE — NURSING NOTE
State guardian masood made aware of vistaril 50 mg prn being discontinued and zydis 5mg p.o 3x daily prn ordered and voiced understanding and was ok with all medication changes

## 2025-01-10 NOTE — PLAN OF CARE
Goal Outcome Evaluation:  Plan of Care Reviewed With: patient  Patient Agreement with Plan of Care: agrees     Progress: improving  Outcome Evaluation: Patient cooperative, has displayed restlessness, impulsivity on occasions today requiring redirection, tolerated. Patient denies suicidal or homicidal ideation

## 2025-01-10 NOTE — PLAN OF CARE
Problem: Adult Behavioral Health Plan of Care  Goal: Patient-Specific Goal (Individualization)  Outcome: Progressing  Flowsheets  Taken 1/7/2025 1025  Patient/Family-Specific Goals (Include Timeframe): Cruz to deny suicidal ideation prior to discharge. Cruz will attend group therapy over the next 72 hours to discuss information learned to assist with development of 1-2 healthy coping in order to prevent relapse. Patient to engage in MI working to identify motivation to change during his 4-7 day hospital stay. Patient plans to attend [facility] following stabilization with a  long-term goal of maintaining sobriety.  Individualized Care Needs: Medication management, individual and group therapy.  Anxieties, Fears or Concerns: none reported  Taken 1/7/2025 1011  Patient Personal Strengths:   expressive of emotions   expressive of needs   motivated for treatment  Patient Vulnerabilities:   family/relationship conflict   history of unsuccessful treatment   lacks insight into illness   substance abuse/addiction   poor impulse control   limited social skills  Goal: Optimized Coping Skills in Response to Life Stressors  Outcome: Progressing  Intervention: Promote Effective Coping Strategies  Flowsheets (Taken 1/10/2025 1231)  Supportive Measures:   active listening utilized   self-reflection promoted   counseling provided   positive reinforcement provided   self-responsibility promoted   decision-making supported   goal-setting facilitated   problem-solving facilitated   verbalization of feelings encouraged   self-care encouraged  Goal: Develops/Participates in Therapeutic Edwards to Support Successful Transition  Outcome: Progressing  Intervention: Foster Therapeutic Edwards  Flowsheets (Taken 1/10/2025 1231)  Trust Relationship/Rapport:   care explained   reassurance provided   choices provided   thoughts/feelings acknowledged   emotional support provided   empathic listening provided   questions encouraged   questions  answered  Intervention: Mutually Develop Transition Plan  Flowsheets (Taken 1/10/2025 1231)  Transition Support:   community resources reviewed   crisis management plan promoted   follow-up care discussed  Transportation Anticipated:   agency   health plan transportation  Transportation Concerns: none  Current Discharge Risk:   psychiatric illness   substance use/abuse   lack of support system/caregiver   cognitively impaired   homeless  Concerns to be Addressed:   coping/stress   cognitive/perceptual   relationship   compliance issue   mental health   medication   substance/tobacco abuse/use  Readmission Within the Last 30 Days: no previous admission in last 30 days  Patient/Family Anticipated Services at Transition: other (see comments)  Patient/Family Anticipates Transition to: other (see comments)  Offered/Gave Vendor List: no  Data:  Therapist discussed patient with Dr. Lozada, discussed patient with nursing staff and met with patient this date to further discuss patient progress, review healthy coping and safe disposition.  10:00 to 10:20 am  Therapist and patient contacted patients guardian and discussed plans for referral to [facility] and that this can be discussed again on Monday.    Clinical Maneuvering/Intervention:    Therapist assisted patient in processing session content; acknowledged and normalized patient's thoughts, feelings and concerns.  Encouraged patient to discuss/vent feelings, frustrations, and fears concerning their ongoing issues and validated patients feelings.  Discussed the importance of healthy coping and reviewed healthy coping skills such as distraction and social support.  Reviewed safe disposition with patient.    Assessment:  Patient became very upset while talking with his guardian when she explained that efforts would continue to secure [facility] placement.  Patient was yelling and cursing.  Patient eventually calmed down after making suicidal statements.  He also informed this therapist  that he does not plan to eat until Monday because he is upset.  He discussed that he is missing a cigarette and he wants to return home with his brother.  Discussed [facility] efforts with guardian and with patient.  Discussed that further efforts would be made on Monday.    Plan:  Patient will continue hospitalization/medication management. Efforts continue to secure [facility] placement for patient.

## 2025-01-11 PROCEDURE — 99232 SBSQ HOSP IP/OBS MODERATE 35: CPT | Performed by: PSYCHIATRY & NEUROLOGY

## 2025-01-11 RX ADMIN — OLANZAPINE 5 MG: 5 TABLET, FILM COATED ORAL at 21:27

## 2025-01-11 RX ADMIN — CLONIDINE HYDROCHLORIDE 0.1 MG: 0.1 TABLET ORAL at 08:08

## 2025-01-11 RX ADMIN — MIRTAZAPINE 15 MG: 15 TABLET, FILM COATED ORAL at 21:27

## 2025-01-11 RX ADMIN — BUPROPION HYDROCHLORIDE 300 MG: 150 TABLET, EXTENDED RELEASE ORAL at 08:07

## 2025-01-11 RX ADMIN — METHOCARBAMOL TABLETS 750 MG: 750 TABLET, COATED ORAL at 13:33

## 2025-01-11 NOTE — PLAN OF CARE
Goal Outcome Evaluation:  Plan of Care Reviewed With: patient  Patient Agreement with Plan of Care: agrees        Outcome Evaluation: Calm and copperative this shift. Spent most of free time in room. Denied SI, HI, AVH.

## 2025-01-11 NOTE — PROGRESS NOTES
"      Inpatient Psych Progress Note     Clinician: Vadim Lozada MD  Admission Date: 1/7/2025  09:50 EST 01/11/25    Behavioral Health Treatment Plan and Problem List: I have reviewed and approved the Behavioral Health Treatment Plan and Problem list.    Allergies  Allergies   Allergen Reactions    Amoxicillin Hives    Penicillins Hives    Venlafaxine Unknown - High Severity       Hospital Day: 4 days      Assessment completed within view of staff    History  CC/clinical focus: inappropriate demanding behavior     Interval HPI: Patient seen and evaluated by me.  Chart reviewed.  Patient overall cooperative, he is calm on exam this morning and reports to be doing well. Per nursing he displays restlessness and impulsivity on occasion but has been redirectable. He denies any SI/HI/AVH. He denies any side effects from his medications.    Med Compliant.  ROS otherwise as below.    Review of Systems   Constitutional: Negative.    HENT: Negative.     Eyes: Negative.    Respiratory: Negative.     Cardiovascular: Negative.    Gastrointestinal: Negative.    Endocrine: Negative.    Genitourinary: Negative.    Musculoskeletal: Negative.    Skin: Negative.    Allergic/Immunologic: Negative.    Neurological: Negative.    Hematological: Negative.         BP 98/65 (BP Location: Right arm, Patient Position: Sitting)   Pulse 107   Temp 97 °F (36.1 °C) (Temporal)   Resp 16   Ht 172.7 cm (68\")   Wt 64.3 kg (141 lb 12.8 oz)   SpO2 97%   BMI 21.56 kg/m²     Mental Status Exam  Mood:  \"good\"  Affect: mood-congruent   Thought Processes:  linear  Thought Content: normal  Hallucinations: no  Suicidal Thoughts: denies  Suicidal Plan/Intent: denies  Hopelesness:no  Homicidal Thoughts:  denies      Medical Decision Making:   Labs:     Lab Results (last 24 hours)       ** No results found for the last 24 hours. **              Radiology:     Imaging Results (Last 24 Hours)       ** No results found for the last 24 hours. **         "      EKG:     ECG/EMG Results (most recent)       None             Medications:  buPROPion XL, 300 mg, Oral, Daily  mirtazapine, 15 mg, Oral, Nightly  nicotine, 1 patch, Transdermal, Q24H  OLANZapine, 5 mg, Oral, Nightly           All medications reviewed.      Assessment and Plan:     Brief psychotic disorder  - Continue Olanzapine plus a supportive individual group psychotherapeutic effort     Other recurrent depressive disorders  - Mirtazapine plus the psychotherapeutic effort     Methamphetamine abuse  - Endorse his recovery effort     Borderline intellectual disability  - Incorporate into the treatment plan     ADHD (history of)  - Incorporate into the treatment plan     Hepatitis C  - Referral for outpatient clinic     Nicotine Use Disorder  - Replacement patch  - Encourage cessation       Continue hospitalization for safety and stabilization.  Continue current level of Special Precautions (q15 minute checks).        This note was generated by a scribe, Markel De Santiago. The work documented in this note was completed, reviewed, and approved by the attending psychiatrist as designated Dr. Vadim Lozada electronic signature.     I, Vadim Lozada MD, personally performed the services described in this documentation as scribed by the above named individual and is both accurate and complete as of 1/11/2025.

## 2025-01-11 NOTE — PLAN OF CARE
Goal Outcome Evaluation:  Plan of Care Reviewed With: patient  Plan of Care Reviewed With: patient  Patient Agreement with Plan of Care: agrees  Consent Given to Review Plan with: Patient reports anxiety 0 /depression 0. Pt denies any SI/HI/.  Patient pacing unit- talking to self. Patient required redirection several times this shift.       Outcome Evaluation: Patient reports anxiety 0 /depression 0. Pt denies any SI/HI/.  Patient pacing unit- talking to self. Patient required redirection several times this shift.

## 2025-01-12 PROCEDURE — 99232 SBSQ HOSP IP/OBS MODERATE 35: CPT | Performed by: PSYCHIATRY & NEUROLOGY

## 2025-01-12 RX ADMIN — METHOCARBAMOL TABLETS 750 MG: 750 TABLET, COATED ORAL at 21:24

## 2025-01-12 RX ADMIN — OLANZAPINE 5 MG: 5 TABLET, ORALLY DISINTEGRATING ORAL at 10:09

## 2025-01-12 RX ADMIN — OLANZAPINE 5 MG: 5 TABLET, FILM COATED ORAL at 21:24

## 2025-01-12 RX ADMIN — BUPROPION HYDROCHLORIDE 300 MG: 150 TABLET, EXTENDED RELEASE ORAL at 08:08

## 2025-01-12 RX ADMIN — MIRTAZAPINE 15 MG: 15 TABLET, FILM COATED ORAL at 21:24

## 2025-01-12 NOTE — PLAN OF CARE
Goal Outcome Evaluation:  Plan of Care Reviewed With: patient  Patient Agreement with Plan of Care: agrees  Consent Given to Review Plan with: Pt stated he was eating and sleeping well. Pt rated anxiety and depression a 0/10. Pt stated he had no si/hi thoughts or and avh. Pt was calm and cooperative this shift and had no issues or concerns.

## 2025-01-12 NOTE — PROGRESS NOTES
"      Inpatient Psych Progress Note     Clinician: Vadim Lozada MD  Admission Date: 1/7/2025  08:02 EST 01/12/25    Behavioral Health Treatment Plan and Problem List: I have reviewed and approved the Behavioral Health Treatment Plan and Problem list.    Allergies  Allergies   Allergen Reactions    Amoxicillin Hives    Penicillins Hives    Venlafaxine Unknown - High Severity       Hospital Day: 5 days      Assessment completed within view of staff    History  CC/clinical focus: inappropriate demanding behavior     Interval HPI: Patient seen and evaluated by me.  Chart reviewed.  Patient with no new concerns this morning. He continues to be calm and cooperative, has required redirection from staff at times but no major setbacks. He is denying any SI/HI/AVH at this time. Tolerating medications well with no reported side effect. Denies any unmet needs at this time.   Med Compliant.  ROS otherwise as below.    Review of Systems   Constitutional: Negative.    HENT: Negative.     Eyes: Negative.    Respiratory: Negative.     Cardiovascular: Negative.    Gastrointestinal: Negative.    Endocrine: Negative.    Genitourinary: Negative.    Musculoskeletal: Negative.    Skin: Negative.    Allergic/Immunologic: Negative.    Neurological: Negative.    Hematological: Negative.    Psychiatric/Behavioral:  The patient is hyperactive.         /76 (BP Location: Right arm, Patient Position: Sitting)   Pulse 115   Temp 97 °F (36.1 °C) (Temporal)   Resp 18   Ht 172.7 cm (68\")   Wt 64.3 kg (141 lb 12.8 oz)   SpO2 97%   BMI 21.56 kg/m²     Mental Status Exam  Mood:  \"good\"  Affect: mood-congruent   Thought Processes:  linear  Thought Content: normal  Hallucinations: no  Suicidal Thoughts: denies  Suicidal Plan/Intent: denies  Hopelesness:no  Homicidal Thoughts:  denies      Medical Decision Making:   Labs:     Lab Results (last 24 hours)       ** No results found for the last 24 hours. **              Radiology:     Imaging " Results (Last 24 Hours)       ** No results found for the last 24 hours. **              EKG:     ECG/EMG Results (most recent)       None             Medications:  buPROPion XL, 300 mg, Oral, Daily  mirtazapine, 15 mg, Oral, Nightly  nicotine, 1 patch, Transdermal, Q24H  OLANZapine, 5 mg, Oral, Nightly           All medications reviewed.      Assessment and Plan:     Brief psychotic disorder  - Continue Olanzapine plus a supportive individual group psychotherapeutic effort     Other recurrent depressive disorders  - Mirtazapine plus the psychotherapeutic effort     Methamphetamine abuse  - Endorse his recovery effort     Borderline intellectual disability  - Incorporate into the treatment plan     ADHD (history of)  - Incorporate into the treatment plan     Hepatitis C  - Referral for outpatient clinic     Nicotine Use Disorder  - Replacement patch  - Encourage cessation              Continue hospitalization for safety and stabilization.  Continue current level of Special Precautions (q15 minute checks).        This note was generated by a scribe, Markel De Santiago. The work documented in this note was completed, reviewed, and approved by the attending psychiatrist as designated Dr. Vadim Lozada electronic signature.     I, Vadim Lozada MD, personally performed the services described in this documentation as scribed by the above named individual and is both accurate and complete as of 1/12/2025.

## 2025-01-12 NOTE — PLAN OF CARE
Problem: Adult Behavioral Health Plan of Care  Goal: Plan of Care Review  Outcome: Progressing  Flowsheets  Taken 1/12/2025 1430  Consent Given to Review Plan with: client has been calm and cooperative. reports good sleep, dneis anxiety and depression, denies hallucinations and appetite is good.  Patient Agreement with Plan of Care: agrees  Plan of Care Reviewed With: patient  Taken 1/12/2025 0760  Patient Agreement with Plan of Care: agrees   Goal Outcome Evaluation:  Plan of Care Reviewed With: patient  Plan of Care Reviewed With: patient  Patient Agreement with Plan of Care: agrees  Consent Given to Review Plan with: client has been calm and cooperative. reports good sleep, dneis anxiety and depression, denies hallucinations and appetite is good.

## 2025-01-13 PROCEDURE — 99232 SBSQ HOSP IP/OBS MODERATE 35: CPT | Performed by: PSYCHIATRY & NEUROLOGY

## 2025-01-13 RX ADMIN — MIRTAZAPINE 15 MG: 15 TABLET, FILM COATED ORAL at 20:16

## 2025-01-13 RX ADMIN — OLANZAPINE 5 MG: 5 TABLET, FILM COATED ORAL at 20:16

## 2025-01-13 RX ADMIN — BUPROPION HYDROCHLORIDE 300 MG: 150 TABLET, EXTENDED RELEASE ORAL at 08:13

## 2025-01-13 NOTE — PLAN OF CARE
Goal Outcome Evaluation:  Plan of Care Reviewed With: patient  Plan of Care Reviewed With: patient  Patient Agreement with Plan of Care: agrees     Progress: improving  Outcome Evaluation: Patient denies any Anx, Dep, SI/HI/AVH, Reports a good eating and sleeping routine He has gotten up couple times thus far but went right back to sleep Patient calm and cooperative States adequate kidney and bowel function. He does not voice any concerns will continue to monitor

## 2025-01-13 NOTE — PLAN OF CARE
Goal Outcome Evaluation:  Plan of Care Reviewed With: patient  Plan of Care Reviewed With: patient  Patient Agreement with Plan of Care: agrees     Progress: improving  Outcome Evaluation: Pt cooperative with staff. Pt denies problems with sleep or appetite. Pt denies any anxiety or depression, rating both 0/10. Pt also denies SI, HI, and AVH.

## 2025-01-13 NOTE — PROGRESS NOTES
"INPATIENT PSYCHIATRIC PROGRESS NOTE    Name:  Cruz Mims  :  1985  MRN:  [id number]  Visit Number:  [id number]  Length of stay:  6    Behavioral Health Treatment Plan and Problem List: I have reviewed and approved the Behavioral Health Treatment Plan and Problem list.    SUBJECTIVE    CC/ Focus of exam:  Inappropriate demanding, impulsive behavior.     Patient's subjective status: \"doing fine\"    INTERVAL HISTORY: The patient affect is appropriate, he has been calm and cooperative awaiting a mutually agreeable disposition by the patient and his guardian.  Session with guardian planned for later today.  Patient free of any active psychotic thought content, has agreed to returning to a residential chemical dependency treatment program verbally.    Depression rating 0/10  Anxiety rating 0/10  Hopelessness: 0/10  Sleep: 7 hours  Withdrawal sx: None  Craving: Says not       ROS: No cardiovascular, GI, or Neurological complaints.       OBJECTIVE    Vitals:    25 0827   BP: 119/76   Pulse: 113   Resp: 18   Temp: 97.4 °F (36.3 °C)   SpO2: 97%      Temp:  [97.2 °F (36.2 °C)-97.4 °F (36.3 °C)] 97.4 °F (36.3 °C)  Heart Rate:  [] 113  Resp:  [16-18] 18  BP: (119-123)/(76-80) 119/76    MENTAL STATUS EXAM:       Psychomotor: No psychomotor agitation/retardation, No EPS, No motor tics  Speech-normal rate, amount.  Mood/Affect: Appropriate  Thought Processes: coherent  Thought Content: normal  Hallucination(s): none  Hopelessness: No  Optimistic: yes  Suicidal Thoughts: No  Suicidal Plan/Intent: No  Homicidal Thoughts: absent  Orientation: oriented x 3  Memory: recent intact    Lab Results (last 24 hours)       ** No results found for the last 24 hours. **             Imaging Results (Last 24 Hours)       ** No results found for the last 24 hours. **             Lab and x-ray studies reviewed.    ECG/EMG Results (most recent)       None             ALLERGIES: Amoxicillin, Penicillins, and " Venlafaxine    Medications:     buPROPion XL, 300 mg, Oral, Daily  mirtazapine, 15 mg, Oral, Nightly  nicotine, 1 patch, Transdermal, Q24H  OLANZapine, 5 mg, Oral, Nightly       ASSESSMENT & PLAN      Brief psychotic disorder  Olanzapine plus a supportive individual group psychotherapeutic effort       Other recurrent depressive disorders  Mirtazapine plus the psychotherapeutic effort       Methamphetamine abuse  Endorse his recovery effort       Borderline intellectual disability  Incorporate into the treatment plan       ADHD (history of)  Incorporate into the treatment plan       Hepatitis C  Referral for outpatient clinic      Special precautions: Special Precautions Level 3 (q15 min checks)     Behavioral Health Treatment Plan and Problem List: I have reviewed and approved the Behavioral Health Treatment Plan and Problem list.    I spent a total of 26 minutes in direct patient care including  17 minutes face to face with the patient assessment, coordination of care, and counseling the patient on the current and follow-up treatment plans regarding his status and situation.  Patient had no additional questions.     SHERLYN Lozada MD    Clinician:  Carlton Lozada MD  01/13/25  10:31 EST    Dictated utilizing Dragon dictation

## 2025-01-13 NOTE — PROGRESS NOTES
"Navigator is helping with the following referrals:     [facility] - [phone]  -Sent 1/13  -Denied. Monica states patient needs a higher level of care than they can provide.  1/13    [facility] - [phone]  -Sent 1/13    [facility] - [phone]  -Sent 1/13    [facility] Boyle - [phone]  -Sent 1/9  - out sick.  1/10  -Declined.  [facility] states \"they have had patient before and all he was was trouble.\"  1/13     [facility] - [phone]  -Sent 1/9  -Admin not in the building today.  1/10  -Kelia not available at this time.  1/13     [facility] - [phone]  -Sent 1/8  -Staff states  will call tomorrow to set up a Zoom interview with patient.  1/9  -Spoke with Admin, Judy. She states to resend referral.  1/10  -Judy states she is reviewing referral today.  1/13     [facility] - [phone]  -Sent 1/8  -Referral still in review.  1/9  -Still in review. 1/10  -Still in review. 1/13     [facility] [phone]  -Sent 1/7  -Left message for Cami. 1/8   -Spoke with Cami. She did not receive referral but doesn't think they can accept patient due to him being there in the past.  1/9     [facility] - [phone]  -Sent 1/7  -No answer. 1/8  -No answer  1/9  -Admissions not in the building today.  1/10   -Referral was received. If approved there is currently a waiting list for admission.  1/13     [facility] - [phone]  -Sent 1/7  -No answer.  1/8  -Admissions in a meeting.  1/9  -Declined.  1/10     [facility] - [phone]  -Sent 1/7  -Admissions out of the office until Monday. 1/8  -Peggy states that she has been out sick but once she reviews referral she will let us know.  1/13  "

## 2025-01-13 NOTE — DISCHARGE PLACEMENT REQUEST
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  - Encourage cessation       Continue hospitalization for safety and stabilization.  Continue current level of Special Precautions (q15 minute checks).        This note was generated by a scribeMarkel. The work documented in this note was completed, reviewed, and approved by the attending psychiatrist as designated Dr. Vadim Lozada electronic signature.     I, Vadim Lozada MD, personally performed the services described in this documentation as scribed by the above named individual and is both accurate and complete as of 1/11/2025.      Electronically signed by Vadim Lozada MD at 01/11/25 6098

## 2025-01-13 NOTE — PLAN OF CARE
Problem: Adult Behavioral Health Plan of Care  Goal: Patient-Specific Goal (Individualization)  Outcome: Progressing  Flowsheets  Taken 1/7/2025 1025  Patient/Family-Specific Goals (Include Timeframe): Cruz to deny suicidal ideation prior to discharge. Cruz will attend group therapy over the next 72 hours to discuss information learned to assist with development of 1-2 healthy coping in order to prevent relapse. Patient to engage in MI working to identify motivation to change during his 4-7 day hospital stay. Patient plans to attend [facility] following stabilization with a  long-term goal of maintaining sobriety.  Individualized Care Needs: Medication management, individual and group therapy.  Anxieties, Fears or Concerns: none reported  Taken 1/7/2025 1011  Patient Personal Strengths:   expressive of emotions   expressive of needs   motivated for treatment  Patient Vulnerabilities:   family/relationship conflict   history of unsuccessful treatment   lacks insight into illness   substance abuse/addiction   poor impulse control   limited social skills  Goal: Optimized Coping Skills in Response to Life Stressors  Outcome: Progressing  Intervention: Promote Effective Coping Strategies  Flowsheets (Taken 1/13/2025 1348)  Supportive Measures:   active listening utilized   self-reflection promoted   counseling provided   mindfulness techniques promoted   self-responsibility promoted   positive reinforcement provided   decision-making supported   goal-setting facilitated   problem-solving facilitated   verbalization of feelings encouraged   relaxation techniques promoted   journaling promoted   self-care encouraged  Goal: Develops/Participates in Therapeutic Neptune to Support Successful Transition  Outcome: Progressing  Intervention: Foster Therapeutic Neptune  Flowsheets (Taken 1/13/2025 1348)  Trust Relationship/Rapport:   care explained   reassurance provided   choices provided   thoughts/feelings acknowledged    emotional support provided   empathic listening provided   questions answered   questions encouraged  Intervention: Mutually Develop Transition Plan  Flowsheets  Taken 1/13/2025 1348  Transition Support:   community resources reviewed   crisis management plan promoted   follow-up care discussed  Taken 1/13/2025 1346  Discharge Coordination/Progress: Patient has Medicare AB/Passport insurance, patient is requesting referral to [facility] today along with [facility] referrals-guardian consents  Transportation Anticipated:   agency   health plan transportation  Transportation Concerns: none  Current Discharge Risk:   psychiatric illness   substance use/abuse   lack of support system/caregiver   cognitively impaired   homeless  Concerns to be Addressed:   coping/stress   cognitive/perceptual   relationship   compliance issue   mental health   medication   substance/tobacco abuse/use  Readmission Within the Last 30 Days: no previous admission in last 30 days  Patient/Family Anticipated Services at Transition: other (see comments)  Patient's Choice of Community Agency(s): Guardian consents to [facility] referrals and consents for referral to [facility]  Patient/Family Anticipates Transition to: other (see comments)  Offered/Gave Vendor List: no  Data:  Therapist discussed patient with Dr. Lozada, discussed patient with nursing staff and met with patient at 11:30 am to 12:00 pm this date to further discuss patient progress, review healthy coping and safe disposition.  Therapist facilitated phone call with patients guardian.      Clinical Maneuvering/Intervention:    Therapist assisted patient in processing session content; acknowledged and normalized patient's thoughts, feelings and concerns.  Encouraged patient to discuss/vent feelings, frustrations, and fears concerning their ongoing issues and validated patients feelings.  Discussed the importance of healthy coping and reviewed healthy coping skills such as distraction, deep breathing, social  "support, behavioral activation, mindfulness, and gratitude.  Reviewed safe disposition with patient.    Assessment:  Patient denies suicidal ideation/homicidal ideation.  Patient reports decrease in depression and anxiety today.  Patient states he would like at attend [facility]  and patients guardian was agreeable to referral.  She encouraged patient to consider that his brother's home is not the best option and that a [facility] would be more suitable.  Patient continues to report that a [facility] will not take him because he has ran off from so many of them.  Patients guardian reminded patient that he also ran off from [facility] and this might be an issue there.  Patient assured his guardian that [facility] staff told him that he could return if he needed help in the future.  Patient reported that [facility] would be quicker while he awaits a [facility] placement and \"at least I can smoke.\"         Plan:  Patient will continue hospitalization/medication management. Efforts continue to secure safe disposition for patient.                               "

## 2025-01-14 PROCEDURE — 99232 SBSQ HOSP IP/OBS MODERATE 35: CPT | Performed by: PSYCHIATRY & NEUROLOGY

## 2025-01-14 RX ADMIN — OLANZAPINE 5 MG: 5 TABLET, FILM COATED ORAL at 21:19

## 2025-01-14 RX ADMIN — BUPROPION HYDROCHLORIDE 300 MG: 150 TABLET, EXTENDED RELEASE ORAL at 08:33

## 2025-01-14 RX ADMIN — MIRTAZAPINE 15 MG: 15 TABLET, FILM COATED ORAL at 21:19

## 2025-01-14 NOTE — PROGRESS NOTES
"Navigator is helping with the following referrals:       [facility] - [phone]  -Sent 1/14  -Patient approved. Bed available in [place] tomorrow.  time pending.  1/14  - 2-3:00pm.  1/14    [facility] - [phone]  -Sent 1/13  -Denied. Monica states patient needs a higher level of care than they can provide.  1/13     [facility] - [phone]  -Sent 1/13  -No answer. No voicemail.  1/14     [facility] - [phone]  -Sent 1/13  -No beds. 1/14     [facility] - [phone]  -Sent 1/9  - out sick.  1/10  -Declined.  [facility] states \"they have had patient before and all he was was trouble.\"  1/13     [facility] - [phone]  -Sent 1/9  -Admin not in the building today.  1/10  -Keila not available at this time.  1/13  -No answer.  1/14     [facility] - [phone]  -Sent 1/8  -Staff states  will call tomorrow to set up a Zoom interview with patient.  1/9  -Spoke with Admin, Judy. She states to resend referral.  1/10  -Judy states she is reviewing referral today.  1/13  -Left message.  1/14     [facility] - [phone]  -Sent 1/8  -Referral still in review.  1/9  -Still in review. 1/10  -Still in review. 1/13  -Declined. 1/14     [facility] [phone]  -Sent 1/7  -Left message for Cami. 1/8   -Spoke with Cami. She did not receive referral but doesn't think they can accept patient due to him being there in the past.  1/9     [facility] - [phone]  -Sent 1/7  -No answer. 1/8  -No answer  1/9  -Admissions not in the building today.  1/10   -Referral was received. If approved there is currently a waiting list for admission.  1/13     [facility] - [phone]  -Sent 1/7  -No answer.  1/8  -Admissions in a meeting.  1/9  -Declined.  1/10     [facility] - [phone]  -Sent 1/7  -Admissions out of the office until Monday. 1/8  -Peggy states that she has been out sick but once she reviews referral she " will let us know.  1/13

## 2025-01-14 NOTE — PLAN OF CARE
Goal Outcome Evaluation:  Plan of Care Reviewed With: patient  Plan of Care Reviewed With: patient  Patient Agreement with Plan of Care: agrees        Progress: improving    Patient rates both anxiety and depression 0/10. Patient denies any SI/HI/AVH. Patient reports good appetite and sleep. Patient had no complaints this shift.
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
## 2025-01-14 NOTE — PROGRESS NOTES
"INPATIENT PSYCHIATRIC PROGRESS NOTE    Name:  Cruz Mims  :  1985  MRN:  [id number]  Visit Number:  [id number]  Length of stay:  7    Behavioral Health Treatment Plan and Problem List: I have reviewed and approved the Behavioral Health Treatment Plan and Problem list.    SUBJECTIVE    CC/ Focus of exam:  Impulse control disorder/ substance abuse    Patient's subjective status: \"I'm doing ok, my brother will take me back if I don't do drugs\"    INTERVAL HISTORY: The patient agreeable with a plan for him to return to his brother's coupled with an intensive outpatient day program given the difficulty finding alternatives. Therapist working with the patient's state guardian. Still seeking [facility] placement.     Depression rating 0/10  Anxiety rating 0/10  Hopelessness: 0/10  Sleep:slept well  Craving: says not       ROS: No cardiovascular, GI, or Neurological complaints.       OBJECTIVE    Vitals:    25 0740   BP: 138/72   Pulse: 83   Resp: 17   Temp: 97.7 °F (36.5 °C)   SpO2: 97%      Temp:  [97.4 °F (36.3 °C)-97.7 °F (36.5 °C)] 97.7 °F (36.5 °C)  Heart Rate:  [83-99] 83  Resp:  [16-17] 17  BP: (123-138)/(72-78) 138/72    MENTAL STATUS EXAM:       Psychomotor: No psychomotor agitation/retardation, No EPS, No motor tics  Speech-normal rate, amount.  Mood/Affect: Appropriate  Thought Processes: coherent  Thought Content: normal  Hallucination(s): none  Hopelessness: No  Optimistic: minimally  Suicidal Thoughts: No  Suicidal Plan/Intent: No  Homicidal Thoughts: absent  Orientation: oriented x 3  Memory: recent intact    Lab Results (last 24 hours)       ** No results found for the last 24 hours. **             Imaging Results (Last 24 Hours)       ** No results found for the last 24 hours. **             Lab and x-ray studies reviewed.    ECG/EMG Results (most recent)       None             ALLERGIES: Amoxicillin, Penicillins, and Venlafaxine    Medications:     buPROPion XL, 300 mg, Oral, " Daily  mirtazapine, 15 mg, Oral, Nightly  nicotine, 1 patch, Transdermal, Q24H  OLANZapine, 5 mg, Oral, Nightly       ASSESSMENT & PLAN    Brief psychotic disorder  Olanzapine plus a supportive individual group psychotherapeutic effort       Other recurrent depressive disorders  Mirtazapine plus the psychotherapeutic effort       Methamphetamine abuse  Endorse his recovery effort       Borderline intellectual disability  Incorporate into the treatment plan       ADHD (history of)  Incorporate into the treatment plan       Hepatitis C  Referral for outpatient clinic      Special precautions: Special Precautions Level 3 (q15 min checks)     Behavioral Health Treatment Plan and Problem List: I have reviewed and approved the Behavioral Health Treatment Plan and Problem list.    I spent a total of 26 minutes in direct patient care including  17 minutes face to face with the patient assessment, coordination of care, and counseling the patient on the current and follow-up treatment plans regarding his status and situation.  Patient had no additional questions.     SHERLYN Lozada MD    Clinician:  Carlton Lozada MD  01/14/25  09:05 EST    Dictated utilizing Dragon dictation

## 2025-01-14 NOTE — PLAN OF CARE
Goal Outcome Evaluation:  Plan of Care Reviewed With: patient  Plan of Care Reviewed With: patient  Patient Agreement with Plan of Care: agrees     Progress: improving  Outcome Evaluation: Pt calm and cooperative upon assessment Denies any delusional material present. Denies any issues with Anx, Dep, SI/HI/AVH adequate bowel and kidney function Appropiate eating and sleeping routine Pt awaiting processing of referral to get into program

## 2025-01-14 NOTE — PLAN OF CARE
Problem: Adult Behavioral Health Plan of Care  Goal: Patient-Specific Goal (Individualization)  Outcome: Progressing  Flowsheets  Taken 1/7/2025 1025  Patient/Family-Specific Goals (Include Timeframe): Cruz to deny suicidal ideation prior to discharge. Cruz will attend group therapy over the next 72 hours to discuss information learned to assist with development of 1-2 healthy coping in order to prevent relapse. Patient to engage in MI working to identify motivation to change during his 4-7 day hospital stay. Patient plans to attend [facility] following stabilization with a  long-term goal of maintaining sobriety.  Individualized Care Needs: Medication management, individual and group therapy.  Anxieties, Fears or Concerns: none reported  Taken 1/7/2025 1011  Patient Personal Strengths:   expressive of emotions   expressive of needs   motivated for treatment  Patient Vulnerabilities:   family/relationship conflict   history of unsuccessful treatment   lacks insight into illness   substance abuse/addiction   poor impulse control   limited social skills  Goal: Optimized Coping Skills in Response to Life Stressors  Outcome: Progressing  Intervention: Promote Effective Coping Strategies  Flowsheets (Taken 1/14/2025 1636)  Supportive Measures:   active listening utilized   self-reflection promoted   counseling provided   positive reinforcement provided   self-responsibility promoted   decision-making supported   goal-setting facilitated   problem-solving facilitated   verbalization of feelings encouraged   self-care encouraged  Goal: Develops/Participates in Therapeutic [place] to Support Successful Transition  Outcome: Progressing  Intervention: Foster Therapeutic [place]  Flowsheets (Taken 1/14/2025 1636)  Trust Relationship/Rapport:   care explained   reassurance provided   thoughts/feelings acknowledged   choices provided   emotional support provided   empathic listening provided   questions answered   questions  encouraged  Intervention: Mutually Develop Transition Plan  Flowsheets  Taken 1/14/2025 1636  Transition Support:   community resources reviewed   crisis management plan promoted   crisis management plan verbalized   follow-up care coordinated   follow-up care discussed  Taken 1/14/2025 1635  Transportation Anticipated:   agency   health plan transportation  Transportation Concerns: none  Current Discharge Risk:   psychiatric illness   substance use/abuse   lack of support system/caregiver   cognitively impaired   homeless  Concerns to be Addressed:   coping/stress   cognitive/perceptual   relationship   compliance issue   mental health   medication   substance/tobacco abuse/use  Readmission Within the Last 30 Days: no previous admission in last 30 days  Patient/Family Anticipated Services at Transition: rehabilitation services  Patient's Choice of Community Agency(s): Guardian consented to referral to [facility] today  Patient/Family Anticipates Transition to: inpatient rehabilitation facility  Offered/Gave Vendor List: no  Data:  Therapist discussed patient with Dr. Lozada this morning at 09:02 am, discussed patient with nursing staff and met with patient this date 13:00 to further discuss patient progress, review healthy coping and safe disposition.  Therapist spoke with guardian who confirmed consent for referral to [facility] and informed guardian that patient was accepted by [facility].      Clinical Maneuvering/Intervention:    Therapist assisted patient in processing session content; acknowledged and normalized patient's thoughts, feelings and concerns.  Encouraged patient to discuss/vent feelings, frustrations, and fears concerning their ongoing issues and validated patients feelings.  Discussed the importance of healthy coping and reviewed healthy coping skills such as distraction and social support.  Reviewed safe disposition with patient.    Assessment:  Patient denies suicidal  "ideation/homicidal ideation.  Patient reports decrease in depression and anxiety today.  Patient states states that he called [facility] and that his guardian will be okay with him going there.  He reported that he is tired of being in the hospital and \"no personal care home is going to take me.\"  Therapist confirmed with guardian that patient can attend [facility] and informed her that he was accepted, provided her with contact number and she reported that she would reach out to them.       Plan:  Patient will continue hospitalization/medication management. Patient will attend [facility] on 1/15/2025.                               "

## 2025-01-15 VITALS
SYSTOLIC BLOOD PRESSURE: 143 MMHG | BODY MASS INDEX: 21.49 KG/M2 | DIASTOLIC BLOOD PRESSURE: 68 MMHG | HEIGHT: 68 IN | RESPIRATION RATE: 18 BRPM | OXYGEN SATURATION: 98 % | WEIGHT: 141.8 LBS | HEART RATE: 112 BPM | TEMPERATURE: 97.5 F

## 2025-01-15 PROCEDURE — 99239 HOSP IP/OBS DSCHRG MGMT >30: CPT | Performed by: PSYCHIATRY & NEUROLOGY

## 2025-01-15 RX ORDER — OLANZAPINE 5 MG/1
5 TABLET ORAL NIGHTLY
Qty: 30 TABLET | Refills: 0 | Status: SHIPPED | OUTPATIENT
Start: 2025-01-15

## 2025-01-15 RX ORDER — BUPROPION HYDROCHLORIDE 300 MG/1
300 TABLET ORAL DAILY
Qty: 30 TABLET | Refills: 0 | Status: SHIPPED | OUTPATIENT
Start: 2025-01-16 | End: 2025-01-15

## 2025-01-15 RX ORDER — BUPROPION HYDROCHLORIDE 300 MG/1
300 TABLET ORAL DAILY
Qty: 30 TABLET | Refills: 0 | Status: SHIPPED | OUTPATIENT
Start: 2025-01-16

## 2025-01-15 RX ADMIN — BUPROPION HYDROCHLORIDE 300 MG: 150 TABLET, EXTENDED RELEASE ORAL at 08:01

## 2025-01-15 NOTE — PLAN OF CARE
Problem: Adult Behavioral Health Plan of Care  Goal: Develops/Participates in Therapeutic [place] to Support Successful Transition  Intervention: Mutually Develop Transition Plan  Recent Flowsheet Documentation  Taken 1/15/2025 1602 by Mariella Quintero LCSW  Transportation Anticipated: agency  Transportation Concerns: none  Current Discharge Risk:   psychiatric illness   substance use/abuse   lack of support system/caregiver   cognitively impaired   homeless  Concerns to be Addressed:   coping/stress   cognitive/perceptual   relationship   compliance issue   mental health   medication   substance/tobacco abuse/use  Readmission Within the Last 30 Days: no previous admission in last 30 days  Patient/Family Anticipated Services at Transition: rehabilitation services  Patient/Family Anticipates Transition to: inpatient rehabilitation facility  Offered/Gave Vendor List: no    Patient is denying suicidal ideation today and denying homicidal ideation today.  Patient reports decrease in Depression today and decrease in Anxiety today.  Patient successful in identifying healthy coping and protective factors this date.  Patient is future oriented and ready for discharge.  Patient will attend [facility] for residential [facility] treatment with [facility] staff transporting this date.

## 2025-01-15 NOTE — DISCHARGE SUMMARY
Date of Discharge:  1/15/2025    Discharge Diagnosis:Principal Problem:    Suicidal ideation  Active Problems:    Brief psychotic disorder    Methamphetamine abuse    Other recurrent depressive disorders    Borderline intellectual disability    ADHD (attention deficit hyperactivity disorder)    Hepatitis C        Presenting Problem/History of Present Illness:Patient was admitted to the hospital on January 7 having presented voicing suicidal intent, voluntarily admitted for safety evaluation and treatment, see admission note for details.         Hospital Course:    Patient was admitted for safety and stabilization and was placed on standard precautions.  Routine labs were checked.  Patient was assigned a master's level therapist and provided with an opportunity to participate in group and individual therapy on the unit.  Patient seen on a daily basis for evaluation and supportive therapy.  At discharge patient medications to include the olanzapine and bupropion.  Patient's admitting dysphoria and reported psychotic symptomatology dissipated rapidly as his problems seem to primarily the methamphetamine abuse along with impulsivity and borderline intellect.  Patient is under state guardianship and his disposition plan originated primarily with he and his guardianship decision that another residential rehabilitation program was the best option. By the conclusion of this hospitalization, patient is exhibiting no acutely concerning symptoms of mood, psychotic or thought disorder that would necessitate further inpatient care. Patient is also denying SI, HI, and AVH. Patient has shown improvement of presenting symptoms, exhibited no behavior concerning for harm to self or others, and is considered appropriate for discharge to a lower level of care today. Treatment and safe discharge planning completed. See below for prescriptions and follow-up appointments.    Consults:   Consults       No orders found from 12/9/2024 to  1/8/2025.            Labs:  Lab Results (all)       Procedure Component Value Units Date/Time    Hepatitis Panel, Acute [[id number]]  (Abnormal) Collected: 01/07/25 0740    Specimen: Blood Updated: 01/07/25 0838     Hepatitis B Surface Ag Non-Reactive     Hep A IgM Non-Reactive     Hep B C IgM Non-Reactive     Hepatitis C Ab Reactive    Narrative:      Results may be falsely decreased if patient taking Biotin.             Imaging:  Imaging Results (All)       None            Condition on Discharge:  improved    Prognosis: Concerning    Vital Signs  Temp:  [97.5 °F (36.4 °C)-98.5 °F (36.9 °C)] 97.5 °F (36.4 °C)  Heart Rate:  [] 112  Resp:  [16-18] 18  BP: (131-143)/(68-82) 143/68    Discharge Disposition  Rehab Facility or Unit (DC - External)       Discharge Medications        New Medications        Instructions Start Date   OLANZapine 5 MG tablet  Commonly known as: zyPREXA   5 mg, Oral, Nightly             Continue These Medications        Instructions Start Date   buPROPion  MG 24 hr tablet  Commonly known as: WELLBUTRIN XL   300 mg, Oral, Daily   Start Date: January 16, 2025            Stop These Medications      acetaminophen 500 MG tablet  Commonly known as: TYLENOL     busPIRone 10 MG tablet  Commonly known as: BUSPAR     cloNIDine 0.1 MG tablet  Commonly known as: CATAPRES     fluticasone 50 MCG/ACT nasal spray  Commonly known as: FLONASE     ibuprofen 800 MG tablet  Commonly known as: ADVIL,MOTRIN     melatonin 5 MG tablet tablet     methocarbamol 750 MG tablet  Commonly known as: ROBAXIN     mirtazapine 15 MG tablet  Commonly known as: REMERON     naloxone 4 MG/0.1ML nasal spray  Commonly known as: NARCAN     ondansetron ODT 4 MG disintegrating tablet  Commonly known as: ZOFRAN-ODT     traZODone 50 MG tablet  Commonly known as: DESYREL              Discharge Diet: Regular    Activity at Discharge: No restrictions    Follow-up Appointments:    [facility]  [facility] [employer] [facility]  [id number]  [phone]     1/15/2025        Carlton Lozada MD  01/15/25  10:26 EST  .    Time: I spent greater than 30 minutes on this discharge activity which included: face-to-face encounter with the patient, reviewing the data in the system, coordination of the care with the nursing staff as well as consultants, documentation, and entering orders.      Dictated utilizing Dragon dictation

## 2025-01-15 NOTE — PLAN OF CARE
Goal Outcome Evaluation:  Plan of Care Reviewed With: patient  Plan of Care Reviewed With: patient      Patient is discharging today.
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
